# Patient Record
Sex: FEMALE | Race: WHITE | Employment: OTHER | URBAN - METROPOLITAN AREA
[De-identification: names, ages, dates, MRNs, and addresses within clinical notes are randomized per-mention and may not be internally consistent; named-entity substitution may affect disease eponyms.]

---

## 2017-06-27 RX ORDER — PAROXETINE 10 MG/1
TABLET, FILM COATED ORAL
Qty: 90 TAB | Refills: 3 | Status: SHIPPED | OUTPATIENT
Start: 2017-06-27 | End: 2017-08-28 | Stop reason: SDUPTHER

## 2017-07-17 RX ORDER — METOPROLOL SUCCINATE 25 MG/1
TABLET, EXTENDED RELEASE ORAL
Qty: 15 TAB | Refills: 0 | OUTPATIENT
Start: 2017-07-17

## 2017-07-17 NOTE — TELEPHONE ENCOUNTER
Patient said she is completely out of her medication and she needs this sent in ASAP she said she has been calling

## 2017-07-19 RX ORDER — METOPROLOL SUCCINATE 25 MG/1
TABLET, EXTENDED RELEASE ORAL
Qty: 15 TAB | Refills: 0 | Status: SHIPPED | OUTPATIENT
Start: 2017-07-19 | End: 2017-08-08 | Stop reason: SDUPTHER

## 2017-07-19 NOTE — TELEPHONE ENCOUNTER
Requested Prescriptions     Signed Prescriptions Disp Refills    metoprolol succinate (TOPROL-XL) 25 mg XL tablet 15 Tab 0     Sig: TAKE ONE-HALF TABLET BY MOUTH DAILY     Authorizing Provider: Yamini Trejo     Ordering User: Shannan Hooper     Per Orders

## 2017-08-08 ENCOUNTER — OFFICE VISIT (OUTPATIENT)
Dept: CARDIOLOGY CLINIC | Age: 72
End: 2017-08-08

## 2017-08-08 VITALS
BODY MASS INDEX: 30.94 KG/M2 | WEIGHT: 147.4 LBS | SYSTOLIC BLOOD PRESSURE: 134 MMHG | HEIGHT: 58 IN | HEART RATE: 64 BPM | DIASTOLIC BLOOD PRESSURE: 80 MMHG | RESPIRATION RATE: 16 BRPM

## 2017-08-08 DIAGNOSIS — E55.9 VITAMIN D DEFICIENCY: ICD-10-CM

## 2017-08-08 DIAGNOSIS — I10 ESSENTIAL HYPERTENSION: Primary | ICD-10-CM

## 2017-08-08 DIAGNOSIS — I49.8 VENTRICULAR TRIGEMINY: ICD-10-CM

## 2017-08-08 DIAGNOSIS — F41.9 ANXIETY: ICD-10-CM

## 2017-08-08 DIAGNOSIS — E11.9 DIABETES MELLITUS TYPE 2, DIET-CONTROLLED (HCC): ICD-10-CM

## 2017-08-08 DIAGNOSIS — I49.3 PVC (PREMATURE VENTRICULAR CONTRACTION): ICD-10-CM

## 2017-08-08 RX ORDER — METOPROLOL SUCCINATE 25 MG/1
25 TABLET, EXTENDED RELEASE ORAL DAILY
Qty: 90 TAB | Refills: 3 | Status: SHIPPED | OUTPATIENT
Start: 2017-08-08 | End: 2018-08-07 | Stop reason: SDUPTHER

## 2017-08-08 RX ORDER — CHOLECALCIFEROL (VITAMIN D3) 125 MCG
1 CAPSULE ORAL AS NEEDED
COMMUNITY
End: 2019-12-03

## 2017-08-08 NOTE — MR AVS SNAPSHOT
Visit Information Date & Time Provider Department Dept. Phone Encounter #  
 8/8/2017  9:40 AM Dinesh Liu MD [facility] [place] Patient [phone] [id number] Your Appointments 8/28/2017  9:00 AM  
Medicare Physical with Mauriico Dunn MD  
[facility] [address] [facility]) Appt Note: per pt/ medicare annual/jnm [address] [facility] [address] [facility] [address]  
[address] [facility] [address] Upcoming Health Maintenance Date Due HEMOGLOBIN A1C Q6M 12/15/2016 MICROALBUMIN Q1 6/15/2017 INFLUENZA AGE 9 TO ADULT 8/1/2017 FOOT EXAM Q1 8/24/2017 LIPID PANEL Q1 8/24/2017 GLAUCOMA SCREENING Q2Y 9/29/2017 EYE EXAM RETINAL OR DILATED Q1 11/27/2017* MEDICARE YEARLY EXAM 8/25/2017 BREAST CANCER SCRN MAMMOGRAM 8/22/2018 COLONOSCOPY 1/16/2020 DTaP/Tdap/Td series (2 - Td) 7/14/2024 *Topic was postponed. The date shown is not the original due date. Allergies as of 8/8/2017  Review Complete On: 8/8/2017 By: Krystal Luque Severity Noted Reaction Type Reactions Pcn [Penicillins]  09/22/2011    Rash, Swelling, Unknown (comments) Sulfa (Sulfonamide Antibiotics)  09/22/2011    Unknown (comments) Current Immunizations  Reviewed on 9/1/2015 Name Date Pneumococcal Conjugate (PCV-13) 8/12/2015 Pneumococcal Vaccine (Unspecified Type) 2/7/2011 Tdap 7/14/2014 Zoster Vaccine, Live 1/1/2012 Not reviewed this visit You Were Diagnosed With   
  
 Codes Comments Essential hypertension    -  Primary ICD-10-CM: I10 
ICD-9-CM: 401.9 Ventricular trigeminy     ICD-10-CM: I49.8 ICD-9-CM: 427.89 PVC (premature ventricular contraction)     ICD-10-CM: I49.3 ICD-9-CM: 427.69 Anxiety     ICD-10-CM: F41.9 ICD-9-CM: 300.00 Vitamin D deficiency     ICD-10-CM: E55.9 ICD-9-CM: 268.9 Diabetes mellitus type 2, diet-controlled ([facility]     ICD-10-CM: E11.9 ICD-9-CM: 250.00 Vitals BP Pulse Resp Height(growth percentile) Weight(growth percentile) BMI  
 134/80 (BP 1 Location: Left arm, BP Patient Position: Sitting) 64 16 4' 10\" (1.473 m) 147 lb 6.4 oz (66.9 kg) 30.81 kg/m2 OB Status Smoking Status Postmenopausal Never Smoker Vitals History BMI and BSA Data Body Mass Index Body Surface Area  
 30.81 kg/m 2 1.65 m 2 Preferred Pharmacy Pharmacy Name Phone [id number] [facility] [place] [facility] [phone] Your Updated Medication List  
  
   
This list is accurate as of: 8/8/17 10:07 AM.  Always use your most recent med list.  
  
  
  
  
 ALEVE 220 mg Cap Generic drug:  naproxen sodium Take 1 Cap by mouth as needed. amLODIPine 5 mg tablet Commonly known as:  Achilles Sherwood Take 1 Tab by mouth daily. aspirin 81 mg tablet Take 81 mg by mouth daily. CALCIUM PO Take 600 mg by mouth daily. cholecalciferol 400 unit Tab tablet Commonly known as:  VITAMIN D3 Take  by mouth daily. glucose blood VI test strips strip Commonly known as:  blood glucose test  
1 Each by Does Not Apply route daily. [employer] Up and Up brand -  Test blood sugar daily, ICD-10-CM: E11.9 Lancets Misc Check bs qd  
  
 metoprolol succinate 25 mg XL tablet Commonly known as:  TOPROL-XL Take 1 Tab by mouth daily. MULTIVITAMIN PO Take  by mouth as directed. PARoxetine 10 mg tablet Commonly known as:  PAXIL TAKE 1 TABLET EVERY DAY Prescriptions Sent to Pharmacy Refills  
 metoprolol succinate (TOPROL-XL) 25 mg XL tablet 3 Sig: Take 1 Tab by mouth daily. Class: Normal  
 Pharmacy: [id number] [place] [facility] [address] [place] [address] Ph #: [phone] Route: Oral  
  
We Performed the Following AMB POC EKG ROUTINE W/ 12 LEADS, INTER & REP [47328 CPT(R)] Introducing [facility]! [facility] introduces [employer] patient portal. Now you can access parts of your medical record, email your doctor's office, and request medication refills online. 1. In your internet browser, go to [email or web address] 2. Click on the First Time User? Click Here link in the Sign In box. You will see the New Member Sign Up page. 3. Enter your Sefaira Access Code exactly as it appears below. You will not need to use this code after youve completed the sign-up process. If you do not sign up before the expiration date, you must request a new code. · [employer] Access Code: [email or web address] Expires: 11/6/2017  8:19 AM 
 
4. Enter the last four digits of your Social Security Number (xxxx) and Date of Birth (mm/dd/yyyy) as indicated and click Submit. You will be taken to the next sign-up page. 5. Create a Sefaira ID. This will be your S[employer]a login ID and cannot be changed, so think of one that is secure and easy to remember. 6. Create a Sef[employer]a password. You can change your password at any time. 7. Enter your Password Reset Question and Answer. This can be used at a later time if you forget your password. 8. Enter your e-mail address. You will receive e-mail notification when new information is available in [address] 9. Click Sign Up. You can now view and download portions of your medical record. 10. Click the Download Summary menu link to download a portable copy of your medical information. If you have questions, please visit the Frequently Asked Questions section of the S[employer]a website. Remember, Sefaira is NOT to be used for urgent needs. For medical emergencies, dial 911. Now available from your iPhone and Android! Please provide this summary of care documentation to your next provider. Your primary care clinician is listed as Chani Grcaia.  If you have any questions after today's visit, please call [phone].

## 2017-08-08 NOTE — PROGRESS NOTES
[facility] [place]: [place]  [phone]    HPI: Andry Ma, a 66y.o. year-old who presents for evaluation of PVC, v-trigemeny. We agreed last time to wait and see if her symptom progressed and whether she would want to proceed with a cath to investigate her abnormal stress test.     She is having a lot of neck problems and bothers her. She has good days and bad days. Doing PT for her neck. Ca score was zero. Bp is good, but labile with her emotions and pain. Great today. She is still having that sinking feeling in the chest, at least once a week. Not passing out or near-syncope. She has some gas at times that causes trouble too. Will have a sharp pain with that just a second and gets better. She went on her cruise to [place] this summer for her 49th anniversary. With 15 of her family, had a great time. She thinks her anxiety level on the Paxil. She is not exercising and we discussed it again today. EKG NST no PVC today    Assessment/Plan:  1. Body mass index is 30.81 kg/(m^2). - Work on diet and exercise, discussed  2. PVC check K, Mag, echo  3. Dyspnea- no change, stable with exertion, climbing steps  4. HTN- at goal, cont amlodpine 5mg daily, metoprolol 25mg daily  5. Dyslipidemia- diet controlled for now, ca score 0!  6. Palpitations, PVC, PAC, as above   7. Chest tightness- at times, atypical, watch for now  8. Abnormal stress test-with ca score of zero, watching now    Stress Echo 9/15exe 7:00 anteroseptal and apical inferior hypokinesis, AXEL  Holter 9/15 PVC, PAC, atach 133  Soc no tob no etoh  Fhx cancer, CVA no early CAD    She  has a past medical history of Anxiety; Basal cell carcinoma of skin of face; Colon polyps; Diabetes mellitus type 2, diet-controlled ([facility] (9/1/2015); Family history of skin cancer; Hypercholesterolemia; Hypertension; IGT (impaired glucose tolerance); Osteopenia (july 2015); PVC (premature ventricular contraction) (February 2016);  Skin cancer (March 2013); Sun-damaged skin; and Sunburn, blistering. She also has no past medical history of Arsenic suspected exposure; Radiation exposure; or Tanning bed exposure. Cardiovascular ROS: negative for - chest pain  Respiratory ROS: negative for - cough or orthopnea  Neurological ROS: no TIA or stroke symptoms  All other systems negative except as above. PE  Vitals:    08/08/17 0934   BP: 134/80   Pulse: 64   Resp: 16   Weight: 147 lb 6.4 oz (66.9 kg)   Height: 4' 10\" (1.473 m)    Body mass index is 30.81 kg/(m^2).    General appearance - alert, well appearing, and in no distress  Mental status - affect appropriate to mood  Eyes - sclera anicteric, moist mucous membranes  Neck - supple, no significant adenopathy  Lymphatics - no  lymphadenopathy  Chest - clear to auscultation, no wheezes, rales or rhonchi  Heart - normal rate, regular rhythm, normal S1, S2, no murmurs, rubs, clicks or gallops  Abdomen - soft, nontender, nondistended, no masses or organomegaly  Back exam - full range of motion, no tenderness  Neurological - cranial nerves II through XII grossly intact, no focal deficit  Musculoskeletal - no muscular tenderness noted, normal strength  Extremities - peripheral pulses normal, no pedal edema  Skin - normal coloration  no rashes    Recent Labs:  Lab Results   Component Value Date/Time    Cholesterol, total 228 08/24/2016 09:35 AM    HDL Cholesterol 86 08/24/2016 09:35 AM    LDL, calculated 123 08/24/2016 09:35 AM    Triglyceride 93 08/24/2016 09:35 AM     Lab Results   Component Value Date/Time    Creatinine 0.74 08/24/2016 09:35 AM     Lab Results   Component Value Date/Time    BUN 17 08/24/2016 09:35 AM     Lab Results   Component Value Date/Time    Potassium 4.7 08/24/2016 09:35 AM     Lab Results   Component Value Date/Time    Hemoglobin A1c 6.5 11/11/2015 09:59 AM     Lab Results   Component Value Date/Time    HGB 14.6 08/24/2016 09:35 AM     Lab Results   Component Value Date/Time    PLATELET 330 08/24/2016 09:35 AM       Reviewed:  Past Medical History:   Diagnosis Date    Anxiety     began with menopause    Basal cell carcinoma of skin of face     Colon polyps     Dr Radha Syed, January 2015 normal colonoscopy, need  year follow up    Diabetes mellitus type 2, diet-controlled ([facility] 9/1/2015    Family history of skin cancer     sister- melanoma; son- several BCC    Hypercholesterolemia     Hypertension     IGT (impaired glucose tolerance)     pre diabetic    Osteopenia july 2015    PVC (premature ventricular contraction) February 2016    Dr Evie Dupree    Skin cancer March 2013    [facility], left cheek, Dr. Rios/Selina [id number] evaluation    Sun-damaged skin     As a child and teen pt had bad sunburns    Sunburn, blistering      History   Smoking Status    Never Smoker   Smokeless Tobacco    Never Used     History   Alcohol Use    0.0 - 0.6 oz/week    0 - 1 Standard drinks or equivalent per week     Comment: occasional     Allergies   Allergen Reactions    Pcn [Penicillins] Rash, Swelling and Unknown (comments)    Sulfa (Sulfonamide Antibiotics) Unknown (comments)       Current Outpatient Prescriptions   Medication Sig    naproxen sodium (ALEVE) 220 mg cap Take 1 Cap by mouth as needed.  metoprolol succinate (TOPROL-XL) 25 mg XL tablet TAKE ONE-HALF TABLET BY MOUTH DAILY    PARoxetine (PAXIL) 10 mg tablet TAKE 1 TABLET EVERY DAY    amLODIPine (NORVASC) 5 mg tablet Take 1 Tab by mouth daily. (Patient taking differently: Take 2.5 mg by mouth daily.)    glucose blood VI test strips (BLOOD GLUCOSE TEST) strip 1 Each by Does Not Apply route daily. [employer] Up [employer] Up brand -  Test blood sugar daily, ICD-10-CM: E11.9 (Patient taking differently: 1 Each by Does Not Apply route every other day. [employer] Up [employer] brand -  Test blood sugar daily, ICD-10-CM: E11.9)    Lancets misc Check bs qd    cholecalciferol (VITAMIN D3) 400 unit tab tablet Take  by mouth daily.     MULTIVITAMIN PO Take  by mouth as directed.  aspirin 81 mg tablet Take 81 mg by mouth daily.  CALCIUM PO Take 600 mg by mouth daily. No current facility-administered medications for this visit.         MD Valentín Cuevas [facility] [place]  [facility] [address] [facility] [address]  [address]

## 2017-08-28 ENCOUNTER — OFFICE VISIT (OUTPATIENT)
Dept: INTERNAL MEDICINE CLINIC | Age: 72
End: 2017-08-28

## 2017-08-28 ENCOUNTER — HOSPITAL ENCOUNTER (OUTPATIENT)
Dept: LAB | Age: 72
Discharge: HOME | End: 2017-08-28
Payer: MEDICARE

## 2017-08-28 VITALS
RESPIRATION RATE: 20 BRPM | TEMPERATURE: 98.1 F | HEART RATE: 62 BPM | HEIGHT: 58 IN | WEIGHT: 145.6 LBS | BODY MASS INDEX: 30.56 KG/M2 | SYSTOLIC BLOOD PRESSURE: 139 MMHG | DIASTOLIC BLOOD PRESSURE: 72 MMHG | OXYGEN SATURATION: 98 %

## 2017-08-28 DIAGNOSIS — I10 ESSENTIAL HYPERTENSION: ICD-10-CM

## 2017-08-28 DIAGNOSIS — Z12.31 ENCOUNTER FOR SCREENING MAMMOGRAM FOR MALIGNANT NEOPLASM OF BREAST: ICD-10-CM

## 2017-08-28 DIAGNOSIS — R19.7 DIARRHEA, UNSPECIFIED TYPE: ICD-10-CM

## 2017-08-28 DIAGNOSIS — Z12.39 BREAST CANCER SCREENING, HIGH RISK PATIENT: ICD-10-CM

## 2017-08-28 DIAGNOSIS — Z00.00 INITIAL MEDICARE ANNUAL WELLNESS VISIT: Primary | ICD-10-CM

## 2017-08-28 DIAGNOSIS — E11.9 DIABETES MELLITUS TYPE 2, DIET-CONTROLLED (HCC): ICD-10-CM

## 2017-08-28 DIAGNOSIS — F41.1 GAD (GENERALIZED ANXIETY DISORDER): ICD-10-CM

## 2017-08-28 PROCEDURE — 83036 HEMOGLOBIN GLYCOSYLATED A1C: CPT

## 2017-08-28 PROCEDURE — 80053 COMPREHEN METABOLIC PANEL: CPT

## 2017-08-28 PROCEDURE — 80061 LIPID PANEL: CPT

## 2017-08-28 PROCEDURE — 85027 COMPLETE CBC AUTOMATED: CPT

## 2017-08-28 PROCEDURE — 84443 ASSAY THYROID STIM HORMONE: CPT

## 2017-08-28 PROCEDURE — 82043 UR ALBUMIN QUANTITATIVE: CPT

## 2017-08-28 RX ORDER — INSULIN PUMP SYRINGE, 3 ML
EACH MISCELLANEOUS
Qty: 1 KIT | Refills: 0 | Status: SHIPPED | OUTPATIENT
Start: 2017-08-28 | End: 2021-06-07

## 2017-08-28 RX ORDER — AMLODIPINE BESYLATE 5 MG/1
5 TABLET ORAL DAILY
Qty: 90 TAB | Refills: 3 | Status: SHIPPED | OUTPATIENT
Start: 2017-08-28 | End: 2018-08-07 | Stop reason: SDUPTHER

## 2017-08-28 RX ORDER — PAROXETINE 10 MG/1
TABLET, FILM COATED ORAL
Qty: 90 TAB | Refills: 3 | Status: SHIPPED | OUTPATIENT
Start: 2017-08-28 | End: 2018-09-17 | Stop reason: SDUPTHER

## 2017-08-28 RX ORDER — LANCETS
EACH MISCELLANEOUS
Qty: 2 PACKAGE | Refills: 3 | Status: SHIPPED | OUTPATIENT
Start: 2017-08-28 | End: 2019-03-18 | Stop reason: SDUPTHER

## 2017-08-28 NOTE — ACP (ADVANCE CARE PLANNING)
Reviewed living will on file with patient. No medical decision maker specified. However patient agrees  first, then 3 adult children equally. Has a strong [employer]od[employer] tarun background, notes prayer to be important in her life.      Crystal Freeman MD

## 2017-08-28 NOTE — PROGRESS NOTES
RM #1    Chief Complaint   Patient presents with    Annual Wellness Visit    Medication Refill     Patient is fasting. 1. Have you been to the ER, urgent care clinic since your last visit? Hospitalized since your last visit? Yes When: Mello Posey 2-17 for neck pain    2. Have you seen or consulted any other health care providers outside of the [address] [place] [address] since your last visit? Include any pap smears or colon screening. Yes When: PT for 6 weeks at [facility] Dr. Brantley Mater   Abuse Screening Questionnaire 8/28/2017   Do you ever feel afraid of your partner? N   Are you in a relationship with someone who physically or mentally threatens you? N   Is it safe for you to go home?  Y     ADL Assessment 8/28/2017   Feeding yourself No Help Needed   Getting from bed to chair No Help Needed   Getting dressed No Help Needed   Bathing or showering No Help Needed   Walk across the room (includes cane/walker) No Help Needed   Using the telphone No Help Needed   Taking your medications No Help Needed   Preparing meals No Help Needed   Managing money (expenses/bills) No Help Needed   Moderately strenuous housework (laundry) No Help Needed   Shopping for personal items (toiletries/medicines) No Help Needed   Shopping for groceries No Help Needed   Driving No Help Needed   Climbing a flight of stairs No Help Needed   Getting to places beyond walking distances No Help Needed     PHQ over the last two weeks 8/28/2017   Little interest or pleasure in doing things Not at all   Feeling down, depressed or hopeless Not at all   Total Score PHQ 2 0

## 2017-08-28 NOTE — MR AVS SNAPSHOT
Visit Information Date & Time Provider Department Dept. Phone Encounter #  
 8/28/2017  9:00 AM Allyson Campa, [address] [place] [address] and Internal Medicine [phone] [id number] Follow-up Instructions Return in about 1 year (around 8/28/2018) for annual viist (schedule as 45 minute unless returning earlier). .  
  
Your Appointments 8/7/2018  9:20 AM  
ESTABLISHED PATIENT with Tereso Key MD  
[facility] ([facility]) Appt Note: 1 year follow up  
 [address] [facility] [address]  
[address] [address] Upcoming Health Maintenance Date Due HEMOGLOBIN A1C Q6M 12/15/2016 MICROALBUMIN Q1 6/15/2017 FOOT EXAM Q1 8/24/2017 LIPID PANEL Q1 8/24/2017 MEDICARE YEARLY EXAM 8/25/2017 GLAUCOMA SCREENING Q2Y 9/29/2017 EYE EXAM RETINAL OR DILATED Q1 11/27/2017* BREAST CANCER SCRN MAMMOGRAM 8/22/2018 COLONOSCOPY 1/16/2020 DTaP/Tdap/Td series (2 - Td) 7/14/2024 *Topic was postponed. The date shown is not the original due date. Allergies as of 8/28/2017  Review Complete On: 8/28/2017 By: Oralia Nagel Severity Noted Reaction Type Reactions Pcn [Penicillins]  09/22/2011    Rash, Swelling, Unknown (comments) Sulfa (Sulfonamide Antibiotics)  09/22/2011    Unknown (comments) Current Immunizations  Reviewed on 9/1/2015 Name Date Pneumococcal Conjugate (PCV-13) 8/12/2015 Pneumococcal Vaccine (Unspecified Type) 2/7/2011 Tdap 7/14/2014 Zoster Vaccine, Live 1/1/2012 Not reviewed this visit You Were Diagnosed With   
  
 Codes Comments Initial Medicare annual wellness visit    -  Primary ICD-10-CM: Z00.00 ICD-9-CM: V70.0 Essential hypertension     ICD-10-CM: I10 
ICD-9-CM: 401.9 RAMANDEEP (generalized anxiety disorder)     ICD-10-CM: F41.1 ICD-9-CM: 300.02   
 Diabetes mellitus type 2, diet-controlled ([facility]     ICD-10-CM: E11.9 ICD-9-CM: 250.00 Breast cancer screening, high risk patient     ICD-10-CM: Z12.39 
ICD-9-CM: V76.11 Diarrhea, unspecified type     ICD-10-CM: R19.7 ICD-9-CM: 787.91 Encounter for screening mammogram for malignant neoplasm of breast     ICD-10-CM: Z12.31 
ICD-9-CM: V76.12 Vitals BP Pulse Temp Resp Height(growth percentile) Weight(growth percentile) 139/72 (BP 1 Location: Left arm, BP Patient Position: Sitting) 62 98.1 °F (36.7 °C) (Oral) 20 4' 10\" (1.473 m) 145 lb 9.6 oz (66 kg) SpO2 BMI OB Status Smoking Status 98% 30.43 kg/m2 Postmenopausal Never Smoker Vitals History BMI and BSA Data Body Mass Index Body Surface Area  
 30.43 kg/m 2 1.64 m 2 Preferred Pharmacy Pharmacy Name Phone [id number] [address] [facility] [phone] Your Updated Medication List  
  
   
This list is accurate as of: 8/28/17 10:20 AM.  Always use your most recent med list.  
  
  
  
  
 ALEVE 220 mg Cap Generic drug:  naproxen sodium Take 1 Cap by mouth as needed. amLODIPine 5 mg tablet Commonly known as:  Job Dub Take 1 Tab by mouth daily. aspirin 81 mg tablet Take 81 mg by mouth daily. Blood-Glucose Meter monitoring kit To measure blood sugar and average 1 time per day. Pleas provide lowest cost brand. Diagnosis code E11.9 CALCIUM PO Take 600 mg by mouth daily. cholecalciferol 400 unit Tab tablet Commonly known as:  VITAMIN D3 Take  by mouth daily. * glucose blood VI test strips strip Commonly known as:  blood glucose test  
1 Each by Does Not Apply route daily. [employer] Up [employer] Up brand -  Test blood sugar daily, ICD-10-CM: E11.9  
  
 * glucose blood VI test strips strip Commonly known as:  ASCENSIA AUTODISC VI, ONE TOUCH ULTRA TEST VI  
 To measure blood sugar and average 1 time per day. Pleas provide lowest cost brand. Diagnosis code E11.9 Lancets Misc To measure blood sugar and average 1 time per day. Pleas provide lowest cost brand. Diagnosis code E11.9  
  
 metoprolol succinate 25 mg XL tablet Commonly known as:  TOPROL-XL Take 1 Tab by mouth daily. MULTIVITAMIN PO Take  by mouth as directed. PARoxetine 10 mg tablet Commonly known as:  PAXIL TAKE 1 TABLET EVERY DAY  
  
 * Notice: This list has 2 medication(s) that are the same as other medications prescribed for you. Read the directions carefully, and ask your doctor or other care provider to review them with you. Prescriptions Sent to Pharmacy Refills  
 amLODIPine (NORVASC) 5 mg tablet 3 Sig: Take 1 Tab by mouth daily. Class: Normal  
 [facility]: [id number] at [address] [place] [address] Ph #: [phone] Route: Oral  
 PARoxetine (PAXIL) 10 mg tablet 3 Sig: TAKE 1 TABLET EVERY DAY Class: Normal  
 Pharmacy: [id number] at [address] [place] [address] Ph #: [phone]  
 glucose blood VI test strips (BLOOD GLUCOSE TEST) strip 3 Si Each by Does Not Apply route daily. [employer] Up [employer] Up brand -  Test blood sugar daily, ICD-10-CM: E11.9 Class: Normal  
 Pharmacy: [id number] at [address] [place] [address] Ph #: [phone] Route: Does Not Apply Blood-Glucose Meter monitoring kit 0 Sig: To measure blood sugar and average 1 time per day. Pleas provide lowest cost brand. Diagnosis code E11.9 Class: Normal  
 Pharmacy: [id number] at [address] [place] [address] Ph #: [phone]  
 glucose blood VI test strips (ASCENSIA AUTODISC VI, ONE TOUCH ULTRA TEST VI) strip 4 Sig: To measure blood sugar and average 1 time per day. Pleas provide lowest cost brand. Diagnosis code E11.9 Class: Normal  
 Pharmacy: [id number] [place] at [address] [facility] Ph #: [phone] Lancets misc 3 Sig: To measure blood sugar and average 1 time per day. Pleas provide lowest cost brand. Diagnosis code E11.9 Class: Normal  
 Pharmacy: [id number] [place] at [address] [facility] Ph #: [phone] We Performed the Following CBC W/O DIFF [22772 CPT(R)] HEMOGLOBIN A1C WITH EAG [26019 CPT(R)] LIPID PANEL [05763 CPT(R)] METABOLIC PANEL, COMPREHENSIVE [83705 CPT(R)] MICROALBUMIN, UR, RAND W/ MICROALBUMIN/CREA RATIO [id number] CPT(R)] TSH RFX ON ABNORMAL TO FREE T4 [[id number] Custom] Follow-up Instructions Return in about 1 year (around 8/28/2018) for annual viist (schedule as 45 minute unless returning earlier). [place] To-Do List   
 Around 08/29/2017 Imaging:  [facility] MAMMO BI SCREENING INCL CAD Patient Instructions Consider metformin for diabetes prevention. Continue to work on weight loss, exercise, and avoiding sugary beverages and other carbohydrates. Calcium target daily is 1200 mg. No extra benefit to bones or health by supplementing over this. I do recommend a daily vit D supplement. Medicare Wellness Visit, Female The best way to live healthy is to have a healthy lifestyle by eating a well-balanced diet, exercising regularly, limiting alcohol and stopping smoking. Regular physical exams and screening tests are another way to keep healthy. Preventive exams provided by your health care provider can find health problems before they become diseases or illnesses. Preventive services including immunizations, screening tests, monitoring and exams can help you take care of your own health. All people over age 72 should have a pneumovax  and and a prevnar shot to prevent pneumonia. These are once in a lifetime unless you and your provider decide differently. All people over 65 should have a yearly flu shot and a tetanus vaccine every 10 years. A bone mass density to screen for osteoporosis or thinning of the bones should be done every 2 years after 65. Screening for diabetes mellitus with a blood sugar test should be done every year. Glaucoma is a disease of the eye due to increased ocular pressure that can lead to blindness and it should be done every year by an eye professional. 
 
Cardiovascular screening tests that check for elevated lipids (fatty part of blood) which can lead to heart disease and strokes should be done every 5 years. Colorectal screening that evaluates for blood or polyps in your colon should be done yearly as a stool test or every five years as a flexible sigmoidoscope or every 10 years as a colonoscopy up to age 76. Breast cancer screening with a mammogram is recommended biennially  for women age 54-69. Screening for cervical cancer with a pap smear and pelvic exam is recommended for women after age 72 years every 2 years up to age 79 or when the provider and patient decide to stop. If there is a history of cervical abnormalities or other increased risk for cancer then the test is recommended yearly. Hepatitis C screening is also recommended for anyone born between 80 through [address]. A shingles vaccine is also recommended once in a lifetime after age 61. Your Medicare Wellness Exam is recommended annually. Here is a list of your current Health Maintenance items with a due date: 
Health Maintenance Due Topic Date Due  
 Hemoglobin A1C    12/15/2016  Albumin Urine Test  06/15/2017 [facility] Diabetic Foot Care  08/24/2017  Cholesterol Test   08/24/2017 [facility] Annual Well Visit  08/25/2017  Glaucoma Screening   09/29/2017 All of the above were done today except: glaucoma screen and diabetic foot care. (foot exam) Please consider following up with gastroenterology for diarrhea if persisting. It may be recommended to repeat colonoscopy before landing of diagnosis of IBS. Kasie Umanzor Introducing [facility]! [employer] introduces [employer] patient portal. Now you can access parts of your medical record, email your doctor's office, and request medication refills online. 1. In your internet browser, go to [email or web address] 2. Click on the First Time User? Click Here link in the Sign In box. You will see the New Member Sign Up page. 3. Enter your [employer] Access Code exactly as it appears below. You will not need to use this code after youve completed the sign-up process. If you do not sign up before the expiration date, you must request a new code. · [employer] Access Code: [email or web address] Expires: 11/6/2017  8:19 AM 
 
4. Enter the last four digits of your Social Security Number (xxxx) and Date of Birth (mm/dd/yyyy) as indicated and click Submit. You will be taken to the next sign-up page. 5. Create a [employer] ID. This will be your [employer] login ID and cannot be changed, so think of one that is secure and easy to remember. 6. Create a [employer] password. You can change your password at any time. 7. Enter your Password Reset Question and Answer. This can be used at a later time if you forget your password. 8. Enter your e-mail address. You will receive e-mail notification when new information is available in [address] 9. Click Sign Up. You can now view and download portions of your medical record. 10. Click the Download Summary menu link to download a portable copy of your medical information. If you have questions, please visit the Frequently Asked Questions section of the [employer] website. Remember, [employer] is NOT to be used for urgent needs. For medical emergencies, dial 911. Now available from your iPhone and Android! Please provide this summary of care documentation to your next provider. Your primary care clinician is listed as Christopher Romano. If you have any questions after today's visit, please call [phone].

## 2017-08-28 NOTE — PATIENT INSTRUCTIONS
Consider metformin for diabetes prevention. Continue to work on weight loss, exercise, and avoiding sugary beverages and other carbohydrates. Calcium target daily is 1200 mg. No extra benefit to bones or health by supplementing over this. I do recommend a daily vit D supplement. Medicare Wellness Visit, Female    The best way to live healthy is to have a healthy lifestyle by eating a well-balanced diet, exercising regularly, limiting alcohol and stopping smoking. Regular physical exams and screening tests are another way to keep healthy. Preventive exams provided by your health care provider can find health problems before they become diseases or illnesses. Preventive services including immunizations, screening tests, monitoring and exams can help you take care of your own health. All people over age 72 should have a pneumovax  and and a prevnar shot to prevent pneumonia. These are once in a lifetime unless you and your provider decide differently. All people over 65 should have a yearly flu shot and a tetanus vaccine every 10 years. A bone mass density to screen for osteoporosis or thinning of the bones should be done every 2 years after 65. Screening for diabetes mellitus with a blood sugar test should be done every year. Glaucoma is a disease of the eye due to increased ocular pressure that can lead to blindness and it should be done every year by an eye professional.    Cardiovascular screening tests that check for elevated lipids (fatty part of blood) which can lead to heart disease and strokes should be done every 5 years. Colorectal screening that evaluates for blood or polyps in your colon should be done yearly as a stool test or every five years as a flexible sigmoidoscope or every 10 years as a colonoscopy up to age 76. Breast cancer screening with a mammogram is recommended biennially  for women age 54-69.     Screening for cervical cancer with a pap smear and pelvic exam is recommended for women after age 72 years every 2 years up to age 79 or when the provider and patient decide to stop. If there is a history of cervical abnormalities or other increased risk for cancer then the test is recommended yearly. Hepatitis C screening is also recommended for anyone born between 80 through [address]. A shingles vaccine is also recommended once in a lifetime after age 61. Your Medicare Wellness Exam is recommended annually. Here is a list of your current Health Maintenance items with a due date:  Health Maintenance Due   Topic Date Due    Hemoglobin A1C    12/15/2016    Albumin Urine Test  06/15/2017    Diabetic Foot Care  08/24/2017    Cholesterol Test   08/24/2017    Annual Well Visit  08/25/2017    Glaucoma Screening   09/29/2017     All of the above were done today except: glaucoma screen and diabetic foot care. (foot exam)    Please consider following up with gastroenterology for diarrhea if persisting. It may be recommended to repeat colonoscopy before landing of diagnosis of IBS. Parker Matute

## 2017-08-28 NOTE — PROGRESS NOTES
This is an Initial Medicare Annual Wellness Exam (AWV) (Performed 12 months after IPPE or effective date of Medicare Part B enrollment, Once in a lifetime)    I have reviewed the patient's medical history in detail and updated the computerized patient record. History     Presents for annual visit. Medicare wellness. Past Medical History:   Diagnosis Date    Anxiety     began with menopause    Basal cell carcinoma of skin of face     Colon polyps     Dr Jessi Frias, January 2015 normal colonoscopy, need  year follow up    Diabetes mellitus type 2, diet-controlled ([address] 9/1/2015    Family history of skin cancer     sister- melanoma; son- several BCC    Hypercholesterolemia     Hypertension     IGT (impaired glucose tolerance)     pre diabetic    Osteopenia july 2015    PVC (premature ventricular contraction) February 2016    Dr Andrea Mendoza    Skin cancer March 2013    [facility], left cheek, Dr. Rios/Selina [id number] evaluation    Sun-damaged skin     As a child and teen pt had bad sunburns    Sunburn, blistering       Past Surgical History:   Procedure Laterality Date    ENDOSCOPY, COLON, DIAGNOSTIC  12/09    5 years    HX CARPAL TUNNEL RELEASE      bilat    HX POLYPECTOMY  2000,2005    colon     Current Outpatient Prescriptions   Medication Sig Dispense Refill    naproxen sodium (ALEVE) 220 mg cap Take 1 Cap by mouth as needed.  metoprolol succinate (TOPROL-XL) 25 mg XL tablet Take 1 Tab by mouth daily. 90 Tab 3    PARoxetine (PAXIL) 10 mg tablet TAKE 1 TABLET EVERY DAY 90 Tab 3    amLODIPine (NORVASC) 5 mg tablet Take 1 Tab by mouth daily. 90 Tab 3    glucose blood VI test strips (BLOOD GLUCOSE TEST) strip 1 Each by Does Not Apply route daily. [employer] Up [employer] brand -  Test blood sugar daily, ICD-10-CM: E11.9 (Patient taking differently: 1 Each by Does Not Apply route every other day.  [employer] Up [employer] brand -  Test blood sugar daily, ICD-10-CM: E11.9) 100 Strip 3    Lancets misc Check bs qd 2 Package 3    cholecalciferol (VITAMIN D3) 400 unit tab tablet Take  by mouth daily.  MULTIVITAMIN PO Take  by mouth as directed.  CALCIUM PO Take 600 mg by mouth daily.  aspirin 81 mg tablet Take 81 mg by mouth daily. Allergies   Allergen Reactions    Pcn [Penicillins] Rash, Swelling and Unknown (comments)    Sulfa (Sulfonamide Antibiotics) Unknown (comments)     Family History   Problem Relation Age of Onset    Cancer Mother 39     uterine   Johanan Imam Stroke Mother     Dementia Mother     Other Father      Parkinsons    Crohn's Disease Daughter     Dementia Maternal Aunt      all 5 mat aunts    Colon Cancer Maternal Grandmother     Colon Cancer Paternal Grandfather      Social History   Substance Use Topics    Smoking status: Never Smoker    Smokeless tobacco: Never Used    Alcohol use 0.0 - 0.6 oz/week     0 - 1 Standard drinks or equivalent per week      Comment: occasional     Patient Active Problem List   Diagnosis Code    HTN (hypertension) I10    Anxiety F41.9    Vitamin D deficiency E55.9    Postmenopausal Z78.0    Ventricular trigeminy I49.8    Diabetes mellitus type 2, diet-controlled ([facility] E11.9    Colon polyps K63.5    Osteopenia M85.80    PVC (premature ventricular contraction) I49.3    Skin cancer C44.90       Depression Risk Factor Screening:     PHQ over the last two weeks 8/28/2017   Little interest or pleasure in doing things Not at all   Feeling down, depressed or hopeless Not at all   Total Score PHQ 2 0     Alcohol Risk Factor Screening: You do not drink alcohol or very rarely. Functional Ability and Level of Safety:     Hearing Loss  Hearing is good. Whisper Test done with abnormal results. Activities of Daily Living  The home contains: no safety equipment  Patient does total self care    Fall RiskFall Risk Assessment, last 12 mths 8/28/2017   Able to walk? Yes   Fall in past 12 months? Yes   Fall with injury?  No   Number of falls in past 12 months 1 Fall Risk Score 1    - fall in driveway. Tripped on slipper. ~1 month ago. No headaches. Abuse Screen  Patient is not abused   Lives with  Jeni Cortes who is 76. Doing well besides hearing. Has an adult daughter who is a nurse. Son who is a . Cognitive Screening   Evaluation of Cognitive Function:  Has your family/caregiver stated any concerns about your memory: no  Patient has some concern because mother had alzheimer's. Patient Care Team   Patient Care Team:  Tab Lockett NP as PCP - General (Family Practice)  Tammie Dominguez MD (Optometry)  Silvestre Vaughn MD (Dermatology)  Bernard Martinez MD (Cardiology)    Advice/Referrals/Counseling   Education and counseling provided:  Are appropriate based on today's review and evaluation  End-of-Life planning (with patient's consent)    Assessment/Plan       ICD-10-CM ICD-9-CM    1. Initial Medicare annual wellness visit Z00.00 V70.0    2. Essential hypertension I10 401.9 amLODIPine (NORVASC) 5 mg tablet      TSH RFX ON ABNORMAL TO FREE T4   3. RAMANDEEP (generalized anxiety disorder) F41.1 300.02 PARoxetine (PAXIL) 10 mg tablet      TSH RFX ON ABNORMAL TO FREE T4   4. Diabetes mellitus type 2, diet-controlled (HCC) E11.9 250.00 HEMOGLOBIN A1C WITH EAG      LIPID PANEL      METABOLIC PANEL, COMPREHENSIVE      CBC W/O DIFF      MICROALBUMIN, UR, RAND W/ MICROALBUMIN/CREA RATIO      glucose blood VI test strips (BLOOD GLUCOSE TEST) strip      Blood-Glucose Meter monitoring kit      glucose blood VI test strips (ASCENSIA AUTODISC VI, ONE TOUCH ULTRA TEST VI) strip      Lancets misc      TSH RFX ON ABNORMAL TO FREE T4   5. Breast cancer screening, high risk patient Z12.39 V76.11 [facility] MAMMO BI SCREENING INCL CAD   6. Diarrhea, unspecified type R19.7 787.91 TSH RFX ON ABNORMAL TO FREE T4   7.  Encounter for screening mammogram for malignant neoplasm of breast  Z12.31 V76.12 [facility] MAMMO BI SCREENING INCL CAD     Well woman (non-gyn) exam: history and exam revealed issues as noted below. Cancer screening:    - colon cancer screen due next 2020   - mammogram ordered  Vaccine status: vaccines up to date. Cardiovascular risk: BP well controlled, lipid screen today. Not on statin, takes daily MVI. Bone health: daily vit D supplement daily. Diet and Exercise: not drinking sugary beverages. HTN: needs medication refill for metoprolol and amlodipine. BP well controlled at this time. RAMANDEEP: Paroxetine: has been taking since menopause for anxiety and hot flashes. Likes  the feeling of being evened. Currently denies problems with appetite. + sleep concerns. Diabetes: fasting will do lab work today. Bowel abnormalities: has not     History of neck arthritis: takes naproxen as needed. Also has some hand arthritis. Health Maintenance Due   Topic Date Due    HEMOGLOBIN A1C Q6M  12/15/2016    MICROALBUMIN Q1  06/15/2017    INFLUENZA AGE 9 TO ADULT  08/01/2017    FOOT EXAM Q1  08/24/2017    LIPID PANEL Q1  08/24/2017    MEDICARE YEARLY EXAM  08/25/2017    GLAUCOMA SCREENING Q2Y  09/29/2017       This is the additional documentation to reflect E/M visit for follow-up of the medical conditions listed in HPI and attached to EM visit:     HPI    PVC/HTN: Notes she recently saw her cardiologist for ongoing. Diabetes, diet controlled vs prediabetes: continues to follow low suar diet. Dr. Benito Echavarria [employer] has retired. Last vision screen was in November. Skin cancer: Following with Dr. Pauline Gould for skin cancer screen. History of BCC. No new findings this year    Bowel incontinence: Concern for gradual change in bowel habits. Lost control a few times. Has had variable consistency of bowel from diarrhea. No bladder control issues. Normal colonoscopy in 2015. Accidents occurred since then. No blood. PMH/PSH: reviewed and updated  Sochx:  reports that she has never smoked. She has never used smokeless tobacco. She reports that she drinks alcohol.  She reports that she does not use illicit drugs. Famhx: reviewed and updated  All: reviewed and updated. Med: reviewed and updated. Review of Systems   Constitutional: Negative for chills, fever and malaise/fatigue. Respiratory: Negative for shortness of breath. Cardiovascular: Negative for chest pain. PE:  Blood pressure 139/72, pulse 62, temperature 98.1 °F (36.7 °C), temperature source Oral, resp. rate 20, height 4' 10\" (1.473 m), weight 145 lb 9.6 oz (66 kg), SpO2 98 %. Body mass index is 30.43 kg/(m^2). Physical Exam   Constitutional: She is oriented to person, place, and time. She appears well-developed and well-nourished. No distress. HENT:   Head: Normocephalic. Mouth/Throat: Oropharynx is clear and moist.   Eyes: Conjunctivae and EOM are normal.   Neck: Neck supple. No thyromegaly present. Cardiovascular: Normal rate, regular rhythm and normal heart sounds. Pulmonary/Chest: Effort normal and breath sounds normal.   Abdominal: Soft. She exhibits no distension. Musculoskeletal: She exhibits no edema. Lymphadenopathy:     She has no cervical adenopathy. Neurological: She is alert and oriented to person, place, and time. Skin: Skin is warm and dry. Psychiatric: She has a normal mood and affect. Nursing note and vitals reviewed. Labs:   See addendum    A/P:  As noted above in preventative care/MWV visit. - She was given AVS and expressed understanding with the diagnosis and plan as discussed. Follow-up Disposition:  Return in about 1 year (around 8/28/2018) for annual viist (schedule as 45 minute unless returning earlier).  Jose Blevins MD

## 2017-08-29 DIAGNOSIS — E11.9 DIABETES MELLITUS TYPE 2, DIET-CONTROLLED (HCC): ICD-10-CM

## 2017-08-29 LAB
ALBUMIN SERPL-MCNC: 4.2 G/DL (ref 3.5–4.8)
ALBUMIN/CREAT UR: 3.2 MG/G CREAT (ref 0–30)
ALBUMIN/GLOB SERPL: 1.7 {RATIO} (ref 1.2–2.2)
ALP SERPL-CCNC: 77 IU/L (ref 39–117)
ALT SERPL-CCNC: 11 IU/L (ref 0–32)
AST SERPL-CCNC: 13 IU/L (ref 0–40)
BILIRUB SERPL-MCNC: 0.4 MG/DL (ref 0–1.2)
BUN SERPL-MCNC: 19 MG/DL (ref 8–27)
BUN/CREAT SERPL: 28 (ref 12–28)
CALCIUM SERPL-MCNC: 9.3 MG/DL (ref 8.7–10.3)
CHLORIDE SERPL-SCNC: 100 MMOL/L (ref 96–106)
CHOLEST SERPL-MCNC: 219 MG/DL (ref 100–199)
CO2 SERPL-SCNC: 27 MMOL/L (ref 18–29)
CREAT SERPL-MCNC: 0.67 MG/DL (ref 0.57–1)
CREAT UR-MCNC: 109.3 MG/DL
ERYTHROCYTE [DISTWIDTH] IN BLOOD BY AUTOMATED COUNT: 13.7 % (ref 12.3–15.4)
EST. AVERAGE GLUCOSE BLD GHB EST-MCNC: 131 MG/DL
GLOBULIN SER CALC-MCNC: 2.5 G/DL (ref 1.5–4.5)
GLUCOSE SERPL-MCNC: 119 MG/DL (ref 65–99)
HBA1C MFR BLD: 6.2 % (ref 4.8–5.6)
HCT VFR BLD AUTO: 43.7 % (ref 34–46.6)
HDLC SERPL-MCNC: 80 MG/DL
HGB BLD-MCNC: 14.5 G/DL (ref 11.1–15.9)
LDLC SERPL CALC-MCNC: 118 MG/DL (ref 0–99)
MCH RBC QN AUTO: 30.8 PG (ref 26.6–33)
MCHC RBC AUTO-ENTMCNC: 33.2 G/DL (ref 31.5–35.7)
MCV RBC AUTO: 93 FL (ref 79–97)
MICROALBUMIN UR-MCNC: 3.5 UG/ML
PLATELET # BLD AUTO: 334 X10E3/UL (ref 150–379)
POTASSIUM SERPL-SCNC: 4.7 MMOL/L (ref 3.5–5.2)
PROT SERPL-MCNC: 6.7 G/DL (ref 6–8.5)
RBC # BLD AUTO: 4.71 X10E6/UL (ref 3.77–5.28)
SODIUM SERPL-SCNC: 141 MMOL/L (ref 134–144)
TRIGL SERPL-MCNC: 106 MG/DL (ref 0–149)
TSH SERPL DL<=0.005 MIU/L-ACNC: 1.98 UIU/ML (ref 0.45–4.5)
VLDLC SERPL CALC-MCNC: 21 MG/DL (ref 5–40)
WBC # BLD AUTO: 7.1 X10E3/UL (ref 3.4–10.8)

## 2017-09-05 ENCOUNTER — HOSPITAL ENCOUNTER (OUTPATIENT)
Dept: MAMMOGRAPHY | Age: 72
Discharge: HOME | End: 2017-09-05
Attending: INTERNAL MEDICINE
Payer: MEDICARE

## 2017-09-05 DIAGNOSIS — Z12.39 BREAST CANCER SCREENING, HIGH RISK PATIENT: ICD-10-CM

## 2017-09-05 DIAGNOSIS — Z12.31 ENCOUNTER FOR SCREENING MAMMOGRAM FOR MALIGNANT NEOPLASM OF BREAST: ICD-10-CM

## 2017-09-05 PROCEDURE — 77067 SCR MAMMO BI INCL CAD: CPT

## 2018-08-07 ENCOUNTER — OFFICE VISIT (OUTPATIENT)
Dept: CARDIOLOGY CLINIC | Age: 73
End: 2018-08-07

## 2018-08-07 ENCOUNTER — HOSPITAL ENCOUNTER (OUTPATIENT)
Dept: LAB | Age: 73
Discharge: HOME | End: 2018-08-07
Payer: MEDICARE

## 2018-08-07 VITALS
RESPIRATION RATE: 16 BRPM | SYSTOLIC BLOOD PRESSURE: 124 MMHG | BODY MASS INDEX: 31.02 KG/M2 | WEIGHT: 147.8 LBS | HEART RATE: 120 BPM | DIASTOLIC BLOOD PRESSURE: 82 MMHG | HEIGHT: 58 IN

## 2018-08-07 DIAGNOSIS — R00.2 PALPITATIONS: ICD-10-CM

## 2018-08-07 DIAGNOSIS — I49.3 PVC (PREMATURE VENTRICULAR CONTRACTION): ICD-10-CM

## 2018-08-07 DIAGNOSIS — T14.8XXA BRUISING: ICD-10-CM

## 2018-08-07 DIAGNOSIS — R07.89 CHEST TIGHTNESS: ICD-10-CM

## 2018-08-07 DIAGNOSIS — I47.1 SVT (SUPRAVENTRICULAR TACHYCARDIA) (HCC): Primary | ICD-10-CM

## 2018-08-07 DIAGNOSIS — R94.39 ABNORMAL STRESS ECHO: ICD-10-CM

## 2018-08-07 DIAGNOSIS — R06.02 SHORTNESS OF BREATH: ICD-10-CM

## 2018-08-07 DIAGNOSIS — I10 ESSENTIAL HYPERTENSION: ICD-10-CM

## 2018-08-07 PROCEDURE — 84443 ASSAY THYROID STIM HORMONE: CPT

## 2018-08-07 PROCEDURE — 84436 ASSAY OF TOTAL THYROXINE: CPT

## 2018-08-07 PROCEDURE — 36415 COLL VENOUS BLD VENIPUNCTURE: CPT

## 2018-08-07 PROCEDURE — 80053 COMPREHEN METABOLIC PANEL: CPT

## 2018-08-07 PROCEDURE — 83735 ASSAY OF MAGNESIUM: CPT

## 2018-08-07 PROCEDURE — 85027 COMPLETE CBC AUTOMATED: CPT

## 2018-08-07 RX ORDER — AMLODIPINE BESYLATE 2.5 MG/1
2.5 TABLET ORAL DAILY
Qty: 90 TAB | Refills: 3 | Status: SHIPPED | OUTPATIENT
Start: 2018-08-07 | End: 2019-07-23 | Stop reason: SDUPTHER

## 2018-08-07 RX ORDER — METOPROLOL SUCCINATE 25 MG/1
25 TABLET, EXTENDED RELEASE ORAL 2 TIMES DAILY
Qty: 180 TAB | Refills: 3 | Status: SHIPPED | OUTPATIENT
Start: 2018-08-07 | End: 2018-12-20

## 2018-08-07 NOTE — MR AVS SNAPSHOT
[address] [facility] [address] [place] 
[phone] Patient: Herman Menon MRN: [id number] :1945 Visit Information Date & Time Provider Department Dept. Phone Encounter #  
 2018  9:20 AM Ryland Gore MD [facility] Kaylen Barros [phone] [id number] Your Appointments 2018 12:00 PM  
NUCLEAR MEDICINE with STELLA REN [facility] ([place] SCHEDULING) Appt Note: 1 day Lexiscan for sob ht 4'10 wt 147 echo for palps, sob 2:00 per Dr. Malena Grande [address] [place]  
[phone]  
  
   
 [address] [facility] [address]  
  
    
 2018  2:00 PM  
ECHO CARDIOGRAMS 2D with Yony Simms [facility] ([place] SCHEDULING) Appt Note: 1 day Lexiscan for sob ht 4'10 wt 147 echo for palps, sob 2:00 per Dr. Malena Grande [address] [facility] [place]  
[address] [facility] [address]  
  
    
 2018  3:00 PM  
Medicare Physical with Elaine Govea MD  
[facility]) Appt Note: Medicare Wellness [id number] [place] [address] [address] [address]  
  
    
 12/10/2018  2:40 PM  
ESTABLISHED PATIENT with Ryland Gore MD  
[facility] ([address]) Appt Note: 3-4 mo f/u per Dr. Malena Grande [address] [place]  
[address] [facility] [id number] Upcoming Health Maintenance Date Due  
 EYE EXAM RETINAL OR DILATED Q1 2016 FOOT EXAM Q1 2017 GLAUCOMA SCREENING Q2Y 2017 HEMOGLOBIN A1C Q6M 2018 Influenza Age 5 to Adult 2018 MICROALBUMIN Q1 8/28/2018 LIPID PANEL Q1 8/28/2018 MEDICARE YEARLY EXAM 8/29/2018 BREAST CANCER SCRN MAMMOGRAM 9/5/2019 COLONOSCOPY 1/16/2020 DTaP/Tdap/Td series (2 - Td) 7/14/2024 Allergies as of 8/7/2018  Review Complete On: 8/7/2018 By: Crispin Butterfield Severity Noted Reaction Type Reactions Pcn [Penicillins]  09/22/2011    Rash, Swelling, Unknown (comments) Sulfa (Sulfonamide Antibiotics)  09/22/2011    Unknown (comments) Current Immunizations  Reviewed on 9/1/2015 Name Date Pneumococcal Conjugate (PCV-13) 8/12/2015 Pneumococcal Vaccine (Unspecified Type) 2/7/2011 Tdap 7/14/2014 Zoster Vaccine, Live 1/1/2012 Not reviewed this visit You Were Diagnosed With   
  
 Codes Comments SVT (supraventricular tachycardia) ([facility]    -  Primary ICD-10-CM: I47.1 ICD-9-CM: 427.89 PVC (premature ventricular contraction)     ICD-10-CM: I49.3 ICD-9-CM: 427.69 Essential hypertension     ICD-10-CM: I10 
ICD-9-CM: 401.9 Shortness of breath     ICD-10-CM: R06.02 
ICD-9-CM: 786.05 Chest tightness     ICD-10-CM: R07.89 ICD-9-CM: 786.59 Palpitations     ICD-10-CM: R00.2 ICD-9-CM: 785.1 Abnormal stress echo     ICD-10-CM: R94.39 
ICD-9-CM: 794.39 Bruising     ICD-10-CM: T14. Gwen Gerardo ICD-9-CM: 924.9 Vitals BP Pulse Resp Height(growth percentile) Weight(growth percentile) BMI  
 124/82 (BP 1 Location: Right arm, BP Patient Position: Sitting) (!) 120 16 4' 10\" (1.473 m) 147 lb 12.8 oz (67 kg) 30.89 kg/m2 OB Status Smoking Status Postmenopausal Never Smoker Vitals History BMI and BSA Data Body Mass Index Body Surface Area  
 30.89 kg/m 2 1.66 m 2 Preferred Pharmacy Pharmacy Name Phone [id number] [facility] [phone] Your Updated Medication List  
  
   
This list is accurate as of 8/7/18 10:24 AM.  Always use your most recent med list.  
  
  
  
  
 ALEVE 220 mg Cap Generic drug:  naproxen sodium Take 1 Cap by mouth as needed. amLODIPine 2.5 mg tablet Commonly known as:  Lennis Eagles Take 1 Tab by mouth daily. aspirin 81 mg tablet Take 81 mg by mouth every other day. Blood-Glucose Meter monitoring kit To measure blood sugar and average 1 time per day. Pleas provide lowest cost brand. Diagnosis code E11.9 CALCIUM PO Take 600 mg by mouth daily. cholecalciferol 400 unit Tab tablet Commonly known as:  VITAMIN D3 Take  by mouth daily. * glucose blood VI test strips strip Commonly known as:  ASCENSIA AUTODISC VI, ONE TOUCH ULTRA TEST VI To measure blood sugar and average 1 time per day. Pleas provide lowest cost brand. Diagnosis code E11.9  
  
 * glucose blood VI test strips strip Commonly known as:  blood glucose test  
1 Each by Does Not Apply route daily. Test blood sugar daily, ICD-10-CM: E11.9 Lancets Misc To measure blood sugar and average 1 time per day. Pleas provide lowest cost brand. Diagnosis code E11.9  
  
 metoprolol succinate 25 mg XL tablet Commonly known as:  TOPROL-XL Take 1 Tab by mouth two (2) times a day. MULTIVITAMIN PO Take  by mouth as directed. PARoxetine 10 mg tablet Commonly known as:  PAXIL TAKE 1 TABLET EVERY DAY  
  
 PROBIOTIC PO Take 1 Cap by mouth daily. psyllium Powd Commonly known as:  METAMUCIL Take  by mouth every other day. * Notice: This list has 2 medication(s) that are the same as other medications prescribed for you. Read the directions carefully, and ask your doctor or other care provider to review them with you. Prescriptions Sent to Pharmacy Refills  
 amLODIPine (NORVASC) 2.5 mg tablet 3 Sig: Take 1 Tab by mouth daily. Class: Normal  
 Pharmacy: [id number] at [address] [place] [address] #: [phone]  Route: Oral  
 metoprolol succinate (TOPROL-XL) 25 mg XL tablet 3 Sig: Take 1 Tab by mouth two (2) times a day. Class: Normal  
 Pharmacy: [id number] at [address] [place] [address] #: [phone] Route: Oral  
  
We Performed the Following 2D ECHO COMPLETE ADULT (TTE) W OR WO CONTR [26188 CPT(R)] AMB POC EKG ROUTINE W/ 12 LEADS, INTER & REP [97315 CPT(R)] CBC W/O DIFF [12572 CPT(R)] MAGNESIUM [id number] CPT(R)] METABOLIC PANEL, COMPREHENSIVE [29038 CPT(R)] T4 (THYROXINE) [id number] CPT(R)] TSH 3RD GENERATION [12444 CPT(R)] To-Do List   
 08/07/2018 Cardiac Services:  LOOP MONITOR   
  
 08/07/2018 ECG:  STRESS TEST LEXISCAN/CARDIOLITE Patient Instructions Please have labs drawn at [facility] today Please decrease your amlodipine to 2.5mg daily Please increase your Toprol XL to 25mg twice daily Please start exercising to help with your anxiety/stress - please try to walk at least 20 minutes/day to start Introducing [facility]! Priya Abel introduces [employer] patient portal. Now you can access parts of your medical record, email your doctor's office, and request medication refills online. 1. In your internet browser, go to [email or web address] 2. Click on the First Time User? Click Here link in the Sign In box. You will see the New Member Sign Up page. 3. Enter your [employer] Access Code exactly as it appears below. You will not need to use this code after youve completed the sign-up process. If you do not sign up before the expiration date, you must request a new code. · [employer] Access Code: [email or web address] Expires: 11/5/2018  7:20 AM 
 
4. Enter the last four digits of your Social Security Number (xxxx) and Date of Birth (mm/dd/yyyy) as indicated and click Submit. You will be taken to the next sign-up page. 5. Create a [employer] ID.  This will be your MyChart login ID and cannot be changed, so think of one that is secure and easy to remember. 6. Create a [employer] password. You can change your password at any time. 7. Enter your Password Reset Question and Answer. This can be used at a later time if you forget your password. 8. Enter your e-mail address. You will receive e-mail notification when new information is available in [address] 9. Click Sign Up. You can now view and download portions of your medical record. 10. Click the Download Summary menu link to download a portable copy of your medical information. If you have questions, please visit the Frequently Asked Questions section of the [employer] website. Remember, [employer] is NOT to be used for urgent needs. For medical emergencies, dial 911. Now available from your iPhone and Android! Please provide this summary of care documentation to your next provider. Your primary care clinician is listed as Arsenio Richards. If you have any questions after today's visit, please call [phone].

## 2018-08-07 NOTE — PATIENT INSTRUCTIONS
Please have labs drawn at [facility] today  Please decrease your amlodipine to 2.5mg daily  Please increase your Toprol XL to 25mg twice daily   Please start exercising to help with your anxiety/stress - please try to walk at least 20 minutes/day to start

## 2018-08-07 NOTE — PROGRESS NOTES
[facility] [place]: Rhonda Nails  [phone]    HPI: Molly Mcmanus, a 67y.o. year-old who presents for follow up regarding her PVCs, atrial tachycardiac. Today her ECG shows atrial tachycardia at 120bpm  She reports having a lot of anxiety over her GI issues, has bowel incontinence, had to run home before appointment today to use the bathroom  She feels anxious today but does not feel like her heart is racing  She does have episodes of heart racing at home, occurs about once/week, episodes last 15 minutes, sometimes they are shorter in duration, sometimes they are associated with \"sinking feeling\"  She notices them more at night but has them during the day  She does not have any dizziness or syncope  Have workup with GI - getting colonoscopy soon  She continues to have some squeezing in her chest, mostly at night when lying down, improves with position changes   Does not have chest discomfort with exertion  Has stable dyspnea with exertion, thinks it related to anxiety sometimes  Denies PND or orthopnea, adds a second pillow for neck pain sometimes   No LE edema   Bruises easily even taking ASA every other day   Her Paxil dose was decreased because she felt \"emotionless\"  She has not exercised x 1 year due to orthopedic issues    Assessment/Plan:  1. Body mass index is 30.89 kg/(m^2). needs to work on diet and exercise  2. PVCs, PACs, atrial tachycardia - today ECG shows SVT at 120bpm, will check CMP, magnesium level, TSH and T4, will order 2 week loop monitor to assess severity of tachycardia and look for arrhythmias, will order TTE to assess cardiac structure and function   -will increase Toprol XL 25mg BID  -follow up scheduled for 3-4 months   3. Dyspnea with exertion - will assess cardiac structure and function with TTE, will assess for ischemia with nuclear stress test   4. HTN- will reduce amlodipine 2.5mg daily with increase in Toprol XL dose as above   5.  Dyslipidemia- , diet controlled for now, ca score of zero in 2016  6. Chest tightness/squeezing - atypical, not exertional, had abnormal stress test in 2015 but ca score of zero, will proceed with nuclear stress test to assess for ischemia   7. Anxiety - on lower dose of paxil now    8. IGT - A1c 6.2%, needs to work on diet and exercise     Coronary calcium scan 2016 - zero   Stress Echo 9/15 exe 7:00 anteroseptal and apical inferior hypokinesis,   Holter 9/15 PVC, PAC, atach 133    Soc no tob no etoh  Fhx cancer, CVA no early CAD    She  has a past medical history of Anxiety; Basal cell carcinoma of skin of face; Colon polyps; Diabetes mellitus type 2, diet-controlled ([facility] [address] (9/1/2015); Family history of skin cancer; Hypercholesterolemia; Hypertension; IGT (impaired glucose tolerance); Osteopenia (july 2015); PVC (premature ventricular contraction) (February 2016); Skin cancer (March 2013); Sun-damaged skin; and Sunburn, blistering. She also has no past medical history of Arsenic suspected exposure; Radiation exposure; or Tanning bed exposure. Cardiovascular ROS: positive for palpitations, dyspnea with exertion, chest tightness   Respiratory ROS: negative for - cough or orthopnea  Neurological ROS: no TIA or stroke symptoms  All other systems negative except as above. PE  Vitals:    08/07/18 0920   BP: 124/82   Pulse: (!) 120   Resp: 16   Weight: 147 lb 12.8 oz (67 kg)   Height: 4' 10\" (1.473 m)    Body mass index is 30.89 kg/(m^2).    General appearance - alert, well appearing, and in no distress  Mental status - affect appropriate to mood  Eyes - sclera anicteric, moist mucous membranes  Neck - supple, no carotid bruits   Lymphatics - not assessed   Chest - clear to auscultation, no wheezes, rales or rhonchi  Heart - tachycardic, regular rhythm, normal S1, S2, no murmurs, rubs, clicks or gallops  Abdomen - soft, nontender, nondistended, no masses or organomegaly  Back exam - full range of motion, no tenderness  Neurological - cranial nerves II through XII grossly intact, no focal deficit  Musculoskeletal - no muscular tenderness noted, normal strength  Extremities - peripheral pulses normal, no pedal edema  Skin - normal coloration  no rashes    12 lead ECG: SVT VR 120bpm     Recent Labs:  Lab Results   Component Value Date/Time    Cholesterol, total 219 (H) 08/28/2017 10:20 AM    HDL Cholesterol 80 08/28/2017 10:20 AM    LDL, calculated 118 (H) 08/28/2017 10:20 AM    Triglyceride 106 08/28/2017 10:20 AM     Lab Results   Component Value Date/Time    Creatinine 0.67 08/28/2017 10:20 AM     Lab Results   Component Value Date/Time    BUN 19 08/28/2017 10:20 AM     Lab Results   Component Value Date/Time    Potassium 4.7 08/28/2017 10:20 AM     Lab Results   Component Value Date/Time    Hemoglobin A1c 6.2 (H) 08/28/2017 10:20 AM     Lab Results   Component Value Date/Time    HGB 14.5 08/28/2017 10:20 AM     Lab Results   Component Value Date/Time    PLATELET 421 79/01/8566 10:20 AM       Reviewed:  Past Medical History:   Diagnosis Date    Anxiety     began with menopause    Basal cell carcinoma of skin of face     Colon polyps     Dr Spike Mojica, January 2015 normal colonoscopy, need  year follow up    Diabetes mellitus type 2, diet-controlled ([facility] [address] 9/1/2015    Family history of skin cancer     sister- melanoma; son- several BCC    Hypercholesterolemia     Hypertension     IGT (impaired glucose tolerance)     pre diabetic    Osteopenia july 2015    PVC (premature ventricular contraction) February 2016    Dr Rodriguez Alonzo    Skin cancer March 2013    BCC, left cheek, Dr. Rios/Selina [id number] evaluation    Sun-damaged skin     As a child and teen pt had bad sunburns    Sunburn, blistering      History   Smoking Status    Never Smoker   Smokeless Tobacco    Never Used     History   Alcohol Use    0.0 - 0.6 oz/week    0 - 1 Standard drinks or equivalent per week     Comment: occasional     Allergies   Allergen Reactions    Pcn [Penicillins] Rash, Swelling and Unknown (comments)    Sulfa (Sulfonamide Antibiotics) Unknown (comments)       Current Outpatient Prescriptions   Medication Sig    Lactobacillus acidophilus (PROBIOTIC PO) Take 1 Cap by mouth daily.  psyllium (METAMUCIL) powd Take  by mouth every other day.  amLODIPine (NORVASC) 2.5 mg tablet Take 1 Tab by mouth daily.  metoprolol succinate (TOPROL-XL) 25 mg XL tablet Take 1 Tab by mouth two (2) times a day.  glucose blood VI test strips (BLOOD GLUCOSE TEST) strip 1 Each by Does Not Apply route daily. Test blood sugar daily, ICD-10-CM: E11.9    PARoxetine (PAXIL) 10 mg tablet TAKE 1 TABLET EVERY DAY    Blood-Glucose Meter monitoring kit To measure blood sugar and average 1 time per day. Pleas provide lowest cost brand. Diagnosis code E11.9    glucose blood VI test strips (ASCENSIA AUTODISC VI, ONE TOUCH ULTRA TEST VI) strip To measure blood sugar and average 1 time per day. Pleas provide lowest cost brand. Diagnosis code E11.9    Lancets misc To measure blood sugar and average 1 time per day. Pleas provide lowest cost brand. Diagnosis code E11.9    naproxen sodium (ALEVE) 220 mg cap Take 1 Cap by mouth as needed.  cholecalciferol (VITAMIN D3) 400 unit tab tablet Take  by mouth daily.  MULTIVITAMIN PO Take  by mouth as directed.  aspirin 81 mg tablet Take 81 mg by mouth every other day.  CALCIUM PO Take 600 mg by mouth daily. No current facility-administered medications for this visit.         Jeffrey Driver MD  [facility] [place]  [address] [facility] [address]  [facility], [address]

## 2018-08-08 ENCOUNTER — CLINICAL SUPPORT (OUTPATIENT)
Dept: CARDIOLOGY CLINIC | Age: 73
End: 2018-08-08

## 2018-08-08 ENCOUNTER — TELEPHONE (OUTPATIENT)
Dept: CARDIOLOGY CLINIC | Age: 73
End: 2018-08-08

## 2018-08-08 DIAGNOSIS — R00.2 PALPITATIONS: ICD-10-CM

## 2018-08-08 DIAGNOSIS — I47.1 SVT (SUPRAVENTRICULAR TACHYCARDIA) (HCC): ICD-10-CM

## 2018-08-08 LAB
ALBUMIN SERPL-MCNC: 4.3 G/DL (ref 3.5–4.8)
ALBUMIN/GLOB SERPL: 1.7 {RATIO} (ref 1.2–2.2)
ALP SERPL-CCNC: 90 IU/L (ref 39–117)
ALT SERPL-CCNC: 9 IU/L (ref 0–32)
AST SERPL-CCNC: 16 IU/L (ref 0–40)
BILIRUB SERPL-MCNC: 0.2 MG/DL (ref 0–1.2)
BUN SERPL-MCNC: 22 MG/DL (ref 8–27)
BUN/CREAT SERPL: 28 (ref 12–28)
CALCIUM SERPL-MCNC: 9.7 MG/DL (ref 8.7–10.3)
CHLORIDE SERPL-SCNC: 102 MMOL/L (ref 96–106)
CO2 SERPL-SCNC: 24 MMOL/L (ref 20–29)
CREAT SERPL-MCNC: 0.78 MG/DL (ref 0.57–1)
ERYTHROCYTE [DISTWIDTH] IN BLOOD BY AUTOMATED COUNT: 13.9 % (ref 12.3–15.4)
GLOBULIN SER CALC-MCNC: 2.6 G/DL (ref 1.5–4.5)
GLUCOSE SERPL-MCNC: 139 MG/DL (ref 65–99)
HCT VFR BLD AUTO: 45.4 % (ref 34–46.6)
HGB BLD-MCNC: 15.2 G/DL (ref 11.1–15.9)
MAGNESIUM SERPL-MCNC: 2.1 MG/DL (ref 1.6–2.3)
MCH RBC QN AUTO: 30.7 PG (ref 26.6–33)
MCHC RBC AUTO-ENTMCNC: 33.5 G/DL (ref 31.5–35.7)
MCV RBC AUTO: 92 FL (ref 79–97)
PLATELET # BLD AUTO: 326 X10E3/UL (ref 150–379)
POTASSIUM SERPL-SCNC: 5 MMOL/L (ref 3.5–5.2)
PROT SERPL-MCNC: 6.9 G/DL (ref 6–8.5)
RBC # BLD AUTO: 4.95 X10E6/UL (ref 3.77–5.28)
SODIUM SERPL-SCNC: 141 MMOL/L (ref 134–144)
T4 SERPL-MCNC: 6.8 UG/DL (ref 4.5–12)
TSH SERPL DL<=0.005 MIU/L-ACNC: 4.73 UIU/ML (ref 0.45–4.5)
WBC # BLD AUTO: 7.5 X10E3/UL (ref 3.4–10.8)

## 2018-08-08 NOTE — TELEPHONE ENCOUNTER
----- Message from Rylee Sloan NP sent at 8/8/2018  3:55 PM EDT -----  Labs ok        Lab results given to patient.  2 pt identifiers used

## 2018-08-23 ENCOUNTER — CLINICAL SUPPORT (OUTPATIENT)
Dept: CARDIOLOGY CLINIC | Age: 73
End: 2018-08-23

## 2018-08-23 ENCOUNTER — TELEPHONE (OUTPATIENT)
Dept: CARDIOLOGY CLINIC | Age: 73
End: 2018-08-23

## 2018-08-23 DIAGNOSIS — R06.02 SOB (SHORTNESS OF BREATH): ICD-10-CM

## 2018-08-23 DIAGNOSIS — I10 ESSENTIAL HYPERTENSION: Primary | ICD-10-CM

## 2018-08-23 DIAGNOSIS — R00.2 PALPITATIONS: ICD-10-CM

## 2018-08-23 DIAGNOSIS — I49.3 PVC (PREMATURE VENTRICULAR CONTRACTION): ICD-10-CM

## 2018-08-23 DIAGNOSIS — R06.02 SHORTNESS OF BREATH: ICD-10-CM

## 2018-08-23 DIAGNOSIS — R07.9 CHEST PAIN, UNSPECIFIED TYPE: ICD-10-CM

## 2018-08-23 DIAGNOSIS — R07.89 CHEST TIGHTNESS: ICD-10-CM

## 2018-08-23 DIAGNOSIS — R94.39 ABNORMAL STRESS ECHO: ICD-10-CM

## 2018-08-23 NOTE — PROGRESS NOTES
See scanned document. Ordering Doctor:Dr. Leigh Cordoba  Reading Doctor:Dr. Ana Carter    Patient tolerated procedure well.

## 2018-08-23 NOTE — TELEPHONE ENCOUNTER
----- Message from Grisel Luna MD sent at 8/23/2018  6:01 PM EDT -----  Test is normal  Nurse to call pt for test result      Nuclear stress test:    Impression:   1. Normal Lexiscan Gated SPECT myocardial perfusion study. 2. Overall left ventricular systolic function is normal. LVEF 64 %    These test results indicate low likelihood for the presence of angiographically significant CAD.           Interpreting Physician:   Grisel Luna MD   8/23/2018  Electronically signed     LVM for patient to return call at earliest convenience.

## 2018-09-04 ENCOUNTER — TELEPHONE (OUTPATIENT)
Dept: CARDIOLOGY CLINIC | Age: 73
End: 2018-09-04

## 2018-09-06 NOTE — TELEPHONE ENCOUNTER
Returned patients call, 2 pt identifiers used      Advised patient her echocardiogram testing was WNL.

## 2018-09-17 ENCOUNTER — OFFICE VISIT (OUTPATIENT)
Dept: INTERNAL MEDICINE CLINIC | Age: 73
End: 2018-09-17

## 2018-09-17 VITALS
WEIGHT: 147 LBS | HEART RATE: 53 BPM | SYSTOLIC BLOOD PRESSURE: 141 MMHG | HEIGHT: 58 IN | DIASTOLIC BLOOD PRESSURE: 63 MMHG | OXYGEN SATURATION: 99 % | BODY MASS INDEX: 30.86 KG/M2 | RESPIRATION RATE: 16 BRPM | TEMPERATURE: 98 F

## 2018-09-17 DIAGNOSIS — I10 ESSENTIAL HYPERTENSION: ICD-10-CM

## 2018-09-17 DIAGNOSIS — M85.80 OSTEOPENIA, UNSPECIFIED LOCATION: ICD-10-CM

## 2018-09-17 DIAGNOSIS — R15.2 INCONTINENCE OF FECES WITH FECAL URGENCY: ICD-10-CM

## 2018-09-17 DIAGNOSIS — E11.9 DIABETES MELLITUS TYPE 2, DIET-CONTROLLED (HCC): ICD-10-CM

## 2018-09-17 DIAGNOSIS — R79.89 ELEVATED TSH: ICD-10-CM

## 2018-09-17 DIAGNOSIS — R15.9 INCONTINENCE OF FECES WITH FECAL URGENCY: ICD-10-CM

## 2018-09-17 DIAGNOSIS — E55.9 VITAMIN D DEFICIENCY: ICD-10-CM

## 2018-09-17 DIAGNOSIS — F41.1 GAD (GENERALIZED ANXIETY DISORDER): ICD-10-CM

## 2018-09-17 DIAGNOSIS — Z00.00 MEDICARE ANNUAL WELLNESS VISIT, SUBSEQUENT: Primary | ICD-10-CM

## 2018-09-17 DIAGNOSIS — I49.8 VENTRICULAR TRIGEMINY: ICD-10-CM

## 2018-09-17 LAB
ALBUMIN UR QL STRIP: 10 MG/L
CREATININE, URINE POC: 100 MG/DL
HBA1C MFR BLD HPLC: 6.5 %
MICROALBUMIN/CREAT RATIO POC: <30 MG/G

## 2018-09-17 RX ORDER — PAROXETINE 10 MG/1
TABLET, FILM COATED ORAL
Qty: 90 TAB | Refills: 3 | Status: SHIPPED | OUTPATIENT
Start: 2018-09-17 | End: 2018-11-03 | Stop reason: SDUPTHER

## 2018-09-17 NOTE — PROGRESS NOTES
Exam room 2 Carlos Soliz is a 67 y.o. female Visit Vitals  /63 (BP 1 Location: Left arm, BP Patient Position: Sitting)  Pulse (!) 53  Temp 98 °F (36.7 °C) (Oral)  Resp 16  
 Ht 4' 10\" (1.473 m)  Wt 147 lb (66.7 kg)  SpO2 99%  BMI 30.72 kg/m2 Chief Complaint Patient presents with  Annual Wellness Visit  
pt states she is only taking 25mf metoprolo one not twice, and that she is wanting to know about her cardiac monitor results, states she was wearing monitor 1. Have you been to the ER, urgent care clinic since your last visit? Hospitalized since your last visit? No 
 
2. Have you seen or consulted any other health care providers outside of the [address] [place] [address] since your last visit? Include any pap smears or colon screening. No 
 
Health Maintenance Due Topic Date Due  
 EYE EXAM RETINAL OR DILATED Q1  09/29/2016  
 FOOT EXAM Q1  08/24/2017  GLAUCOMA SCREENING Q2Y  09/29/2017  
 HEMOGLOBIN A1C Q6M  02/28/2018  Influenza Age 5 to Adult  08/01/2018  MICROALBUMIN Q1  08/28/2018  LIPID PANEL Q1  08/28/2018  MEDICARE YEARLY EXAM  08/29/2018

## 2018-09-17 NOTE — PATIENT INSTRUCTIONS
Medicare Wellness Visit, Female The best way to live healthy is to have a lifestyle where you eat a well-balanced diet, exercise regularly, limit alcohol use, and quit all forms of tobacco/nicotine, if applicable. Regular preventive services are another way to keep healthy. Preventive services (vaccines, screening tests, monitoring & exams) can help personalize your care plan, which helps you manage your own care. Screening tests can find health problems at the earliest stages, when they are easiest to treat. [facility] follows the current, evidence-based guidelines published by the [facility] Jourdan Jad ([facility]) when recommending preventive services for our patients. Because we follow these guidelines, sometimes recommendations change over time as research supports it. (For example, mammograms used to be recommended annually. Even though Medicare will still pay for an annual mammogram, the newer guidelines recommend a mammogram every two years for women of average risk.) Of course, you and your doctor may decide to screen more often for some diseases, based on your risk and your health status. Preventive services for you include: - Medicare offers their members a free annual wellness visit, which is time for you and your primary care provider to discuss and plan for your preventive service needs. Take advantage of this benefit every year! 
-All adults over the age of 72 should receive the recommended pneumonia vaccines. Current USPSTF guidelines recommend a series of two vaccines for the best pneumonia protection.  
-All adults should have a flu vaccine yearly and a tetanus vaccine every 10 years. All adults age 61 and older should receive a shingles vaccine once in their lifetime.   
-A bone mass density test is recommended when a woman turns 65 to screen for osteoporosis. This test is only recommended one time, as a screening. Some providers will use this same test as a disease monitoring tool if you already have osteoporosis. -All adults age 38-68 who are overweight should have a diabetes screening test once every three years.  
-Other screening tests and preventive services for persons with diabetes include: an eye exam to screen for diabetic retinopathy, a kidney function test, a foot exam, and stricter control over your cholesterol.  
-Cardiovascular screening for adults with routine risk involves an electrocardiogram (ECG) at intervals determined by your doctor.  
-Colorectal cancer screenings should be done for adults age 54-65 with no increased risk factors for colorectal cancer. There are a number of acceptable methods of screening for this type of cancer. Each test has its own benefits and drawbacks. Discuss with your doctor what is most appropriate for you during your annual wellness visit. The different tests include: colonoscopy (considered the best screening method), a fecal occult blood test, a fecal DNA test, and sigmoidoscopy. -Breast cancer screenings are recommended every other year for women of normal risk, age 54-69. 
-Cervical cancer screenings for women over age 72 are only recommended with certain risk factors.  
-All adults born between [facility] should be screened once for Hepatitis C. Here is a list of your current Health Maintenance items (your personalized list of preventive services) with a due date: 
Health Maintenance Due Topic Date Due [facility] Eye Exam  09/29/2016 [facility] Diabetic Foot Care  08/24/2017  Glaucoma Screening   09/29/2017  Hemoglobin A1C    02/28/2018  Flu Vaccine  08/01/2018  Albumin Urine Test  08/28/2018  Cholesterol Test   08/28/2018 [facility] Annual Well Visit  08/29/2018 Diabetes Foot Health: Care Instructions Your Care Instructions When you have diabetes, your feet need extra care and attention.  Diabetes can damage the nerve endings and blood vessels in your feet, making you less likely to notice when your feet are injured. Diabetes also limits your body's ability to fight infection and get blood to areas that need it. If you get a minor foot injury, it could become an ulcer or a serious infection. With good foot care, you can prevent most of these problems. Caring for your feet can be quick and easy. Most of the care can be done when you are bathing or getting ready for bed. Follow-up care is a key part of your treatment and safety. Be sure to make and go to all appointments, and call your doctor if you are having problems. It's also a good idea to know your test results and keep a list of the medicines you take. How can you care for yourself at home? · Keep your blood sugar close to normal by watching what and how much you eat, monitoring blood sugar, taking medicines if prescribed, and getting regular exercise. · Do not smoke. Smoking affects blood flow and can make foot problems worse. If you need help quitting, talk to your doctor about stop-smoking programs and medicines. These can increase your chances of quitting for good. · Eat a diet that is low in fats. High fat intake can cause fat to build up in your blood vessels and decrease blood flow. · Inspect your feet daily for blisters, cuts, cracks, or sores. If you cannot see well, use a mirror or have someone help you. · Take care of your feet: 
[facility] your feet every day. Use warm (not hot) water. Check the water temperature with your wrists or other part of your body, not your feet. ¨ Dry your feet well. Pat them dry. Do not rub the skin on your feet too hard. Dry well between your toes. If the skin on your feet stays moist, bacteria or a fungus can grow, which can lead to infection. ¨ Keep your skin soft. Use moisturizing skin cream to keep the skin on your feet soft and prevent calluses and cracks.  But do not put the cream between your toes, and stop using any cream that causes a rash. ¨ Clean underneath your toenails carefully. Do not use a sharp object to clean underneath your toenails. Use the blunt end of a nail file or other rounded tool. ¨ Trim and file your toenails straight across to prevent ingrown toenails. Use a nail clipper, not scissors. Use an emery board to smooth the edges. · Change socks daily. Socks without seams are best, because seams often rub the feet. You can find socks for people with diabetes from specialty catalogs. · Look inside your shoes every day for things like gravel or torn linings, which could cause blisters or sores. · Buy shoes that fit well: 
¨ Look for shoes that have plenty of space around the toes. This helps prevent bunions and blisters. ¨ Try on shoes while wearing the kind of socks you will usually wear with the shoes. ¨ Avoid plastic shoes. They may rub your feet and cause blisters. Good shoes should be made of materials that are flexible and breathable, such as leather or cloth. ¨ Break in new shoes slowly by wearing them for no more than an hour a day for several days. Take extra time to check your feet for red areas, blisters, or other problems after you wear new shoes. · Do not go barefoot. Do not wear sandals, and do not wear shoes with very thin soles. Thin soles are easy to puncture. They also do not protect your feet from hot pavement or cold weather. · Have your doctor check your feet during each visit. If you have a foot problem, see your doctor. Do not try to treat an early foot problem at home. Home remedies or treatments that you can buy without a prescription (such as corn removers) can be harmful. · Always get early treatment for foot problems. A minor irritation can lead to a major problem if not properly cared for early. When should you call for help? Call your doctor now or seek immediate medical care if:   · You have a foot sore, an ulcer or break in the skin that is not healing after 4 days, bleeding corns or calluses, or an ingrown toenail.  
  · You have blue or black areas, which can mean bruising or blood flow problems.  
  · You have peeling skin or tiny blisters between your toes or cracking or oozing of the skin.  
  · You have a fever for more than 24 hours and a foot sore.  
  · You have new numbness or tingling in your feet that does not go away after you move your feet or change positions.  
  · You have unexplained or unusual swelling of the foot or ankle.  
 Watch closely for changes in your health, and be sure to contact your doctor if: 
  · You cannot do proper foot care. Where can you learn more? Go to http://thee-luis angel.info/. Enter A739 in the search box to learn more about \"Diabetes Foot Health: Care Instructions. \" Current as of: December 7, 2017 Content Version: 11.7 © 7878-6388 [employer]. Care instructions adapted under license by [employer] (which disclaims liability or warranty for this information). If you have questions about a medical condition or this instruction, always ask your healthcare professional. [address] any warranty or liability for your use of this information.

## 2018-09-17 NOTE — MR AVS SNAPSHOT
[address] [facility] [address] [facility] [address] 
[phone] Patient: Silvana Bah MRN: [id number] :1945 Visit Information Date & Time Provider Department Dept. Phone Encounter #  
 2018  3:00 PM Bhavya Cartagena MD [id number] [facility] [phone] Follow-up Instructions Return in about 6 months (around 3/17/2019) for follow-up diabetes. .  
  
Your Appointments 12/10/2018  2:40 PM  
ESTABLISHED PATIENT with Albreto Medrano MD  
[facility] ([address] [facility]) Appt Note: 3-4 mo f/u per Dr. Maurilio Hernandes [address] [place] [address]  
[phone] Upcoming Health Maintenance Date Due  
 EYE EXAM RETINAL OR DILATED Q1 2016 FOOT EXAM Q1 2017 GLAUCOMA SCREENING Q2Y 2017 HEMOGLOBIN A1C Q6M 2018 Influenza Age 5 to Adult 2018 MICROALBUMIN Q1 2018 LIPID PANEL Q1 2018 MEDICARE YEARLY EXAM 2018 BREAST CANCER SCRN MAMMOGRAM 2019 COLONOSCOPY 2020 DTaP/Tdap/Td series (2 - Td) 2024 Allergies as of 2018  Review Complete On: 2018 By: Bhavya Cartagena MD  
  
 Severity Noted Reaction Type Reactions Pcn [Penicillins]  2011    Rash, Swelling, Unknown (comments) Sulfa (Sulfonamide Antibiotics)  2011    Unknown (comments) Current Immunizations  Reviewed on 2015 Name Date Influenza Vaccine (Tri) Adjuvanted  Incomplete Pneumococcal Conjugate (PCV-13) 2015 Pneumococcal Vaccine (Unspecified Type) 2011 Tdap 2014 Zoster Vaccine, Live 2012 Not reviewed this visit You Were Diagnosed With   
  
 Codes Comments Medicare annual wellness visit, subsequent    -  Primary ICD-10-CM: Z00.00 ICD-9-CM: V70.0 Encounter for immunization     ICD-10-CM: C82 ICD-9-CM: V03.89 Diabetes mellitus type 2, diet-controlled ([facility]     ICD-10-CM: E11.9 ICD-9-CM: 250.00 Essential hypertension     ICD-10-CM: I10 
ICD-9-CM: 401.9 RAMANDEEP (generalized anxiety disorder)     ICD-10-CM: F41.1 ICD-9-CM: 300.02 Vitals BP Pulse Temp Resp Height(growth percentile) Weight(growth percentile) 141/63 (BP 1 Location: Left arm, BP Patient Position: Sitting) (!) 53 98 °F (36.7 °C) (Oral) 16 4' 10\" (1.473 m) 147 lb (66.7 kg) SpO2 BMI OB Status Smoking Status 99% 30.72 kg/m2 Postmenopausal Never Smoker BMI and BSA Data Body Mass Index Body Surface Area 30.72 kg/m 2 1.65 m 2 Preferred Pharmacy Pharmacy Name Phone [address] [phone] Your Updated Medication List  
  
   
This list is accurate as of 9/17/18  4:47 PM.  Always use your most recent med list.  
  
  
  
  
 ALEVE 220 mg Cap Generic drug:  naproxen sodium Take 1 Cap by mouth as needed. amLODIPine 2.5 mg tablet Commonly known as:  Lennis Eagles Take 1 Tab by mouth daily. aspirin 81 mg tablet Take 81 mg by mouth every other day. Blood-Glucose Meter monitoring kit To measure blood sugar and average 1 time per day. Pleas provide lowest cost brand. Diagnosis code E11.9 CALCIUM PO Take 600 mg by mouth daily. cholecalciferol 400 unit Tab tablet Commonly known as:  VITAMIN D3 Take  by mouth daily. FLUAD 1260-9191 (65 YR UP)(PF) Syrg injection Generic drug:  influenza vaccine 2018-19 (65 yrs+)(PF)  
INJECT 0.5ML INTRAMUSCULARLY ONE TIME ONLY  
  
 * glucose blood VI test strips strip Commonly known as:  ASCENSIA AUTODISC VI, ONE TOUCH ULTRA TEST VI To measure blood sugar and average 1 time per day. Pleas provide lowest cost brand. Diagnosis code E11.9  
  
 * glucose blood VI test strips strip Commonly known as:  blood glucose test  
1 Each by Does Not Apply route daily. Test blood sugar daily, ICD-10-CM: E11.9 Lancets Misc To measure blood sugar and average 1 time per day. Pleas provide lowest cost brand. Diagnosis code E11.9  
  
 metoprolol succinate 25 mg XL tablet Commonly known as:  TOPROL-XL Take 1 Tab by mouth two (2) times a day. MULTIVITAMIN PO Take  by mouth as directed. PARoxetine 10 mg tablet Commonly known as:  PAXIL TAKE 1 TABLET EVERY DAY  
  
 PROBIOTIC PO Take 1 Cap by mouth daily. psyllium Powd Commonly known as:  METAMUCIL Take  by mouth every other day. * Notice: This list has 2 medication(s) that are the same as other medications prescribed for you. Read the directions carefully, and ask your doctor or other care provider to review them with you. Prescriptions Sent to Pharmacy Refills  
 glucose blood VI test strips (BLOOD GLUCOSE TEST) strip 3 Si Each by Does Not Apply route daily. Test blood sugar daily, ICD-10-CM: E11.9 Class: Normal  
 Pharmacy: [id number] [address] [place] [address] Ph #: [phone] Route: Does Not Apply PARoxetine (PAXIL) 10 mg tablet 3 Sig: TAKE 1 TABLET EVERY DAY Class: Normal  
 Pharmacy: [id number] [facility], [address] [place] [address] Ph #: [phone] We Performed the Following ADMIN INFLUENZA VIRUS VAC [ [facility]] AMB POC HEMOGLOBIN A1C [99484 CPT(R)] AMB POC URINE, MICROALBUMIN, SEMIQUANT (3 RESULTS) [11337 CPT(R)]  DIABETES FOOT EXAM [[facility]] INFLUENZA VACCINE INACTIVATED (IIV), SUBUNIT, ADJUVANTED, IM [id number] CPT(R)] Follow-up Instructions Return in about 6 months (around 3/17/2019) for follow-up diabetes. .  
  
  
Patient Instructions Medicare Wellness Visit, Female The best way to live healthy is to have a lifestyle where you eat a well-balanced diet, exercise regularly, limit alcohol use, and quit all forms of tobacco/nicotine, if applicable. Regular preventive services are another way to keep healthy. Preventive services (vaccines, screening tests, monitoring & exams) can help personalize your care plan, which helps you manage your own care. Screening tests can find health problems at the earliest stages, when they are easiest to treat. [facility] follows the current, evidence-based guidelines published by the [facility] States Jourdan Jad ([facility]) when recommending preventive services for our patients. Because we follow these guidelines, sometimes recommendations change over time as research supports it. (For example, mammograms used to be recommended annually. Even though Medicare will still pay for an annual mammogram, the newer guidelines recommend a mammogram every two years for women of average risk.) Of course, you and your doctor may decide to screen more often for some diseases, based on your risk and your health status. Preventive services for you include: - Medicare offers their members a free annual wellness visit, which is time for you and your primary care provider to discuss and plan for your preventive service needs. Take advantage of this benefit every year! 
-All adults over the age of 72 should receive the recommended pneumonia vaccines. Current USPSTF guidelines recommend a series of two vaccines for the best pneumonia protection.  
-All adults should have a flu vaccine yearly and a tetanus vaccine every 10 years. All adults age 61 and older should receive a shingles vaccine once in their lifetime.   
-A bone mass density test is recommended when a woman turns 65 to screen for osteoporosis. This test is only recommended one time, as a screening. Some providers will use this same test as a disease monitoring tool if you already have osteoporosis. -All adults age 38-68 who are overweight should have a diabetes screening test once every three years.  
-Other screening tests and preventive services for persons with diabetes include: an eye exam to screen for diabetic retinopathy, a kidney function test, a foot exam, and stricter control over your cholesterol.  
-Cardiovascular screening for adults with routine risk involves an electrocardiogram (ECG) at intervals determined by your doctor.  
-Colorectal cancer screenings should be done for adults age 54-65 with no increased risk factors for colorectal cancer. There are a number of acceptable methods of screening for this type of cancer. Each test has its own benefits and drawbacks. Discuss with your doctor what is most appropriate for you during your annual wellness visit. The different tests include: colonoscopy (considered the best screening method), a fecal occult blood test, a fecal DNA test, and sigmoidoscopy. -Breast cancer screenings are recommended every other year for women of normal risk, age 54-69. 
-Cervical cancer screenings for women over age 72 are only recommended with certain risk factors.  
-All adults born between [facility] should be screened once for Hepatitis C. Here is a list of your current Health Maintenance items (your personalized list of preventive services) with a due date: 
Health Maintenance Due Topic Date Due [facility] Eye Exam  09/29/2016 [facility] Diabetic Foot Care  08/24/2017  Glaucoma Screening   09/29/2017  Hemoglobin A1C    02/28/2018  Flu Vaccine  08/01/2018  Albumin Urine Test  08/28/2018  Cholesterol Test   08/28/2018 [facility] Annual Well Visit  08/29/2018 Diabetes Foot Health: Care Instructions Your Care Instructions When you have diabetes, your feet need extra care and attention. Diabetes can damage the nerve endings and blood vessels in your feet, making you less likely to notice when your feet are injured.  Diabetes also limits your body's ability to fight infection and get blood to areas that need it. If you get a minor foot injury, it could become an ulcer or a serious infection. With good foot care, you can prevent most of these problems. Caring for your feet can be quick and easy. Most of the care can be done when you are bathing or getting ready for bed. Follow-up care is a key part of your treatment and safety. Be sure to make and go to all appointments, and call your doctor if you are having problems. It's also a good idea to know your test results and keep a list of the medicines you take. How can you care for yourself at home? · Keep your blood sugar close to normal by watching what and how much you eat, monitoring blood sugar, taking medicines if prescribed, and getting regular exercise. · Do not smoke. Smoking affects blood flow and can make foot problems worse. If you need help quitting, talk to your doctor about stop-smoking programs and medicines. These can increase your chances of quitting for good. · Eat a diet that is low in fats. High fat intake can cause fat to build up in your blood vessels and decrease blood flow. · Inspect your feet daily for blisters, cuts, cracks, or sores. If you cannot see well, use a mirror or have someone help you. · Take care of your feet: 
[facility] your feet every day. Use warm (not hot) water. Check the water temperature with your wrists or other part of your body, not your feet. ¨ Dry your feet well. Pat them dry. Do not rub the skin on your feet too hard. Dry well between your toes. If the skin on your feet stays moist, bacteria or a fungus can grow, which can lead to infection. ¨ Keep your skin soft. Use moisturizing skin cream to keep the skin on your feet soft and prevent calluses and cracks. But do not put the cream between your toes, and stop using any cream that causes a rash. ¨ Clean underneath your toenails carefully.  Do not use a sharp object to clean underneath your toenails. Use the blunt end of a nail file or other rounded tool. ¨ Trim and file your toenails straight across to prevent ingrown toenails. Use a nail clipper, not scissors. Use an emery board to smooth the edges. · Change socks daily. Socks without seams are best, because seams often rub the feet. You can find socks for people with diabetes from specialty catalogs. · Look inside your shoes every day for things like gravel or torn linings, which could cause blisters or sores. · Buy shoes that fit well: 
¨ Look for shoes that have plenty of space around the toes. This helps prevent bunions and blisters. ¨ Try on shoes while wearing the kind of socks you will usually wear with the shoes. ¨ Avoid plastic shoes. They may rub your feet and cause blisters. Good shoes should be made of materials that are flexible and breathable, such as leather or cloth. ¨ Break in new shoes slowly by wearing them for no more than an hour a day for several days. Take extra time to check your feet for red areas, blisters, or other problems after you wear new shoes. · Do not go barefoot. Do not wear sandals, and do not wear shoes with very thin soles. Thin soles are easy to puncture. They also do not protect your feet from hot pavement or cold weather. · Have your doctor check your feet during each visit. If you have a foot problem, see your doctor. Do not try to treat an early foot problem at home. Home remedies or treatments that you can buy without a prescription (such as corn removers) can be harmful. · Always get early treatment for foot problems. A minor irritation can lead to a major problem if not properly cared for early. When should you call for help? Call your doctor now or seek immediate medical care if: 
  · You have a foot sore, an ulcer or break in the skin that is not healing after 4 days, bleeding corns or calluses, or an ingrown toenail.   · You have blue or black areas, which can mean bruising or blood flow problems.  
  · You have peeling skin or tiny blisters between your toes or cracking or oozing of the skin.  
  · You have a fever for more than 24 hours and a foot sore.  
  · You have new numbness or tingling in your feet that does not go away after you move your feet or change positions.  
  · You have unexplained or unusual swelling of the foot or ankle.  
 Watch closely for changes in your health, and be sure to contact your doctor if: 
  · You cannot do proper foot care. Where can you learn more? Go to http://thee-luis angel.info/. Enter A739 in the search box to learn more about \"Diabetes Foot Health: Care Instructions. \" Current as of: December 7, 2017 Content Version: 11.7 © 8723-5652 [employer]. Care instructions adapted under license by [employer] (which disclaims liability or warranty for this information). If you have questions about a medical condition or this instruction, always ask your healthcare professional. [place] [address] any warranty or liability for your use of this information. Introducing [facility]! [employer] introduces [employer] patient portal. Now you can access parts of your medical record, email your doctor's office, and request medication refills online. 1. In your internet browser, go to [email or web address] 2. Click on the First Time User? Click Here link in the Sign In box. You will see the New Member Sign Up page. 3. Enter your [facility] Access Code exactly as it appears below. You will not need to use this code after youve completed the sign-up process. If you do not sign up before the expiration date, you must request a new code. · [facility] Access Code: [email or web address] Expires: 11/5/2018  7:20 AM 
 
4.  Enter the last four digits of your Social Security Number (xxxx) and Date of Birth (mm/dd/yyyy) as indicated and click Submit. You will be taken to the next sign-up page. 5. Create a [employer] ID. This will be your [employer] login ID and cannot be changed, so think of one that is secure and easy to remember. 6. Create a [employer] password. You can change your password at any time. 7. Enter your Password Reset Question and Answer. This can be used at a later time if you forget your password. 8. Enter your e-mail address. You will receive e-mail notification when new information is available in [address] 9. Click Sign Up. You can now view and download portions of your medical record. 10. Click the Download Summary menu link to download a portable copy of your medical information. If you have questions, please visit the Frequently Asked Questions section of the [employer] website. Remember, [employer] is NOT to be used for urgent needs. For medical emergencies, dial 911. Now available from your iPhone and Android! Please provide this summary of care documentation to your next provider. Your primary care clinician is listed as Hailee Santiago. If you have any questions after today's visit, please call [phone].

## 2018-09-18 PROBLEM — R79.89 ELEVATED TSH: Status: ACTIVE | Noted: 2018-09-18

## 2018-09-18 NOTE — PROGRESS NOTES
This is the Subsequent Medicare Annual Wellness Exam, performed 12 months or more after the Initial AWV or the last Subsequent AWV 
  
I have reviewed the patient's medical history in detail and updated the computerized patient record.  
  
History  
  
    
Past Medical History:  
Diagnosis Date  Anxiety    
  began with menopause  Basal cell carcinoma of skin of face    
 Colon polyps    
  Dr Khalida Mon, January 2015 normal colonoscopy, need  year follow up  Diabetes mellitus type 2, diet-controlled ([facility] [address] 9/1/2015  Family history of skin cancer    
  sister- melanoma; son- several BCC  Hypercholesterolemia    
 Hypertension    
 IGT (impaired glucose tolerance)    
  pre diabetic  Osteopenia july 2015  PVC (premature ventricular contraction) February 2016  
  Dr Nevin Ovalle  Skin cancer March 2013  
  BCC, left cheek, Dr. Rios/Selina [id number] evaluation  Sun-damaged skin    
  As a child and teen pt had bad sunburns  Sunburn, blistering    
                       
     
Past Surgical History:  
Procedure Laterality Date  ENDOSCOPY, COLON, DIAGNOSTIC   12/09  
  5 years  HX CARPAL TUNNEL RELEASE      
  bilat [place] POLYPECTOMY   [id number]  
  colon  
  
      
Current Outpatient Prescriptions Medication Sig Dispense Refill  Lactobacillus acidophilus (PROBIOTIC PO) Take 1 Cap by mouth daily.      
 psyllium (METAMUCIL) powd Take  by mouth every other day.      
 amLODIPine (NORVASC) 2.5 mg tablet Take 1 Tab by mouth daily. 90 Tab 3  
 metoprolol succinate (TOPROL-XL) 25 mg XL tablet Take 1 Tab by mouth two (2) times a day. 180 Tab 3  
 glucose blood VI test strips (BLOOD GLUCOSE TEST) strip 1 Each by Does Not Apply route daily. Test blood sugar daily, ICD-10-CM: E11.9 100 Strip 3  
 PARoxetine (PAXIL) 10 mg tablet TAKE 1 TABLET EVERY DAY 90 Tab 3  Blood-Glucose Meter monitoring kit To measure blood sugar and average 1 time per day. Pleas provide lowest cost brand. Diagnosis code E11.9 1 Kit 0  
 glucose blood VI test strips (ASCENSIA AUTODISC VI, ONE TOUCH ULTRA TEST VI) strip To measure blood sugar and average 1 time per day. Pleas provide lowest cost brand. Diagnosis code E11.9 100 Strip 4  
 Lancets misc To measure blood sugar and average 1 time per day. Pleas provide lowest cost brand. Diagnosis code E11.9 2 Package 3  
 naproxen sodium (ALEVE) 220 mg cap Take 1 Cap by mouth as needed.      
 cholecalciferol (VITAMIN D3) 400 unit tab tablet Take  by mouth daily.      
 MULTIVITAMIN PO Take  by mouth as directed.      
 aspirin 81 mg tablet Take 81 mg by mouth every other day.      
 CALCIUM PO Take 600 mg by mouth daily.      
 FLUAD 2405-4744, 65 YR UP,,PF, syrg injection INJECT 0.5ML INTRAMUSCULARLY ONE TIME ONLY   0  
  
    
Allergies Allergen Reactions  Pcn [Penicillins] Rash, Swelling and Unknown (comments)  Sulfa (Sulfonamide Antibiotics) Unknown (comments)  
  Family History Problem Relation Age of Onset  Cancer Mother 39  
    uterine  Stroke Mother    
 Dementia Mother    
 Other Father    
    Parkinsons  Crohn's Disease Daughter    
 Dementia Maternal Aunt    
    all 5 mat aunts  Colon Cancer Maternal Grandmother    
 Colon Cancer Paternal Grandfather    
  
       
Social History Substance Use Topics  Smoking status: Never Smoker  Smokeless tobacco: Never Used  Alcohol use 0.0 - 0.6 oz/week  
    0 - 1 Standard drinks or equivalent per week  
      Comment: occasional   
  
Patient Active Problem List  
Diagnosis Code  
 HTN (hypertension) I10  
 Anxiety F41.9  Vitamin D deficiency E55.9  Postmenopausal Z78.0  Ventricular trigeminy I49.8  Diabetes mellitus type 2, diet-controlled (HCC) E11.9  
 Colon polyps K63.5  Osteopenia M85.80  PVC (premature ventricular contraction) I49.3  Skin cancer C44.90  
  
  
 Depression Risk Factor Screening:  
  
PHQ over the last two weeks 9/17/2018 Little interest or pleasure in doing things Not at all Feeling down, depressed, irritable, or hopeless Not at all Total Score PHQ 2 0  
  
Alcohol Risk Factor Screening: You do not drink alcohol or very rarely. 
  
Functional Ability and Level of Safety:  
Hearing Loss Hearing is good. 
  
Activities of Daily Living The home contains: grab bars Patient does total self care 
  
Fall Risk Fall Risk Assessment, last 12 mths 9/17/2018 Able to walk? Yes Fall in past 12 months? No  
Fall with injury? -  
Number of falls in past 12 months - Fall Risk Score -  
  
Abuse Screen Patient is not abused 
  
Cognitive Screening Evaluation of Cognitive Function: 
Has your family/caregiver stated any concerns about your memory: no 
Mother had dementia. Mother diagnosed with Alzheimers. Notes some difficulty with fluidity of words. States family is very aware of it.  
  
Patient Care Team  
Patient Care Team: 
Hannah Rocha MD as PCP - General (Internal Medicine) Ofelia Jaramillo MD (Dermatology) Neri Morales MD (Cardiology) 
  
Assessment/Plan Education and counseling provided: 
Are appropriate based on today's review and evaluation 
  
Diagnoses and all orders for this visit: 
  
1. Medicare annual wellness visit, subsequent Well woman (non-gyn) exam: history and exam revealed issues as noted below. Cancer screening: 
 - colon cancer screening: due in 2020, following with Dr. Yuan Siddiqui for irritable bowel with incontinence. - breast cancer screen: would like to consider screening mammogram every other year. never smoker. Vaccine status: up to date Cardiovascular risk: BP well controlled Bone health: daily vit D supplement. Diet and Exercise: not drinking sugary beverages.  
  
Diabetes review and management as well as other chronic conditions.   
 
HPI 
 Juan Pablo Dovre is a 67 y.o. female, she presents today for: 
  
 Seen by gastroenterologist. Had further testing. Was advised to have colonoscopy. Has also been advised to have metamucil. Finds that her constipation is much more manamangable (loose stools).  
  
Diabetes: measuring blood sugar at home and trying to stay away from sweets.  
  
Notes that she has stopped her exercise. Stopped when diagnosed with degenerative disc disease. Does foresee getting back into.  
  
HTN: diastolic typically 93-39. systolic 
  
PMH/PSH: reviewed and updated Sochx:  reports that she has never smoked. She has never used smokeless tobacco. She reports that she drinks alcohol. She reports that she does not use illicit drugs. Famhx: reviewed and updated 
   
All: Allergies Allergen Reactions  Pcn [Penicillins] Rash, Swelling and Unknown (comments)  Sulfa (Sulfonamide Antibiotics) Unknown (comments)  
  Med:  
    
Current Outpatient Prescriptions Medication Sig  Lactobacillus acidophilus (PROBIOTIC PO) Take 1 Cap by mouth daily.  psyllium (METAMUCIL) powd Take  by mouth every other day.  amLODIPine (NORVASC) 2.5 mg tablet Take 1 Tab by mouth daily.  metoprolol succinate (TOPROL-XL) 25 mg XL tablet Take 1 Tab by mouth two (2) times a day.  glucose blood VI test strips (BLOOD GLUCOSE TEST) strip 1 Each by Does Not Apply route daily. Test blood sugar daily, ICD-10-CM: E11.9  
 PARoxetine (PAXIL) 10 mg tablet TAKE 1 TABLET EVERY DAY  Blood-Glucose Meter monitoring kit To measure blood sugar and average 1 time per day. Pleas provide lowest cost brand. Diagnosis code E11.9  
 glucose blood VI test strips (ASCENSIA AUTODISC VI, ONE TOUCH ULTRA TEST VI) strip To measure blood sugar and average 1 time per day. Pleas provide lowest cost brand. Diagnosis code E11.9  Lancets misc To measure blood sugar and average 1 time per day. Pleas provide lowest cost brand. Diagnosis code E11.9  naproxen sodium (ALEVE) 220 mg cap Take 1 Cap by mouth as needed.  cholecalciferol (VITAMIN D3) 400 unit tab tablet Take  by mouth daily.  MULTIVITAMIN PO Take  by mouth as directed.  aspirin 81 mg tablet Take 81 mg by mouth every other day.  CALCIUM PO Take 600 mg by mouth daily.  FLUAD 4549-9658, 65 YR UP,,PF, syrg injection INJECT 0.5ML INTRAMUSCULARLY ONE TIME ONLY  
  
No current facility-administered medications for this visit.   
  
Review of Systems Constitutional: Negative for chills, fever and malaise/fatigue. Respiratory: Negative for cough, shortness of breath and wheezing. Cardiovascular: Negative for chest pain, palpitations (none since visit with cardiologist earlier this summer), orthopnea and leg swelling. Gastrointestinal: Negative for abdominal pain, nausea and vomiting. Psychiatric/Behavioral: Negative for depression. 10 point ROS normal except as noted in HPI. PE: 
Blood pressure 141/63, pulse (!) 53, temperature 98 °F (36.7 °C), temperature source Oral, resp. rate 16, height 4' 10\" (1.473 m), weight 147 lb (66.7 kg), SpO2 99 %. Body mass index is 30.72 kg/(m^2). Physical Exam  
Constitutional: She is oriented to person, place, and time. She appears well-developed and well-nourished. No distress. HENT:  
Head: Normocephalic. Mouth/Throat: Oropharynx is clear and moist.  
Eyes: Conjunctivae and EOM are normal.  
Neck: Neck supple. Cardiovascular: Normal rate, regular rhythm and normal heart sounds. Pulmonary/Chest: Effort normal and breath sounds normal.  
Abdominal: Soft. She exhibits no distension. There is no tenderness. Musculoskeletal: She exhibits no edema. Neurological: She is alert and oriented to person, place, and time. Skin: Skin is warm and dry. Nursing note and vitals reviewed. 
  
Diabetic foot exam:  
Left: Filament test 6/6 Pulse DP: 2+ (normal) Pulse PT: 2+ (normal) Deformities: None Right: Filament test 6/6 
 Pulse DP: 2+ (normal) Pulse PT: 2+ (normal) Deformities: None Labs:  none FRAX data:  
Fracture after age 22: No 
Current smoking: No 
Steroid hx: No 
RA: No 
2° osteoporosis RF: No 
ETOH > 3 drinks /day: No 
  
*(current or > 3 months of treatment) **(IDDM, hyperthyroid, hypogonad, premature menopause, CLD, chronic malnutrition, or malabsorption) 
  
A/P: 
67 y.o. female ICD-10-CM ICD-9-CM 1. Medicare annual wellness visit, subsequent Z00.00 V70.0 2. Diabetes mellitus type 2, diet-controlled (HCC) E11.9 250.00 AMB POC HEMOGLOBIN A1C AMB POC URINE, MICROALBUMIN, SEMIQUANT (3 RESULTS)  DIABETES FOOT EXAM  
   glucose blood VI test strips (BLOOD GLUCOSE TEST) strip 3. Essential hypertension I10 401.9 4. RAMANDEEP (generalized anxiety disorder) F41.1 300.02 PARoxetine (PAXIL) 10 mg tablet Diabetes: last monitored in office 12 months ago. Completing home glucose testing and fasting numbers also reflect control. Slight rise in A1C over last 12 months. Very likely reflecting lack of exercise. This is patient's decision, and unclear if she wants to resume, I did recommend this. - eye: following with Dr. Boyer [facility]. - foot exam: normal today 
 - influenza vaccine, declined. - august bmp shows good kidney function 
 - microalbumin in normal range. RAMANDEEP: minimally reviewed today, continues on paxil. Denies any active symptoms of depression/anxiety. HTN: BP a little up in office today (in the midst of heavy thunderstorms). Reporting better numbers at home. Defer changes to primary cardiologist, Dr. Melisa Morrell. Elevated TSH: with normal free T4, see on labs in august drawn by cardiology. Fecal incontinence: improved with metamucil, following with Dr. Fernando Eckert. GI 
 
- She was given AVS and expressed understanding with the diagnosis and plan as discussed. Follow-up Disposition: 
Return in about 6 months (around 3/17/2019) for follow-up diabetes. Tawny Beavers

## 2018-10-11 ENCOUNTER — TELEPHONE (OUTPATIENT)
Dept: CARDIOLOGY CLINIC | Age: 73
End: 2018-10-11

## 2018-10-11 NOTE — LETTER
10/12/2018 2:06 PM 
 
Ms. Sujit Villalta [address] [place] [id number] Dear Dr. Allison Oneill Fax # [phone] Ms Octavia Watters is currently under the care of [address] [place] [address]. She is low risk for cardiac complications during non-cardiac surgery. She may hold her Aspirin 5 days prior. No further cardiac evaluation is indicated at this time. Please do not hesitate to contact me with questions. Sincerely, Shyanne Hernandez MD

## 2018-10-11 NOTE — TELEPHONE ENCOUNTER
Patient is requesting clearance for a Colonoscopy with Dr. Wilfrido Loo, she takes a daily ASA. Fax # [phone]  Phone # [phone]. Please advise    2 pt identifiers used      Clearance letter faxed to Dr Wilfrido Loo. Left a voicemail message with patient with information stating she is cleared and to hold ASA 5 days prior.

## 2018-10-11 NOTE — TELEPHONE ENCOUNTER
The following loop monitor results given per Dr. Matthew Kendrick. Loop monitor:  Looks fine   Patient states no heart racing and she has been feeling fine. No complaint.

## 2018-10-12 NOTE — TELEPHONE ENCOUNTER
She may hold ASA x 5 days for colonoscopy. Low risk for cardiovascular complications during procedure, stress test negative for ischemia recently.

## 2018-11-01 DIAGNOSIS — F41.1 GAD (GENERALIZED ANXIETY DISORDER): ICD-10-CM

## 2018-11-03 RX ORDER — PAROXETINE 10 MG/1
TABLET, FILM COATED ORAL
Qty: 90 TAB | Refills: 3 | Status: SHIPPED | OUTPATIENT
Start: 2018-11-03 | End: 2019-09-06 | Stop reason: SDUPTHER

## 2018-11-29 ENCOUNTER — HOSPITAL ENCOUNTER (OUTPATIENT)
Dept: MAMMOGRAPHY | Age: 73
Discharge: HOME | End: 2018-11-29
Attending: INTERNAL MEDICINE
Payer: MEDICARE

## 2018-11-29 DIAGNOSIS — Z12.39 SCREENING BREAST EXAMINATION: ICD-10-CM

## 2018-11-29 PROCEDURE — 77067 SCR MAMMO BI INCL CAD: CPT

## 2018-12-20 ENCOUNTER — OFFICE VISIT (OUTPATIENT)
Dept: CARDIOLOGY CLINIC | Age: 73
End: 2018-12-20

## 2018-12-20 VITALS
DIASTOLIC BLOOD PRESSURE: 80 MMHG | SYSTOLIC BLOOD PRESSURE: 144 MMHG | HEART RATE: 56 BPM | HEIGHT: 58 IN | OXYGEN SATURATION: 97 % | RESPIRATION RATE: 18 BRPM | BODY MASS INDEX: 31.28 KG/M2 | WEIGHT: 149 LBS

## 2018-12-20 DIAGNOSIS — I47.1 SVT (SUPRAVENTRICULAR TACHYCARDIA) (HCC): Primary | ICD-10-CM

## 2018-12-20 DIAGNOSIS — I49.3 PVC (PREMATURE VENTRICULAR CONTRACTION): ICD-10-CM

## 2018-12-20 DIAGNOSIS — I10 ESSENTIAL HYPERTENSION: ICD-10-CM

## 2018-12-20 DIAGNOSIS — I47.1 ATRIAL TACHYCARDIA (HCC): ICD-10-CM

## 2018-12-20 RX ORDER — METOPROLOL SUCCINATE 25 MG/1
25 TABLET, EXTENDED RELEASE ORAL DAILY
Qty: 90 TAB | Refills: 3 | Status: SHIPPED | OUTPATIENT
Start: 2018-12-20 | End: 2019-07-18 | Stop reason: SDUPTHER

## 2018-12-20 NOTE — PROGRESS NOTES
ZE[facility] [place]:   [phone]    HPI: Arcenio Beckham, a 75y.o. year-old who presents for follow up regarding her PVCs, atrial tachycardia. She is feeling well, no palpitations, in NSR on ECG today  Still undergoing GI workup, getting colonoscopy in January   Only has dizziness every few months, mostly with movement of the head  Denies any syncope  No LE edema  No dyspnea with exertion or PND  No chest pain   Taking Toprol XL once daily   BP around 130/80 at home   Probably going to see orthopedics about her hip and knee pain soon  Bruises easily even taking ASA every other day   She has not exercised x 1 year due to orthopedic issues    Assessment/Plan:  1. Body mass index is 31.14 kg/m². encouraged exercise  2. PVCs, PACs, atrial tachycardia/SVT - well controlled on Toprol XL 25mg daily  -loop monitor in 8/18 without arrhythmias  -follow up in the office in 1 year   3. Dyspnea with exertion - none currently, no ischemia on stress test 8/18, echo ok 8/18   4. HTN- well controlled on amlodipine 2.5mg daily and Toprol XL 25mg daily    5. Dyslipidemia- , diet controlled for now, ca score of zero in 2016  6. Chest tightness/squeezing - none currently, no ischemia noted on nuclear stress test 8/18   7. Anxiety - on paxil    8. IGT - A1c 6.2%, encouraged exercise     Loop Monitor 8/18 - no arrhythmias  Echo 9/18 - LV mildly dilated, LVEF 63%, no WMA, LA mildly dilated.   Nuc Stress 8/18 - no ischemia, LVEF 64%  Coronary calcium scan 2016 - zero   Stress Echo 9/15 exe 7:00 anteroseptal and apical inferior hypokinesis, AXEL  Holter 9/15 PVC, PAC, atach 133    Soc no tob no etoh  Fhx cancer, CVA no early CAD    She  has a past medical history of Anxiety, Basal cell carcinoma of skin of face, Colon polyps, Diabetes mellitus type 2, diet-controlled ([address] Family history of skin cancer, Hypercholesterolemia, Hypertension, IGT (impaired glucose tolerance), Osteopenia, PVC (premature ventricular contraction), Skin cancer, Sun-damaged skin, and Sunburn, blistering. Cardiovascular ROS: negative for palpitations or chest pain    Respiratory ROS: negative for - cough or orthopnea  Neurological ROS: no TIA or stroke symptoms  All other systems negative except as above. PE  Vitals:    12/20/18 0922   BP: 144/80   Pulse: (!) 56   Resp: 18   SpO2: 97%   Weight: 149 lb (67.6 kg)   Height: 4' 10\" (1.473 m)    Body mass index is 31.14 kg/m².    General appearance - alert, well appearing, and in no distress  Mental status - affect appropriate to mood  Eyes - sclera anicteric, moist mucous membranes  Neck - supple, no carotid bruits   Lymphatics - not assessed   Chest - clear to auscultation, no wheezes, rales or rhonchi  Heart - regular rate and rhythm, normal S1, S2, no murmurs, rubs, clicks or gallops  Abdomen - soft, nontender, nondistended  Back exam - full range of motion, no tenderness  Neurological - cranial nerves II through XII grossly intact, no focal deficit  Musculoskeletal - no muscular tenderness noted, normal strength  Extremities - peripheral pulses normal, no pedal edema  Skin - normal coloration  no rashes    12 lead ECG: NSR     Recent Labs:  Lab Results   Component Value Date/Time    Cholesterol, total 219 (H) 08/28/2017 10:20 AM    HDL Cholesterol 80 08/28/2017 10:20 AM    LDL, calculated 118 (H) 08/28/2017 10:20 AM    Triglyceride 106 08/28/2017 10:20 AM     Lab Results   Component Value Date/Time    Creatinine 0.78 08/07/2018 11:02 AM     Lab Results   Component Value Date/Time    BUN 22 08/07/2018 11:02 AM     Lab Results   Component Value Date/Time    Potassium 5.0 08/07/2018 11:02 AM     Lab Results   Component Value Date/Time    Hemoglobin A1c 6.2 (H) 08/28/2017 10:20 AM     Lab Results   Component Value Date/Time    HGB 15.2 08/07/2018 11:02 AM     Lab Results   Component Value Date/Time    PLATELET 984 13/34/5792 11:02 AM       Reviewed:  Past Medical History:   Diagnosis Date    Anxiety began with menopause    Basal cell carcinoma of skin of face     Colon polyps     Dr Mello Pena, January 2015 normal colonoscopy, need  year follow up    Diabetes mellitus type 2, diet-controlled ([address] 9/1/2015    Family history of skin cancer     sister- melanoma; son- several BCC    Hypercholesterolemia     Hypertension     IGT (impaired glucose tolerance)     pre diabetic    Osteopenia july 2015    PVC (premature ventricular contraction) February 2016    Dr Joseph Jimenez Skin cancer March 2013    [facility], left cheek, Dr. Rios/Selina [id number] evaluation    Sun-damaged skin     As a child and teen pt had bad sunburns    Sunburn, blistering      Social History     Tobacco Use   Smoking Status Never Smoker   Smokeless Tobacco Never Used     Social History     Substance and Sexual Activity   Alcohol Use Yes    Alcohol/week: 0.0 - 0.6 oz    Comment: occasional     Allergies   Allergen Reactions    Pcn [Penicillins] Rash, Swelling and Unknown (comments)    Sulfa (Sulfonamide Antibiotics) Unknown (comments)       Current Outpatient Medications   Medication Sig    metoprolol succinate (TOPROL-XL) 25 mg XL tablet Take 1 Tab by mouth daily.  PARoxetine (PAXIL) 10 mg tablet TAKE ONE TABLET BY MOUTH ONE TIME DAILY    FLUAD 2117-7784, 65 YR UP,,PF, syrg injection INJECT 0.5ML INTRAMUSCULARLY ONE TIME ONLY    glucose blood VI test strips (BLOOD GLUCOSE TEST) strip 1 Each by Does Not Apply route daily. Test blood sugar daily, ICD-10-CM: E11.9    psyllium (METAMUCIL) powd Take  by mouth every other day.  amLODIPine (NORVASC) 2.5 mg tablet Take 1 Tab by mouth daily.  Blood-Glucose Meter monitoring kit To measure blood sugar and average 1 time per day. Pleas provide lowest cost brand. Diagnosis code E11.9    glucose blood VI test strips (ASCENSIA AUTODISC VI, ONE TOUCH ULTRA TEST VI) strip To measure blood sugar and average 1 time per day. Pleas provide lowest cost brand.  Diagnosis code E11.9    Lancets misc To measure blood sugar and average 1 time per day. Pleas provide lowest cost brand. Diagnosis code E11.9    naproxen sodium (ALEVE) 220 mg cap Take 1 Cap by mouth as needed.  cholecalciferol (VITAMIN D3) 400 unit tab tablet Take  by mouth daily.  MULTIVITAMIN PO Take  by mouth as directed.  aspirin 81 mg tablet Take 81 mg by mouth every other day.  CALCIUM PO Take 600 mg by mouth daily. No current facility-administered medications for this visit.         Norval Goldmann, SAMANTHA Ching [facility] [place]  [address], [address], [address] [place] [address]

## 2019-03-11 ENCOUNTER — OFFICE VISIT (OUTPATIENT)
Dept: INTERNAL MEDICINE CLINIC | Age: 74
End: 2019-03-11

## 2019-03-11 VITALS
HEART RATE: 57 BPM | DIASTOLIC BLOOD PRESSURE: 68 MMHG | WEIGHT: 148.6 LBS | SYSTOLIC BLOOD PRESSURE: 132 MMHG | HEIGHT: 58 IN | BODY MASS INDEX: 31.19 KG/M2 | TEMPERATURE: 98.5 F | OXYGEN SATURATION: 96 %

## 2019-03-11 DIAGNOSIS — R91.1 NODULE OF RIGHT LUNG: ICD-10-CM

## 2019-03-11 DIAGNOSIS — I10 ESSENTIAL HYPERTENSION: ICD-10-CM

## 2019-03-11 DIAGNOSIS — R93.89 ABNORMAL CXR: Primary | ICD-10-CM

## 2019-03-11 DIAGNOSIS — R79.89 ELEVATED TSH: ICD-10-CM

## 2019-03-11 DIAGNOSIS — E11.9 DIABETES MELLITUS TYPE 2, DIET-CONTROLLED (HCC): ICD-10-CM

## 2019-03-11 NOTE — PROGRESS NOTES
Herman Menon is a 75 y.o. female    Room 3    Chief Complaint   Patient presents with    Cold Symptoms     cough, congestion x 6 days, continued    Abnormal Chest X Ray     MRI recomended by Pt 1st after abnormal chest x-ray 3/9/19     1. Have you been to the ER, urgent care clinic since your last visit? Hospitalized since your last visit? Pt 1st [address], abnormal x-ray, broncitis  2. Have you seen or consulted any other health care providers outside of the [address] [place] [address] since your last visit? Include any pap smears or colon screening.  GI - Anahi Margaret    Visit Vitals  /68 (BP 1 Location: Left arm, BP Patient Position: Sitting)   Pulse (!) 57   Temp 98.5 °F (36.9 °C) (Oral)   Ht 4' 10\" (1.473 m)   Wt 148 lb 9.6 oz (67.4 kg)   SpO2 96%   BMI 31.06 kg/m²

## 2019-03-11 NOTE — PATIENT INSTRUCTIONS
You are low risk for primary lung cancer. But this CT of the lungs will further evaluate. Lung nodules are very common and typically do not represent a cancer. Learning About Lung Nodules  What is a lung nodule? A lung nodule is a growth in the lung. A single nodule surrounded by lung tissue is called a solitary pulmonary nodule. A lung nodule might not cause any symptoms. Your doctor may have found one or more nodules on your lung when you were having a chest X-ray or CT scan. Or it may have been found during a lung cancer screening. A lung nodule may be caused by an old infection or cancer. It might also be a noncancerous growth. Lung nodules can cause a screening to give an abnormal result. Most nodules do not cause any harm. But without further tests, your doctor can't tell whether an abnormal finding is cancer, a harmless nodule, or something else. What can you expect when you have a lung nodule? Your doctor will look at several risk factors to see how likely it is that the nodule is cancer. He or she will look at:  · Whether you smoke or have ever smoked. · Your age and your family's medical history. · Whether you have ever had lung cancer. · The size and shape of the nodule. · Whether the nodule has changed in size. Your doctor may look at past chest X-rays or CT scans, if available, and compare them. Or you may have a series of CT scans to see if the nodule grows over time. What happens next depends on the risk of the nodule being cancer. · If you have no risk factors and the nodule is small, your doctor may advise doing nothing. · If the risk is small, your doctor may schedule follow-up appointments and tests. You may have more CT scans later to see if the nodule is growing. If the nodule hasn't changed in 3 to 6 months, you may have CT scans every year. If it hasn't changed in 2 years, you may not need any more tests. · If there's a higher risk of cancer, your doctor may:  ?  Do a PET scan, which may help tell if the nodule is cancerous or not. ? Take a sample of tissue from the nodule for testing. This is called a biopsy. ? Remove the nodule with surgery. Follow-up care is a key part of your treatment and safety. Be sure to make and go to all appointments, and call your doctor if you are having problems. It's also a good idea to know your test results and keep a list of the medicines you take. Where can you learn more? Go to http://thee-luis angel.info/. Enter P063 in the search box to learn more about \"Learning About Lung Nodules. \"  Current as of: March 27, 2018  Content Version: 11.9  © 6746-6492 [employer]. Care instructions adapted under license by [employer] (which disclaims liability or warranty for this information). If you have questions about a medical condition or this instruction, always ask your healthcare professional. [address] any warranty or liability for your use of this information.

## 2019-03-11 NOTE — PROGRESS NOTES
HPI   Thora Cooks is a 75 y.o. female, she presents today for:    Has been feeling cough, and difficulty breathing until ~ 6 days ago. Started feeling achy \"and even my skin hurts\". Started feeling fever, then hacking cough. Was having a hard time getting rid of fever, so then [facility]     No shortness of breath. No coughing up blood. Clear mucous on production. No particular exposures to asbestos. PMH/PSH: reviewed and updated  Sochx:  reports that  has never smoked. she has never used smokeless tobacco. She reports that she drinks alcohol. She reports that she does not use drugs. Famhx: reviewed and updated     All: Allergies   Allergen Reactions    Pcn [Penicillins] Rash, Swelling and Unknown (comments)    Sulfa (Sulfonamide Antibiotics) Unknown (comments)     Med:   Current Outpatient Medications   Medication Sig    Lactobacillus acidophilus (PROBIOTIC PO) Take  by mouth.  metoprolol succinate (TOPROL-XL) 25 mg XL tablet Take 1 Tab by mouth daily.  PARoxetine (PAXIL) 10 mg tablet TAKE ONE TABLET BY MOUTH ONE TIME DAILY    glucose blood VI test strips (BLOOD GLUCOSE TEST) strip 1 Each by Does Not Apply route daily. Test blood sugar daily, ICD-10-CM: E11.9    psyllium (METAMUCIL) powd Take  by mouth every other day.  amLODIPine (NORVASC) 2.5 mg tablet Take 1 Tab by mouth daily.  Blood-Glucose Meter monitoring kit To measure blood sugar and average 1 time per day. Pleas provide lowest cost brand. Diagnosis code E11.9    glucose blood VI test strips (ASCENSIA AUTODISC VI, ONE TOUCH ULTRA TEST VI) strip To measure blood sugar and average 1 time per day. Pleas provide lowest cost brand. Diagnosis code E11.9    Lancets misc To measure blood sugar and average 1 time per day. Pleas provide lowest cost brand. Diagnosis code E11.9    naproxen sodium (ALEVE) 220 mg cap Take 1 Cap by mouth as needed.  cholecalciferol (VITAMIN D3) 400 unit tab tablet Take  by mouth daily.     MULTIVITAMIN PO Take  by mouth as directed.  aspirin 81 mg tablet Take 81 mg by mouth every other day.  CALCIUM PO Take 600 mg by mouth daily.  FLUAD [id number], 65 YR UP,,PF, syrg injection INJECT 0.5ML INTRAMUSCULARLY ONE TIME ONLY     No current facility-administered medications for this visit. ROS    PE:  Blood pressure 132/68, pulse (!) 57, temperature 98.5 °F (36.9 °C), temperature source Oral, height 4' 10\" (1.473 m), weight 148 lb 9.6 oz (67.4 kg), SpO2 96 %. Body mass index is 31.06 kg/m². Physical Exam   Constitutional: She is oriented to person, place, and time. She appears well-developed and well-nourished. No distress. HENT:   Head: Normocephalic. Mouth/Throat: Oropharynx is clear and moist.   Eyes: Conjunctivae and EOM are normal.   Neck: Neck supple. Cardiovascular: Normal rate, regular rhythm and normal heart sounds. No murmur heard. Pulmonary/Chest: Effort normal and breath sounds normal. No respiratory distress. Abdominal: Soft. Musculoskeletal: She exhibits no edema. Neurological: She is alert and oriented to person, place, and time. Skin: Skin is warm and dry. Nursing note and vitals reviewed. Labs:   See addendum    A/P:  75 y.o. female    ICD-10-CM ICD-9-CM    1. Abnormal CXR R93.89 793.2 CT CHEST WO CONT   2. Nodule of right lung R91.1 793.11 CT CHEST WO CONT   3. Essential hypertension I10 401.9 CBC WITH AUTOMATED DIFF      METABOLIC PANEL, COMPREHENSIVE      TSH 3RD GENERATION   4. Diabetes mellitus type 2, diet-controlled (HCC) E11.9 250.00 HEMOGLOBIN A1C WITH EAG      CBC WITH AUTOMATED DIFF      METABOLIC PANEL, COMPREHENSIVE      LIPID PANEL      TSH 3RD GENERATION   5. Elevated TSH R79.89 794.5 TSH 3RD GENERATION   ABN finding on cxr by report from external urgent care. Do not have original record nor reading. However reasonable to procede with standard CT chest to evaluate for nodule or similar.      HTN: BP overall well controlled, recheck labs today.     History of mild TSH elevation on last labs, repeat level today as previously requested    Diabetes: historically well controlled, with diet alone. Not interested in adding metformin. Monitoring labs requested today. - She was given AVS and expressed understanding with the diagnosis and plan as discussed. Follow-up and Dispositions    · Return in about 2 weeks (around 3/25/2019) for follow-up diabetes, lung CT results. (if you would like to draw labs prior, please make lab appt).

## 2019-03-13 ENCOUNTER — LAB ONLY (OUTPATIENT)
Dept: INTERNAL MEDICINE CLINIC | Age: 74
End: 2019-03-13

## 2019-03-13 ENCOUNTER — HOSPITAL ENCOUNTER (OUTPATIENT)
Dept: LAB | Age: 74
Discharge: HOME | End: 2019-03-13
Payer: MEDICARE

## 2019-03-13 DIAGNOSIS — I10 ESSENTIAL HYPERTENSION: ICD-10-CM

## 2019-03-13 DIAGNOSIS — E11.9 DIABETES MELLITUS TYPE 2, DIET-CONTROLLED (HCC): ICD-10-CM

## 2019-03-13 DIAGNOSIS — R79.89 ELEVATED TSH: ICD-10-CM

## 2019-03-13 PROCEDURE — 83036 HEMOGLOBIN GLYCOSYLATED A1C: CPT

## 2019-03-13 PROCEDURE — 85025 COMPLETE CBC W/AUTO DIFF WBC: CPT

## 2019-03-13 PROCEDURE — 80053 COMPREHEN METABOLIC PANEL: CPT

## 2019-03-13 PROCEDURE — 84443 ASSAY THYROID STIM HORMONE: CPT

## 2019-03-13 PROCEDURE — 80061 LIPID PANEL: CPT

## 2019-03-14 ENCOUNTER — TELEPHONE (OUTPATIENT)
Dept: INTERNAL MEDICINE CLINIC | Age: 74
End: 2019-03-14

## 2019-03-14 ENCOUNTER — HOSPITAL ENCOUNTER (OUTPATIENT)
Dept: CT IMAGING | Age: 74
Discharge: HOME | End: 2019-03-14
Attending: INTERNAL MEDICINE
Payer: MEDICARE

## 2019-03-14 DIAGNOSIS — R91.1 NODULE OF RIGHT LUNG: ICD-10-CM

## 2019-03-14 DIAGNOSIS — R93.89 ABNORMAL CXR: ICD-10-CM

## 2019-03-14 DIAGNOSIS — Q33.0: ICD-10-CM

## 2019-03-14 LAB
ALBUMIN SERPL-MCNC: 3.9 G/DL (ref 3.5–4.8)
ALBUMIN/GLOB SERPL: 1.6 {RATIO} (ref 1.2–2.2)
ALP SERPL-CCNC: 73 IU/L (ref 39–117)
ALT SERPL-CCNC: 31 IU/L (ref 0–32)
AST SERPL-CCNC: 24 IU/L (ref 0–40)
BASOPHILS # BLD AUTO: 0 X10E3/UL (ref 0–0.2)
BASOPHILS NFR BLD AUTO: 1 %
BILIRUB SERPL-MCNC: 0.5 MG/DL (ref 0–1.2)
BUN SERPL-MCNC: 17 MG/DL (ref 8–27)
BUN/CREAT SERPL: 24 (ref 12–28)
CALCIUM SERPL-MCNC: 9 MG/DL (ref 8.7–10.3)
CHLORIDE SERPL-SCNC: 101 MMOL/L (ref 96–106)
CHOLEST SERPL-MCNC: 169 MG/DL (ref 100–199)
CO2 SERPL-SCNC: 25 MMOL/L (ref 20–29)
CREAT SERPL-MCNC: 0.72 MG/DL (ref 0.57–1)
EOSINOPHIL # BLD AUTO: 0.3 X10E3/UL (ref 0–0.4)
EOSINOPHIL NFR BLD AUTO: 5 %
ERYTHROCYTE [DISTWIDTH] IN BLOOD BY AUTOMATED COUNT: 13.7 % (ref 12.3–15.4)
EST. AVERAGE GLUCOSE BLD GHB EST-MCNC: 157 MG/DL
GLOBULIN SER CALC-MCNC: 2.5 G/DL (ref 1.5–4.5)
GLUCOSE SERPL-MCNC: 132 MG/DL (ref 65–99)
HBA1C MFR BLD: 7.1 % (ref 4.8–5.6)
HCT VFR BLD AUTO: 42.4 % (ref 34–46.6)
HDLC SERPL-MCNC: 71 MG/DL
HGB BLD-MCNC: 14.4 G/DL (ref 11.1–15.9)
IMM GRANULOCYTES # BLD AUTO: 0 X10E3/UL (ref 0–0.1)
IMM GRANULOCYTES NFR BLD AUTO: 0 %
LDLC SERPL CALC-MCNC: 74 MG/DL (ref 0–99)
LYMPHOCYTES # BLD AUTO: 1.2 X10E3/UL (ref 0.7–3.1)
LYMPHOCYTES NFR BLD AUTO: 22 %
MCH RBC QN AUTO: 30.8 PG (ref 26.6–33)
MCHC RBC AUTO-ENTMCNC: 34 G/DL (ref 31.5–35.7)
MCV RBC AUTO: 91 FL (ref 79–97)
MONOCYTES # BLD AUTO: 0.4 X10E3/UL (ref 0.1–0.9)
MONOCYTES NFR BLD AUTO: 7 %
NEUTROPHILS # BLD AUTO: 3.5 X10E3/UL (ref 1.4–7)
NEUTROPHILS NFR BLD AUTO: 65 %
PLATELET # BLD AUTO: 297 X10E3/UL (ref 150–379)
POTASSIUM SERPL-SCNC: 4.6 MMOL/L (ref 3.5–5.2)
PROT SERPL-MCNC: 6.4 G/DL (ref 6–8.5)
RBC # BLD AUTO: 4.67 X10E6/UL (ref 3.77–5.28)
SODIUM SERPL-SCNC: 140 MMOL/L (ref 134–144)
TRIGL SERPL-MCNC: 118 MG/DL (ref 0–149)
TSH SERPL DL<=0.005 MIU/L-ACNC: 2.19 UIU/ML (ref 0.45–4.5)
VLDLC SERPL CALC-MCNC: 24 MG/DL (ref 5–40)
WBC # BLD AUTO: 5.4 X10E3/UL (ref 3.4–10.8)

## 2019-03-14 PROCEDURE — 71250 CT THORAX DX C-: CPT

## 2019-03-14 NOTE — TELEPHONE ENCOUNTER
Called and advised patient of cyst in lungs. Discussed how this very likely has been with her every day of her life, and just was incidentally found on chest x-ray. Discussed that I will seek second opinion on if any further investigation is recommended. Patient noted that she is leaving town on Tuesday next week for a 2 week vacation in [place]. Augusta Parsons MD    Spoke with Dr. Matthew Page, [facility]  On call. He agrees that as long as Ms. Bloom is asymptomatic there is unlikely to be much concern. Reasonable to repeat CT in 6 months. Also offered to see in clinic if patient prefers. Called patient and she expressed understanding and felt great reassurance. She will reach out if she starts having prolonged cough, or unexplained fevers. She had also already called her friend who is [place] [facility] Demetria Gerardo. She plans to have him also review her images, but notes that he was quite reassuring as well. At this time will plan to repeat CT in 6 months to assess further stability. Declined referral into office.      Augusta Parsons MD

## 2019-03-15 PROBLEM — Q33.0: Status: ACTIVE | Noted: 2019-03-15

## 2019-03-15 NOTE — PROGRESS NOTES
Labs in normal range except for mild increase in A1C. Will send letter encouraging reduction in sweet beverages, juices, carbs.    Crystal Freeman MD

## 2019-03-18 DIAGNOSIS — E11.9 DIABETES MELLITUS TYPE 2, DIET-CONTROLLED (HCC): ICD-10-CM

## 2019-03-18 RX ORDER — LANCETS 28 GAUGE
EACH MISCELLANEOUS
Qty: 100 LANCET | Refills: 3 | Status: SHIPPED | OUTPATIENT
Start: 2019-03-18 | End: 2021-01-11 | Stop reason: SDUPTHER

## 2019-07-18 RX ORDER — METOPROLOL SUCCINATE 25 MG/1
25 TABLET, EXTENDED RELEASE ORAL DAILY
Qty: 90 TAB | Refills: 2 | Status: SHIPPED | OUTPATIENT
Start: 2019-07-18 | End: 2020-04-10 | Stop reason: SDUPTHER

## 2019-07-18 NOTE — TELEPHONE ENCOUNTER
Requested Prescriptions     Signed Prescriptions Disp Refills    metoprolol succinate (TOPROL-XL) 25 mg XL tablet 90 Tab 2     Sig: Take 1 Tab by mouth daily.      Authorizing Provider: Lissy Pete     Ordering User: Holli Henriquez     Per verbal orders

## 2019-07-23 DIAGNOSIS — I10 ESSENTIAL HYPERTENSION: ICD-10-CM

## 2019-07-23 RX ORDER — AMLODIPINE BESYLATE 2.5 MG/1
2.5 TABLET ORAL DAILY
Qty: 90 TAB | Refills: 3 | Status: SHIPPED | OUTPATIENT
Start: 2019-07-23 | End: 2020-07-17

## 2019-07-23 RX ORDER — AMLODIPINE BESYLATE 2.5 MG/1
2.5 TABLET ORAL DAILY
Qty: 90 TAB | Refills: 3 | Status: SHIPPED | OUTPATIENT
Start: 2019-07-23 | End: 2019-07-23

## 2019-07-23 NOTE — TELEPHONE ENCOUNTER
Requested Prescriptions     Signed Prescriptions Disp Refills    amLODIPine (NORVASC) 2.5 mg tablet 90 Tab 3     Sig: Take 1 Tab by mouth daily.      Authorizing Provider: Edda Hernández     Ordering User: Giorgi Perdue     Per verbal orders

## 2019-09-23 DIAGNOSIS — E11.9 DIABETES MELLITUS TYPE 2, DIET-CONTROLLED (HCC): ICD-10-CM

## 2019-09-24 RX ORDER — CALCIUM CITRATE/VITAMIN D3 200MG-6.25
TABLET ORAL
Qty: 100 STRIP | Refills: 3 | Status: SHIPPED | OUTPATIENT
Start: 2019-09-24 | End: 2021-06-07 | Stop reason: SDUPTHER

## 2019-12-03 ENCOUNTER — OFFICE VISIT (OUTPATIENT)
Dept: INTERNAL MEDICINE CLINIC | Age: 74
End: 2019-12-03

## 2019-12-03 VITALS
SYSTOLIC BLOOD PRESSURE: 143 MMHG | DIASTOLIC BLOOD PRESSURE: 73 MMHG | BODY MASS INDEX: 31.91 KG/M2 | OXYGEN SATURATION: 96 % | HEART RATE: 55 BPM | WEIGHT: 152 LBS | HEIGHT: 58 IN | TEMPERATURE: 97.9 F | RESPIRATION RATE: 17 BRPM

## 2019-12-03 DIAGNOSIS — M85.80 OSTEOPENIA, UNSPECIFIED LOCATION: ICD-10-CM

## 2019-12-03 DIAGNOSIS — E11.9 DIABETES MELLITUS TYPE 2, DIET-CONTROLLED (HCC): ICD-10-CM

## 2019-12-03 DIAGNOSIS — R79.89 ELEVATED TSH: ICD-10-CM

## 2019-12-03 DIAGNOSIS — E55.9 VITAMIN D DEFICIENCY: ICD-10-CM

## 2019-12-03 DIAGNOSIS — Z00.00 MEDICARE ANNUAL WELLNESS VISIT, SUBSEQUENT: Primary | ICD-10-CM

## 2019-12-03 DIAGNOSIS — K63.5 POLYP OF COLON, UNSPECIFIED PART OF COLON, UNSPECIFIED TYPE: ICD-10-CM

## 2019-12-03 DIAGNOSIS — Z12.11 SCREEN FOR COLON CANCER: ICD-10-CM

## 2019-12-03 DIAGNOSIS — E66.09 CLASS 1 OBESITY DUE TO EXCESS CALORIES WITHOUT SERIOUS COMORBIDITY WITH BODY MASS INDEX (BMI) OF 31.0 TO 31.9 IN ADULT: ICD-10-CM

## 2019-12-03 DIAGNOSIS — Z78.0 POST-MENOPAUSAL: ICD-10-CM

## 2019-12-03 DIAGNOSIS — Q33.0: ICD-10-CM

## 2019-12-03 DIAGNOSIS — Z12.31 ENCOUNTER FOR SCREENING MAMMOGRAM FOR MALIGNANT NEOPLASM OF BREAST: ICD-10-CM

## 2019-12-03 DIAGNOSIS — I10 ESSENTIAL HYPERTENSION: ICD-10-CM

## 2019-12-03 LAB
ALBUMIN UR QL STRIP: 10 MG/L
CREATININE, URINE POC: 200 MG/DL
HBA1C MFR BLD HPLC: 6.6 %
MICROALBUMIN/CREAT RATIO POC: <30 MG/G

## 2019-12-03 NOTE — PROGRESS NOTES
HPI   Cecille Paul is a 75 y.o. female, she presents today for:    75year old woman with hisotory of bronchogenic cyst seen on CT in March. Disucssed 6 month follow-up imaging.     - reports that she is following with Dr. Iglesia Montes De Oca and has a follow-up in January for imaging.     - overall: feeling achey in joints, and notes some increased in hand problems. Notes that she has a large cyst on left wrist. It seems to be growing also     Notes that toes are getting more sensitive on top, despite wearing shoes that don't pace pressure on that area. Like rug burn. No sores no changes in the skin. PMH/PSH: reviewed and updated  Sochx:  reports that she has never smoked. She has never used smokeless tobacco. She reports current alcohol use. She reports that she does not use drugs. Famhx: reviewed and updated     All: Allergies   Allergen Reactions    Pcn [Penicillins] Rash, Swelling and Unknown (comments)    Sulfa (Sulfonamide Antibiotics) Unknown (comments)     Med:   Current Outpatient Medications   Medication Sig    TRUE METRIX GLUCOSE TEST STRIP strip USE TO CHECK BLOOD SUGAR ONE TIME DAILY    PARoxetine (PAXIL) 10 mg tablet TAKE 1 TABLET EVERY DAY    amLODIPine (NORVASC) 2.5 mg tablet Take 1 Tab by mouth daily.  metoprolol succinate (TOPROL-XL) 25 mg XL tablet Take 1 Tab by mouth daily.  lancets 28 gauge misc CHECK BLOOD SUGAR ONE TIME DAILY    Lactobacillus acidophilus (PROBIOTIC PO) Take  by mouth.  Blood-Glucose Meter monitoring kit To measure blood sugar and average 1 time per day. Pleas provide lowest cost brand. Diagnosis code E11.9    glucose blood VI test strips (ASCENSIA AUTODISC VI, ONE TOUCH ULTRA TEST VI) strip To measure blood sugar and average 1 time per day. Pleas provide lowest cost brand. Diagnosis code E11.9    cholecalciferol (VITAMIN D3) 400 unit tab tablet Take  by mouth daily.  MULTIVITAMIN PO Take  by mouth as directed.     aspirin 81 mg tablet Take 81 mg by mouth every other day.  CALCIUM PO Take 600 mg by mouth daily. No current facility-administered medications for this visit. ROS:   10 point review of systems negative except as noted in HPI or below:    PE: Blood pressure 143/73, pulse (!) 55, temperature 97.9 °F (36.6 °C), temperature source Oral, resp. rate 17, height 4' 10\" (1.473 m), weight 152 lb (68.9 kg), SpO2 96 %. Body mass index is 31.77 kg/m². Physical Exam  Vitals signs and nursing note reviewed. Constitutional:       General: She is not in acute distress. Appearance: Normal appearance. She is obese. Comments: Well groomed and calm. HENT:      Head: Normocephalic. Left Ear: Tympanic membrane normal.      Nose: Nose normal.      Mouth/Throat:      Mouth: Mucous membranes are moist.   Eyes:      Conjunctiva/sclera: Conjunctivae normal.   Neck:      Musculoskeletal: Neck supple. Cardiovascular:      Rate and Rhythm: Normal rate and regular rhythm. Heart sounds: Normal heart sounds. Pulmonary:      Effort: Pulmonary effort is normal.      Breath sounds: Normal breath sounds. Abdominal:      General: Abdomen is flat. Bowel sounds are normal. There is no distension. Musculoskeletal: Normal range of motion. General: No swelling. Skin:     General: Skin is warm and dry. Capillary Refill: Capillary refill takes less than 2 seconds. Neurological:      Mental Status: She is alert and oriented to person, place, and time. Psychiatric:         Mood and Affect: Mood normal.         Behavior: Behavior normal.         Thought Content:  Thought content normal.       Diabetic foot exam:     Left: Filament test 6/6   Pulse DP: 2+ (normal)   Pulse PT: 2+ (normal)   Deformities: Mild - lateral 3 toes are deforming to medial side , increased callous  Right: Filament test 6/6   Pulse DP: 2+ (normal)   Pulse PT: 2+ (normal)   Deformities: None    Labs:   Results for orders placed or performed in visit on 12/03/19   AMB POC HEMOGLOBIN A1C   Result Value Ref Range    Hemoglobin A1c (POC) 6.6 %   AMB POC URINE, MICROALBUMIN, SEMIQUANT (3 RESULTS)   Result Value Ref Range    ALBUMIN, URINE POC 10 Negative mg/L    CREATININE, URINE  mg/dL    Microalbumin/creat ratio (POC) <30 <30 MG/G       A/P:  75 y.o. female    ICD-10-CM ICD-9-CM    1. Diabetes mellitus type 2, diet-controlled (HCC) E11.9 250.00 AMB POC HEMOGLOBIN A1C      AMB POC URINE, MICROALBUMIN, SEMIQUANT (3 RESULTS)   2. Essential hypertension I10 401.9 AMB POC URINE, MICROALBUMIN, SEMIQUANT (3 RESULTS)   3. 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 Polyp of colon, unspecified part of colon, unspecified type K63.5 211.3    5. Elevated TSH R79.89 794.5    6. Vitamin D deficiency E55.9 268.9    7. Osteopenia, unspecified location M85.80 733.90    8. Class 1 obesity due to excess calories without serious comorbidity with body mass index (BMI) of 31.0 to 31.9 in adult E66.09 278.00     Z68.31 V85.31      Bronchogenic cyst: seen incidentally on cxr, then ct lung. Dsicussed in march. Patient followed up with Dr. Anibal Corbin. Will be getting imaging later in 2020 to determine stability of lesion and further prove this was a congenital finding. HTN: BP overall a little above goal. discused in creasing amlodipine, patient will defer to cardiologist visit later this week. Obesity: weight up compared to prior by ~ 5 pounds. Discussed considering how to make exercise more a part of her routine. Arthritis: again discussed need for regular movement and stretching to improve overall mobility and joint health.      History of mild TSH elevation on last labs, repeat level normal in march 2019     Diabetes: historically well controlled, with diet alone. Not interested in adding metformin. Monitoring labs requested today   - eye: [facility]. No new complications.  May be getting catract surgery in the futre.    - foot exam: normal today   - influenza vaccine,done   - march bmp shows good kidney function   - microalbumin in normal range. Fecal incontinence: improved with metamucil, following with Dr. Antonio Dunn. GI    RAMANDEEP: minimally reviewed today, continues on paxil. Denies any active symptoms of depression/anxiety. - She was given AVS and expressed understanding with the diagnosis and plan as discussed. Future Appointments   Date Time Provider Dre Lu   12/5/2019 10:20 AM Melvinia Bumpers,  [address]           This is the Subsequent Medicare Annual Wellness Exam, performed 12 months or more after the Initial AWV or the last Subsequent AWV    I have reviewed the patient's medical history in detail and updated the computerized patient record.      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    Class 1 obesity due to excess calories without serious comorbidity in adult E66.09     Past Medical History:   Diagnosis Date    Anxiety     began with menopause    Basal cell carcinoma of skin of face     Bronchitis 03/09/2019    Pt [address]st [address]    Colon polyps     Dr Quinton Gilmore, January 2015 normal colonoscopy, need  year follow up    Diabetes mellitus type 2, diet-controlled ([facility] [address] 9/1/2015    Family history of skin cancer     sister- melanoma; son- several BCC    Hypercholesterolemia     Hypertension     IGT (impaired glucose tolerance)     pre diabetic    Osteopenia july 2015    PVC (premature ventricular contraction) February 2016    Dr Filippo Florence Skin cancer March 2013    BCC, left cheek, Dr. Rios/Selina [id number] evaluation    Sun-damaged skin     As a child and teen pt had bad sunburns    Sunburn, blistering       Past Surgical History:   Procedure Laterality Date    ENDOSCOPY, COLON, DIAGNOSTIC  12/09    5 years    HX CARPAL TUNNEL RELEASE      bilat    HX POLYPECTOMY  2000,2005    colon     Current Outpatient Medications   Medication Sig Dispense Refill    TRUE METRIX GLUCOSE TEST STRIP strip USE TO CHECK BLOOD SUGAR ONE TIME DAILY 100 Strip 3    PARoxetine (PAXIL) 10 mg tablet TAKE 1 TABLET EVERY DAY 90 Tab 0    amLODIPine (NORVASC) 2.5 mg tablet Take 1 Tab by mouth daily. 90 Tab 3    metoprolol succinate (TOPROL-XL) 25 mg XL tablet Take 1 Tab by mouth daily. 90 Tab 2    lancets 28 gauge misc CHECK BLOOD SUGAR ONE TIME DAILY 100 Lancet 3    Lactobacillus acidophilus (PROBIOTIC PO) Take  by mouth.  Blood-Glucose Meter monitoring kit To measure blood sugar and average 1 time per day. Pleas provide lowest cost brand. Diagnosis code E11.9 1 Kit 0    glucose blood VI test strips (ASCENSIA AUTODISC VI, ONE TOUCH ULTRA TEST VI) strip To measure blood sugar and average 1 time per day. Pleas provide lowest cost brand. Diagnosis code E11.9 100 Strip 4    cholecalciferol (VITAMIN D3) 400 unit tab tablet Take  by mouth daily.  MULTIVITAMIN PO Take  by mouth as directed.  aspirin 81 mg tablet Take 81 mg by mouth every other day.  CALCIUM PO Take 600 mg by mouth daily.        Allergies   Allergen Reactions    Pcn [Penicillins] Rash, Swelling and Unknown (comments)    Sulfa (Sulfonamide Antibiotics) Unknown (comments)       Family History   Problem Relation Age of Onset    Cancer Mother 39        uterine   Sophronia.People Stroke Mother     Dementia Mother     Other Father         Parkinsons    Crohn's Disease Daughter     Dementia Maternal Aunt         all 5 mat aunts    Colon Cancer Maternal Grandmother     Colon Cancer Paternal Grandfather      Social History     Tobacco Use    Smoking status: Never Smoker    Smokeless tobacco: Never Used Substance Use Topics    Alcohol use: Yes     Alcohol/week: 0.0 - 1.0 standard drinks     Comment: occasional       Depression Risk Factor Screening:     3 most recent PHQ Screens 12/3/2019   Little interest or pleasure in doing things Not at all   Feeling down, depressed, irritable, or hopeless Not at all   Total Score PHQ 2 0     Alcohol Risk Factor Screening:   Do you average 1 drink per night or more than 7 drinks a week:  no    On any one occasion in the past three months have you have had more than 3 drinks containing alcohol:  No    Functional Ability and Level of Safety:   Hearing: Hearing is good. Activities of Daily Living: The home contains: no safety equipment. Patient does total self care    Ambulation: with mild difficulty and due to joint pain. Fall Risk:  Fall Risk Assessment, last 12 mths 12/3/2019   Able to walk? Yes   Fall in past 12 months? No   Fall with injury? -   Number of falls in past 12 months -   Fall Risk Score -     Abuse Screen:  Patient is not abused    Cognitive Screening   Has your family/caregiver stated any concerns about your memory: no  Cognitive Screening: Normal - 6 cit    Patient Care Team   Patient Care Team:  Danny Baum MD as PCP - General (Internal Medicine)  Danny Baum MD as PCP - REHABILITATION [facility] Empaneled Provider  Maria L Mascorro MD (Dermatology)  Ronit Parker MD (Cardiology)  Kalyan Anne MD (Ophthalmology)    Assessment/Plan   Education and counseling provided:  Are appropriate based on today's review and evaluation    Diagnoses and all orders for this visit:    1. Diabetes mellitus type 2, diet-controlled (HCC)  -     AMB POC HEMOGLOBIN A1C  -     AMB POC URINE, MICROALBUMIN, SEMIQUANT (3 RESULTS)    2. Essential hypertension  -     AMB POC URINE, MICROALBUMIN, SEMIQUANT (3 RESULTS)    3. Congenital bronchogenic cyst    4. Polyp of colon, unspecified part of colon, unspecified type    5.  Elevated TSH    6. Vitamin D deficiency    7. Osteopenia, unspecified location    8. Class 1 obesity due to excess calories without serious comorbidity with body mass index (BMI) of 31.0 to 31.9 in adult    9. Medicare annual wellness visit, subsequent    Well woman (non-gyn) exam: history and exam revealed issues as noted below.    Cancer screening:    - colon: last c-scope in 12/2009.    - breast:    -   Vaccine status: up to date   Cardiovascular risk: BP well controlled, lipid screen due in march  Bone health: daily vit D supplement   Diet and Exercise: not drinking sugary beverages       Health Maintenance Due   Topic Date Due    Shingrix Vaccine Age 50> (1 of 2) 11/27/1994    GLAUCOMA SCREENING Q2Y  09/29/2017    EYE EXAM RETINAL OR DILATED  09/29/2017    HEMOGLOBIN A1C Q6M  09/13/2019    MICROALBUMIN Q1  09/17/2019    MEDICARE YEARLY EXAM  09/18/2019    FOOT EXAM Q1  09/18/2019    COLONOSCOPY  01/16/2020

## 2019-12-03 NOTE — PROGRESS NOTES
RM 1    Chief Complaint   Patient presents with    Follow-up     diabetes      1. Have you been to the ER, urgent care clinic since your last visit? Hospitalized since your last visit? No    2. Have you seen or consulted any other health care providers outside of the [address] [place] [address] since your last visit? Include any pap smears or colon screening.  Pulmonary doctor for lung nodule, eye doctor - Dr. Fred Allen Maintenance Due   Topic Date Due    Shingrix Vaccine Age 50> (1 of 2) 11/27/1994    GLAUCOMA SCREENING Q2Y  09/29/2017    EYE EXAM RETINAL OR DILATED  09/29/2017    HEMOGLOBIN A1C Q6M  09/13/2019    MICROALBUMIN Q1  09/17/2019    MEDICARE YEARLY EXAM  09/18/2019    FOOT EXAM Q1  09/18/2019    COLONOSCOPY  01/16/2020       Learning Assessment 3/31/2014   PRIMARY LEARNER Patient   HIGHEST LEVEL OF EDUCATION - PRIMARY LEARNER  4 YEARS OF COLLEGE   BARRIERS PRIMARY LEARNER NONE   CO-LEARNER CAREGIVER No   PRIMARY LANGUAGE ENGLISH   LEARNER PREFERENCE PRIMARY READING     PICTURES   ANSWERED BY Juhi Fontenot   RELATIONSHIP SELF

## 2019-12-03 NOTE — PATIENT INSTRUCTIONS
Learning About Low Bone Density What is low bone density? Low bone density (sometimes called osteopenia) is a decrease in thickness, or density, in bones. That means the bones become thinner and weaker. It is much more common in women than in men. It is an early form of osteoporosis, a condition in which the bones are so thin and weak that they can break easily. It's important to know that low bone density is not a disease. It can happen normally with aging. Having low bone density means that there is a greater risk that you may get osteoporosis. It also means that you are more likely to break a bone than someone who does not have low bone density. But not everyone with low bone density gets osteoporosis or breaks a bone. Low bone density doesn't cause any symptoms. It's usually found with a type of X-ray called a bone density test. Low bone density means that your bone density result (T-score) is between 1.0 and 2.5. What increases your risk for low bone density? Things that increase your risk include: 
· Having a family history of osteoporosis. · Being thin. · Being white or . · Getting too little physical activity. · Smoking. · Drinking too much alcohol often. · Using certain medicines such as steroids. How can you prevent osteoporosis? There are things you can do to help prevent osteoporosis. Certain lifestyle changes will help slow the loss of bone density. · Eat food that has plenty of calcium and vitamin D. Yogurt, cheese, milk, and dark green vegetables are high in calcium. Eggs, fatty fish, cereal, and fortified milk are high in vitamin D. 
· Talk to your doctor about taking a calcium supplement that has vitamin D in it. · Get regular exercise. ? Do 30 minutes of weight-bearing exercise on most days of the week. Walking, jogging, stair climbing, and dancing are good choices. ? Do resistance exercises with weights or elastic bands 2 or 3 days a week. · Limit alcohol to 2 drinks a day for men and 1 drink a day for women. Too much alcohol can cause health problems. · Do not smoke. Smoking can make bones thin faster. If you need help quitting, talk to your doctor about stop-smoking programs and medicines. These can increase your chances of quitting for good. Prescription medicines are available for treating low bone density. But these are more often used to treat osteoporosis. Follow-up care is a key part of your treatment and safety. Be sure to make and go to all appointments, and call your doctor if you are having problems. It's also a good idea to know your test results and keep a list of the medicines you take. Where can you learn more? Go to http://thee-luis angel.info/. Enter V060 in the search box to learn more about \"Learning About Low Bone Density. \" Current as of: November 7, 2018 Content Version: 12.2 © 1070-3893 [employer]. Care instructions adapted under license by [employer] (which disclaims liability or warranty for this information). If you have questions about a medical condition or this instruction, always ask your healthcare professional. [address] any warranty or liability for your use of this information.

## 2019-12-05 ENCOUNTER — OFFICE VISIT (OUTPATIENT)
Dept: CARDIOLOGY CLINIC | Age: 74
End: 2019-12-05

## 2019-12-05 VITALS
DIASTOLIC BLOOD PRESSURE: 80 MMHG | OXYGEN SATURATION: 98 % | BODY MASS INDEX: 31.87 KG/M2 | RESPIRATION RATE: 16 BRPM | WEIGHT: 151.8 LBS | HEIGHT: 58 IN | HEART RATE: 64 BPM | SYSTOLIC BLOOD PRESSURE: 130 MMHG

## 2019-12-05 DIAGNOSIS — I49.8 VENTRICULAR TRIGEMINY: ICD-10-CM

## 2019-12-05 DIAGNOSIS — I47.1 SVT (SUPRAVENTRICULAR TACHYCARDIA) (HCC): ICD-10-CM

## 2019-12-05 DIAGNOSIS — I10 ESSENTIAL HYPERTENSION: Primary | ICD-10-CM

## 2019-12-05 DIAGNOSIS — I49.3 PVC (PREMATURE VENTRICULAR CONTRACTION): ICD-10-CM

## 2019-12-05 NOTE — PROGRESS NOTES
[facility] [place]:   [phone]    HPI: Krystal Harris, a 75y.o. year-old who presents for follow up regarding her PVCs, atrial tachycardia. Bp was up at PCP last week but looks great today. Running 120-150 at home. So I will not adjust her today. PAC on exam today. Sees Dr. Kimi Curiel next in April, Labs looked good, a1c down. She is feeling well, no palpitations, in NSR on ECG today  Still undergoing GI workup, getting colonoscopy in January   Only has dizziness every few months, mostly with movement of the head  Denies any syncope  No LE edema  No dyspnea with exertion or PND  No chest pain   Taking Toprol XL once daily   BP around 130/80 at home   Probably going to see orthopedics about her hip and knee pain soon  Bruises easily even taking ASA every other day   She has not exercised x 1 year due to orthopedic issues    Assessment/Plan:  1. Body mass index is 31.73 kg/m². encouraged exercise  2. PVCs, PACs, atrial tachycardia/SVT - well controlled on Toprol XL 25mg daily  -loop monitor in 8/18 without arrhythmias  -follow up in the office in 1 year   3. Dyspnea with exertion - none currently, no ischemia on stress test 8/18, echo ok 8/18   4. HTN- well controlled on amlodipine 2.5mg daily and Toprol XL 25mg daily    5. Dyslipidemia- , diet controlled for now, ca score of zero in 2016  6. Chest tightness/squeezing - none currently, no ischemia noted on nuclear stress test 8/18   7. Anxiety - on paxil    8. IGT - A1c 6.2%, encouraged exercise     Loop Monitor 8/18 - no arrhythmias  Echo 9/18 - LV mildly dilated, LVEF 63%, no WMA, LA mildly dilated.   Nuc Stress 8/18 - no ischemia, LVEF 64%  Coronary calcium scan 2016 - zero   Stress Echo 9/15 exe 7:00 anteroseptal and apical inferior hypokinesis,   Holter 9/15 PVC, PAC, atach 133    Soc no tob no etoh  Fhx cancer, CVA no early CAD    She  has a past medical history of Anxiety, Basal cell carcinoma of skin of face, Bronchitis (03/09/2019), Colon polyps, Diabetes mellitus type 2, diet-controlled ([facility] [address] (9/1/2015), Family history of skin cancer, Hypercholesterolemia, Hypertension, IGT (impaired glucose tolerance), Osteopenia (july 2015), PVC (premature ventricular contraction) (February 2016), Skin cancer (March 2013), Sun-damaged skin, and Sunburn, blistering. She also has no past medical history of Arsenic suspected exposure, Radiation exposure, or Tanning bed exposure. Cardiovascular ROS: negative for palpitations or chest pain    Respiratory ROS: negative for - cough or orthopnea  Neurological ROS: no TIA or stroke symptoms  All other systems negative except as above. PE  Vitals:    12/05/19 1030   BP: 130/80   Pulse: 64   Resp: 16   SpO2: 98%   Weight: 151 lb 12.8 oz (68.9 kg)   Height: 4' 10\" (1.473 m)    Body mass index is 31.73 kg/m².    General appearance - alert, well appearing, and in no distress  Mental status - affect appropriate to mood  Eyes - sclera anicteric, moist mucous membranes  Neck - supple, no carotid bruits   Lymphatics - not assessed   Chest - clear to auscultation, no wheezes, rales or rhonchi  Heart - regular rate and rhythm, normal S1, S2, no murmurs, rubs, clicks or gallops  Abdomen - soft, nontender, nondistended  Back exam - full range of motion, no tenderness  Neurological - cranial nerves II through XII grossly intact, no focal deficit  Musculoskeletal - no muscular tenderness noted, normal strength  Extremities - peripheral pulses normal, no pedal edema  Skin - normal coloration  no rashes    12 lead ECG: NSR     Recent Labs:  Lab Results   Component Value Date/Time    Cholesterol, total 169 03/13/2019 09:01 AM    HDL Cholesterol 71 03/13/2019 09:01 AM    LDL, calculated 74 03/13/2019 09:01 AM    Triglyceride 118 03/13/2019 09:01 AM     Lab Results   Component Value Date/Time    Creatinine 0.72 03/13/2019 09:01 AM     Lab Results   Component Value Date/Time    BUN 17 03/13/2019 09:01 AM     Lab Results Component Value Date/Time    Potassium 4.6 03/13/2019 09:01 AM     Lab Results   Component Value Date/Time    Hemoglobin A1c 7.1 (H) 03/13/2019 09:01 AM     Lab Results   Component Value Date/Time    HGB 14.4 03/13/2019 09:01 AM     Lab Results   Component Value Date/Time    PLATELET 624 76/41/7650 09:01 AM       Reviewed:  Past Medical History:   Diagnosis Date    Anxiety     began with menopause    Basal cell carcinoma of skin of face     Bronchitis 03/09/2019    Pt 1st [address] [place]    Colon polyps     Dr Villanueva Speaker, January 2015 normal colonoscopy, need  year follow up    Diabetes mellitus type 2, diet-controlled ([facility] [address] 9/1/2015    Family history of skin cancer     sister- melanoma; son- several BCC    Hypercholesterolemia     Hypertension     IGT (impaired glucose tolerance)     pre diabetic    Osteopenia july 2015    PVC (premature ventricular contraction) February 2016    Dr Adithya Mueller Skin cancer March 2013    BCC, left cheek, Dr. Rios/Selina [id number] evaluation    Sun-damaged skin     As a child and teen pt had bad sunburns    Sunburn, blistering      Social History     Tobacco Use   Smoking Status Never Smoker   Smokeless Tobacco Never Used     Social History     Substance and Sexual Activity   Alcohol Use Yes    Alcohol/week: 0.0 - 1.0 standard drinks    Frequency: Monthly or less    Drinks per session: 1 or 2    Comment: rare     Allergies   Allergen Reactions    Pcn [Penicillins] Rash, Swelling and Unknown (comments)    Sulfa (Sulfonamide Antibiotics) Unknown (comments)       Current Outpatient Medications   Medication Sig    TRUE METRIX GLUCOSE TEST STRIP strip USE TO CHECK BLOOD SUGAR ONE TIME DAILY    PARoxetine (PAXIL) 10 mg tablet TAKE 1 TABLET EVERY DAY    amLODIPine (NORVASC) 2.5 mg tablet Take 1 Tab by mouth daily.  metoprolol succinate (TOPROL-XL) 25 mg XL tablet Take 1 Tab by mouth daily.     lancets 28 gauge misc CHECK BLOOD SUGAR ONE TIME DAILY    Lactobacillus acidophilus (PROBIOTIC PO) Take  by mouth.  Blood-Glucose Meter monitoring kit To measure blood sugar and average 1 time per day. Pleas provide lowest cost brand. Diagnosis code E11.9    glucose blood VI test strips (ASCENSIA AUTODISC VI, ONE TOUCH ULTRA TEST VI) strip To measure blood sugar and average 1 time per day. Pleas provide lowest cost brand. Diagnosis code E11.9    cholecalciferol (VITAMIN D3) 400 unit tab tablet Take  by mouth daily.  MULTIVITAMIN PO Take  by mouth as directed.  aspirin 81 mg tablet Take 81 mg by mouth every other day.  CALCIUM PO Take 600 mg by mouth daily. No current facility-administered medications for this visit.         Jaida Pardo MD  [facility] heart and Vascular [place]  [address] [facility] [address]  [address]

## 2019-12-09 ENCOUNTER — HOSPITAL ENCOUNTER (OUTPATIENT)
Dept: MAMMOGRAPHY | Age: 74
Discharge: HOME | End: 2019-12-09
Attending: INTERNAL MEDICINE
Payer: MEDICARE

## 2019-12-09 DIAGNOSIS — Z12.31 ENCOUNTER FOR SCREENING MAMMOGRAM FOR MALIGNANT NEOPLASM OF BREAST: ICD-10-CM

## 2019-12-09 PROCEDURE — 77067 SCR MAMMO BI INCL CAD: CPT

## 2019-12-23 DIAGNOSIS — F41.1 GAD (GENERALIZED ANXIETY DISORDER): ICD-10-CM

## 2019-12-23 RX ORDER — PAROXETINE 10 MG/1
TABLET, FILM COATED ORAL
Qty: 90 TAB | Refills: 3 | Status: SHIPPED | OUTPATIENT
Start: 2019-12-23 | End: 2020-12-15

## 2020-04-10 RX ORDER — METOPROLOL SUCCINATE 25 MG/1
25 TABLET, EXTENDED RELEASE ORAL DAILY
Qty: 90 TAB | Refills: 2 | Status: SHIPPED | OUTPATIENT
Start: 2020-04-10 | End: 2020-10-06 | Stop reason: SDUPTHER

## 2020-04-10 NOTE — TELEPHONE ENCOUNTER
Patient is going to be out in a week. Please send today so that she can get it in the mail before she is out. Pharmacy confirmed.

## 2020-10-06 ENCOUNTER — OFFICE VISIT (OUTPATIENT)
Dept: CARDIOLOGY CLINIC | Age: 75
End: 2020-10-06
Payer: MEDICARE

## 2020-10-06 VITALS
WEIGHT: 153 LBS | HEIGHT: 58 IN | OXYGEN SATURATION: 97 % | DIASTOLIC BLOOD PRESSURE: 88 MMHG | BODY MASS INDEX: 32.12 KG/M2 | SYSTOLIC BLOOD PRESSURE: 148 MMHG | HEART RATE: 65 BPM

## 2020-10-06 DIAGNOSIS — I49.8 VENTRICULAR TRIGEMINY: Primary | ICD-10-CM

## 2020-10-06 DIAGNOSIS — I49.3 PVC (PREMATURE VENTRICULAR CONTRACTION): ICD-10-CM

## 2020-10-06 DIAGNOSIS — I10 ESSENTIAL HYPERTENSION: ICD-10-CM

## 2020-10-06 DIAGNOSIS — I47.1 ATRIAL TACHYCARDIA (HCC): ICD-10-CM

## 2020-10-06 PROCEDURE — G8753 SYS BP > OR = 140: HCPCS | Performed by: INTERNAL MEDICINE

## 2020-10-06 PROCEDURE — G8432 DEP SCR NOT DOC, RNG: HCPCS | Performed by: INTERNAL MEDICINE

## 2020-10-06 PROCEDURE — G0463 HOSPITAL OUTPT CLINIC VISIT: HCPCS | Performed by: INTERNAL MEDICINE

## 2020-10-06 PROCEDURE — G8427 DOCREV CUR MEDS BY ELIG CLIN: HCPCS | Performed by: INTERNAL MEDICINE

## 2020-10-06 PROCEDURE — 1090F PRES/ABSN URINE INCON ASSESS: CPT | Performed by: INTERNAL MEDICINE

## 2020-10-06 PROCEDURE — G8754 DIAS BP LESS 90: HCPCS | Performed by: INTERNAL MEDICINE

## 2020-10-06 PROCEDURE — 99214 OFFICE O/P EST MOD 30 MIN: CPT | Performed by: INTERNAL MEDICINE

## 2020-10-06 PROCEDURE — G8399 PT W/DXA RESULTS DOCUMENT: HCPCS | Performed by: INTERNAL MEDICINE

## 2020-10-06 PROCEDURE — G8536 NO DOC ELDER MAL SCRN: HCPCS | Performed by: INTERNAL MEDICINE

## 2020-10-06 PROCEDURE — 1101F PT FALLS ASSESS-DOCD LE1/YR: CPT | Performed by: INTERNAL MEDICINE

## 2020-10-06 PROCEDURE — G8417 CALC BMI ABV UP PARAM F/U: HCPCS | Performed by: INTERNAL MEDICINE

## 2020-10-06 PROCEDURE — 3017F COLORECTAL CA SCREEN DOC REV: CPT | Performed by: INTERNAL MEDICINE

## 2020-10-06 RX ORDER — METOPROLOL SUCCINATE 25 MG/1
25 TABLET, EXTENDED RELEASE ORAL DAILY
Qty: 90 TAB | Refills: 2 | Status: SHIPPED | OUTPATIENT
Start: 2020-10-06 | End: 2020-10-27 | Stop reason: SDUPTHER

## 2020-10-06 RX ORDER — AMLODIPINE BESYLATE 2.5 MG/1
2.5 TABLET ORAL DAILY
Qty: 90 TAB | Refills: 1 | Status: SHIPPED | OUTPATIENT
Start: 2020-10-06 | End: 2020-10-27 | Stop reason: SDUPTHER

## 2020-10-06 NOTE — PROGRESS NOTES
[facility] [place]:   [phone]    HPI: Mic Almeida, a 75y.o. year-old who presents for follow up regarding her PVCs, atrial tachycardia. Bp was up at PCP last week but looks great today. Running 120-150 at home. So I will not adjust her meds today. PAC on exam today. Sees Dr. Perfecto Kiran next in April, Labs looked good, a1c down. She is feeling well, no palpitations, in NSR on ECG rare dizziness these days denies any syncope  No LE edema  No dyspnea with exertion or PND  No chest pain   Taking Toprol XL once daily   BP around 130/80 at home      Bruises easily even taking ASA every other day   Her exercise continues to be limited by orthopedic issues but she does the best she can and overall she is doing pretty well    Assessment/Plan:  1. Body mass index is 31.98 kg/m². encouraged exercise  2. PVCs, PACs, atrial tachycardia/SVT - well controlled on Toprol XL 25mg daily  -loop monitor in 8/18 without arrhythmias  -follow up in the office in 1 year   3. Dyspnea with exertion - none currently, no ischemia on stress test 8/18, echo ok 8/18   4. HTN- well controlled on amlodipine 2.5mg daily and Toprol XL 25mg daily    5. Dyslipidemia- , diet controlled for now, ca score of zero in 2016  6. Chest tightness/squeezing - none currently, no ischemia noted on nuclear stress test 8/18   7. Anxiety - on paxil    8. IGT - A1c 6.2%, encouraged exercise     Loop Monitor 8/18 - no arrhythmias  Echo 9/18 - LV mildly dilated, LVEF 63%, no WMA, LA mildly dilated.   Nuc Stress 8/18 - no ischemia, LVEF 64%  Coronary calcium scan 2016 - zero   Stress Echo 9/15 exe 7:00 anteroseptal and apical inferior hypokinesis, AXEL  Holter 9/15 PVC, PAC, atach 133    Soc no tob no etoh  Fhx cancer, CVA no early CAD    She  has a past medical history of Anxiety, Basal cell carcinoma of skin of face, Bronchitis (03/09/2019), Colon polyps, Diabetes mellitus type 2, diet-controlled ([facility] [address] (9/1/2015), Family history of skin cancer, Hypercholesterolemia, Hypertension, IGT (impaired glucose tolerance), Osteopenia (july 2015), PVC (premature ventricular contraction) (February 2016), Skin cancer (March 2013), Sun-damaged skin, and Sunburn, blistering. She also has no past medical history of Arsenic suspected exposure, Radiation exposure, or Tanning bed exposure. Cardiovascular ROS: negative for palpitations or chest pain    Respiratory ROS: negative for - cough or orthopnea  Neurological ROS: no TIA or stroke symptoms  All other systems negative except as above. PE  Vitals:    10/06/20 1031   BP: (!) 148/88   Pulse: 65   SpO2: 97%   Weight: 153 lb (69.4 kg)   Height: 4' 10\" (1.473 m)    Body mass index is 31.98 kg/m².    General appearance - alert, well appearing, and in no distress  Mental status - affect appropriate to mood  Eyes - sclera anicteric, moist mucous membranes  Neck - supple, no carotid bruits   Lymphatics - not assessed   Chest - clear to auscultation, no wheezes, rales or rhonchi  Heart - regular rate and rhythm, normal S1, S2, no murmurs, rubs, clicks or gallops  Abdomen - soft, nontender, nondistended  Back exam - full range of motion, no tenderness  Neurological - cranial nerves II through XII grossly intact, no focal deficit  Musculoskeletal - no muscular tenderness noted, normal strength  Extremities - peripheral pulses normal, no pedal edema  Skin - normal coloration  no rashes    12 lead ECG: NSR     Recent Labs:  Lab Results   Component Value Date/Time    Cholesterol, total 169 03/13/2019 09:01 AM    HDL Cholesterol 71 03/13/2019 09:01 AM    LDL, calculated 74 03/13/2019 09:01 AM    Triglyceride 118 03/13/2019 09:01 AM     Lab Results   Component Value Date/Time    Creatinine 0.72 03/13/2019 09:01 AM     Lab Results   Component Value Date/Time    BUN 17 03/13/2019 09:01 AM     Lab Results   Component Value Date/Time    Potassium 4.6 03/13/2019 09:01 AM     Lab Results   Component Value Date/Time    Hemoglobin A1c 7.1 (H) 03/13/2019 09:01 AM     Lab Results   Component Value Date/Time    HGB 14.4 03/13/2019 09:01 AM     Lab Results   Component Value Date/Time    PLATELET 274 03/35/7268 09:01 AM       Reviewed:  Past Medical History:   Diagnosis Date    Anxiety     began with menopause    Basal cell carcinoma of skin of face     Bronchitis 03/09/2019    Pt 1st [address] [place] polyps     Dr Lopez Mallory, January 2015 normal colonoscopy, need  year follow up    Diabetes mellitus type 2, diet-controlled ([facility] [address] 9/1/2015    Family history of skin cancer     sister- melanoma; son- several [employer]    Hypercholesterolemia     Hypertension     IGT (impaired glucose tolerance)     pre diabetic    Osteopenia july 2015    PVC (premature ventricular contraction) February 2016    Dr Jannette Roblero Skin cancer March 2013    BCC, left cheek, Dr. Rios/Selina [id number] evaluation    Sun-damaged skin     As a child and teen pt had bad sunburns    Sunburn, blistering      Social History     Tobacco Use   Smoking Status Never Smoker   Smokeless Tobacco Never Used     Social History     Substance and Sexual Activity   Alcohol Use Yes    Alcohol/week: 0.0 - 1.0 standard drinks    Frequency: Monthly or less    Drinks per session: 1 or 2    Comment: rare     Allergies   Allergen Reactions    Pcn [Penicillins] Rash, Swelling and Unknown (comments)    Sulfa (Sulfonamide Antibiotics) Unknown (comments)       Current Outpatient Medications   Medication Sig    amLODIPine (NORVASC) 2.5 mg tablet TAKE ONE TABLET BY MOUTH ONE TIME DAILY    metoprolol succinate (TOPROL-XL) 25 mg XL tablet Take 1 Tab by mouth daily.  PARoxetine (PAXIL) 10 mg tablet TAKE 1 TABLET EVERY DAY    TRUE METRIX GLUCOSE TEST STRIP strip USE TO CHECK BLOOD SUGAR ONE TIME DAILY    lancets 28 gauge misc CHECK BLOOD SUGAR ONE TIME DAILY    Lactobacillus acidophilus (PROBIOTIC PO) Take  by mouth.     Blood-Glucose Meter monitoring kit To measure blood sugar and average 1 time per day. Pleas provide lowest cost brand. Diagnosis code E11.9    glucose blood VI test strips (ASCENSIA AUTODISC VI, ONE TOUCH ULTRA TEST VI) strip To measure blood sugar and average 1 time per day. Pleas provide lowest cost brand. Diagnosis code E11.9    cholecalciferol (VITAMIN D3) 400 unit tab tablet Take  by mouth daily.  MULTIVITAMIN PO Take  by mouth as directed.  aspirin 81 mg tablet Take 81 mg by mouth every other day.  CALCIUM PO Take 600 mg by mouth daily. No current facility-administered medications for this visit.         Shelby Hurt MD  [facility] heart and Vascular [place]  [address] [facility] [address]  [facility], [address]

## 2020-10-27 ENCOUNTER — TELEPHONE (OUTPATIENT)
Dept: CARDIOLOGY CLINIC | Age: 75
End: 2020-10-27

## 2020-10-27 DIAGNOSIS — I10 ESSENTIAL HYPERTENSION: ICD-10-CM

## 2020-10-27 RX ORDER — AMLODIPINE BESYLATE 2.5 MG/1
2.5 TABLET ORAL DAILY
Qty: 90 TAB | Refills: 1 | Status: SHIPPED | OUTPATIENT
Start: 2020-10-27 | End: 2020-10-27 | Stop reason: SDUPTHER

## 2020-10-27 RX ORDER — AMLODIPINE BESYLATE 2.5 MG/1
2.5 TABLET ORAL DAILY
Qty: 90 TAB | Refills: 1 | Status: SHIPPED | OUTPATIENT
Start: 2020-10-27 | End: 2021-07-20 | Stop reason: SDUPTHER

## 2020-10-27 RX ORDER — METOPROLOL SUCCINATE 25 MG/1
25 TABLET, EXTENDED RELEASE ORAL DAILY
Qty: 90 TAB | Refills: 2 | Status: SHIPPED | OUTPATIENT
Start: 2020-10-27 | End: 2020-10-27 | Stop reason: SDUPTHER

## 2020-10-27 RX ORDER — METOPROLOL SUCCINATE 25 MG/1
25 TABLET, EXTENDED RELEASE ORAL DAILY
Qty: 90 TAB | Refills: 2 | Status: SHIPPED | OUTPATIENT
Start: 2020-10-27 | End: 2021-09-07

## 2020-10-27 NOTE — TELEPHONE ENCOUNTER
Requested Prescriptions     Signed Prescriptions Disp Refills    amLODIPine (NORVASC) 2.5 mg tablet 90 Tab 1     Sig: Take 1 Tab by mouth daily. Authorizing Provider: Jacque Multani     Ordering User: Patrick Bucio metoprolol succinate (TOPROL-XL) 25 mg XL tablet 90 Tab 2     Sig: Take 1 Tab by mouth daily.      Authorizing Provider: Jacque Multani     Ordering User: Steven Rinaldi     Per verbal orders

## 2020-10-27 NOTE — TELEPHONE ENCOUNTER
Patient calling stating that she would like her rx from Dr. Jocy Fountain to be sent to [facility] pharmacy on [id number]. Please advise.

## 2020-12-07 ENCOUNTER — OFFICE VISIT (OUTPATIENT)
Dept: INTERNAL MEDICINE CLINIC | Age: 75
End: 2020-12-07
Payer: MEDICARE

## 2020-12-07 VITALS
DIASTOLIC BLOOD PRESSURE: 73 MMHG | SYSTOLIC BLOOD PRESSURE: 144 MMHG | OXYGEN SATURATION: 94 % | TEMPERATURE: 97.7 F | BODY MASS INDEX: 31.75 KG/M2 | HEART RATE: 56 BPM | WEIGHT: 151.25 LBS | HEIGHT: 58 IN | RESPIRATION RATE: 16 BRPM

## 2020-12-07 DIAGNOSIS — Z12.31 ENCOUNTER FOR SCREENING MAMMOGRAM FOR MALIGNANT NEOPLASM OF BREAST: ICD-10-CM

## 2020-12-07 DIAGNOSIS — E66.09 CLASS 1 OBESITY DUE TO EXCESS CALORIES WITHOUT SERIOUS COMORBIDITY WITH BODY MASS INDEX (BMI) OF 31.0 TO 31.9 IN ADULT: ICD-10-CM

## 2020-12-07 DIAGNOSIS — Z00.00 MEDICARE ANNUAL WELLNESS VISIT, SUBSEQUENT: Primary | ICD-10-CM

## 2020-12-07 DIAGNOSIS — E11.9 DIABETES MELLITUS TYPE 2, DIET-CONTROLLED (HCC): ICD-10-CM

## 2020-12-07 DIAGNOSIS — I10 ESSENTIAL HYPERTENSION: ICD-10-CM

## 2020-12-07 DIAGNOSIS — R79.89 ELEVATED TSH: ICD-10-CM

## 2020-12-07 DIAGNOSIS — F41.9 ANXIETY: ICD-10-CM

## 2020-12-07 DIAGNOSIS — K63.5 POLYP OF COLON, UNSPECIFIED PART OF COLON, UNSPECIFIED TYPE: ICD-10-CM

## 2020-12-07 LAB
ALBUMIN SERPL-MCNC: 3.7 G/DL (ref 3.5–5)
ALBUMIN/GLOB SERPL: 1.2 {RATIO} (ref 1.1–2.2)
ALP SERPL-CCNC: 86 U/L (ref 45–117)
ALT SERPL-CCNC: 21 U/L (ref 12–78)
ANION GAP SERPL CALC-SCNC: 6 MMOL/L (ref 5–15)
AST SERPL-CCNC: 15 U/L (ref 15–37)
BASOPHILS # BLD: 0.1 K/UL (ref 0–0.1)
BASOPHILS NFR BLD: 1 % (ref 0–1)
BILIRUB SERPL-MCNC: 0.4 MG/DL (ref 0.2–1)
BUN SERPL-MCNC: 21 MG/DL (ref 6–20)
BUN/CREAT SERPL: 27 (ref 12–20)
CALCIUM SERPL-MCNC: 9.5 MG/DL (ref 8.5–10.1)
CHLORIDE SERPL-SCNC: 102 MMOL/L (ref 97–108)
CHOLEST SERPL-MCNC: 228 MG/DL
CO2 SERPL-SCNC: 30 MMOL/L (ref 21–32)
CREAT SERPL-MCNC: 0.77 MG/DL (ref 0.55–1.02)
DIFFERENTIAL METHOD BLD: ABNORMAL
EOSINOPHIL # BLD: 0.3 K/UL (ref 0–0.4)
EOSINOPHIL NFR BLD: 4 % (ref 0–7)
ERYTHROCYTE [DISTWIDTH] IN BLOOD BY AUTOMATED COUNT: 13.2 % (ref 11.5–14.5)
GLOBULIN SER CALC-MCNC: 3.2 G/DL (ref 2–4)
GLUCOSE SERPL-MCNC: 143 MG/DL (ref 65–100)
HBA1C MFR BLD HPLC: 6.5 %
HCT VFR BLD AUTO: 48 % (ref 35–47)
HDLC SERPL-MCNC: 86 MG/DL
HDLC SERPL: 2.7 {RATIO} (ref 0–5)
HGB BLD-MCNC: 15.1 G/DL (ref 11.5–16)
IMM GRANULOCYTES # BLD AUTO: 0 K/UL (ref 0–0.04)
IMM GRANULOCYTES NFR BLD AUTO: 0 % (ref 0–0.5)
LDLC SERPL CALC-MCNC: 99.6 MG/DL (ref 0–100)
LIPID PROFILE,FLP: ABNORMAL
LYMPHOCYTES # BLD: 1.4 K/UL (ref 0.8–3.5)
LYMPHOCYTES NFR BLD: 20 % (ref 12–49)
MCH RBC QN AUTO: 30.8 PG (ref 26–34)
MCHC RBC AUTO-ENTMCNC: 31.5 G/DL (ref 30–36.5)
MCV RBC AUTO: 98 FL (ref 80–99)
MONOCYTES # BLD: 0.6 K/UL (ref 0–1)
MONOCYTES NFR BLD: 8 % (ref 5–13)
NEUTS SEG # BLD: 4.5 K/UL (ref 1.8–8)
NEUTS SEG NFR BLD: 67 % (ref 32–75)
NRBC # BLD: 0 K/UL (ref 0–0.01)
NRBC BLD-RTO: 0 PER 100 WBC
PLATELET # BLD AUTO: 337 K/UL (ref 150–400)
PMV BLD AUTO: 9.9 FL (ref 8.9–12.9)
POTASSIUM SERPL-SCNC: 4.4 MMOL/L (ref 3.5–5.1)
PROT SERPL-MCNC: 6.9 G/DL (ref 6.4–8.2)
RBC # BLD AUTO: 4.9 M/UL (ref 3.8–5.2)
SODIUM SERPL-SCNC: 138 MMOL/L (ref 136–145)
TRIGL SERPL-MCNC: 212 MG/DL (ref ?–150)
TSH SERPL DL<=0.05 MIU/L-ACNC: 1.67 UIU/ML (ref 0.36–3.74)
VLDLC SERPL CALC-MCNC: 42.4 MG/DL
WBC # BLD AUTO: 6.8 K/UL (ref 3.6–11)

## 2020-12-07 PROCEDURE — G8432 DEP SCR NOT DOC, RNG: HCPCS | Performed by: INTERNAL MEDICINE

## 2020-12-07 PROCEDURE — 1100F PTFALLS ASSESS-DOCD GE2>/YR: CPT | Performed by: INTERNAL MEDICINE

## 2020-12-07 PROCEDURE — G0463 HOSPITAL OUTPT CLINIC VISIT: HCPCS | Performed by: INTERNAL MEDICINE

## 2020-12-07 PROCEDURE — 83036 HEMOGLOBIN GLYCOSYLATED A1C: CPT | Performed by: INTERNAL MEDICINE

## 2020-12-07 PROCEDURE — G8427 DOCREV CUR MEDS BY ELIG CLIN: HCPCS | Performed by: INTERNAL MEDICINE

## 2020-12-07 PROCEDURE — G8754 DIAS BP LESS 90: HCPCS | Performed by: INTERNAL MEDICINE

## 2020-12-07 PROCEDURE — 1090F PRES/ABSN URINE INCON ASSESS: CPT | Performed by: INTERNAL MEDICINE

## 2020-12-07 PROCEDURE — 3288F FALL RISK ASSESSMENT DOCD: CPT | Performed by: INTERNAL MEDICINE

## 2020-12-07 PROCEDURE — G0439 PPPS, SUBSEQ VISIT: HCPCS | Performed by: INTERNAL MEDICINE

## 2020-12-07 PROCEDURE — 99213 OFFICE O/P EST LOW 20 MIN: CPT | Performed by: INTERNAL MEDICINE

## 2020-12-07 PROCEDURE — G8417 CALC BMI ABV UP PARAM F/U: HCPCS | Performed by: INTERNAL MEDICINE

## 2020-12-07 PROCEDURE — G8399 PT W/DXA RESULTS DOCUMENT: HCPCS | Performed by: INTERNAL MEDICINE

## 2020-12-07 PROCEDURE — G8536 NO DOC ELDER MAL SCRN: HCPCS | Performed by: INTERNAL MEDICINE

## 2020-12-07 PROCEDURE — G8753 SYS BP > OR = 140: HCPCS | Performed by: INTERNAL MEDICINE

## 2020-12-07 NOTE — PATIENT INSTRUCTIONS
1) try to think of a way to get moving, in the house, or by taking a walk with a \"rodger\". 2) If mood is staying low, please reach out, I want to be a support for your in this, and a change in medication or dose change may also be helpful    Medicare Wellness Visit, Female     The best way to live healthy is to have a lifestyle where you eat a well-balanced diet, exercise regularly, limit alcohol use, and quit all forms of tobacco/nicotine, if applicable. Regular preventive services are another way to keep healthy. Preventive services (vaccines, screening tests, monitoring & exams) can help personalize your care plan, which helps you manage your own care. Screening tests can find health problems at the earliest stages, when they are easiest to treat. [facility] follows the current, evidence-based guidelines published by the [facility] [address] ([facility]) when recommending preventive services for our patients. Because we follow these guidelines, sometimes recommendations change over time as research supports it. (For example, mammograms used to be recommended annually. Even though Medicare will still pay for an annual mammogram, the newer guidelines recommend a mammogram every two years for women of average risk). Of course, you and your doctor may decide to screen more often for some diseases, based on your risk and your co-morbidities (chronic disease you are already diagnosed with). Preventive services for you include:  - Medicare offers their members a free annual wellness visit, which is time for you and your primary care provider to discuss and plan for your preventive service needs. Take advantage of this benefit every year!  -All adults over the age of 72 should receive the recommended pneumonia vaccines.  Current USPSTF guidelines recommend a series of two vaccines for the best pneumonia protection.   -All adults should have a flu vaccine yearly and a tetanus vaccine every 10 years.   -All adults age 48 and older should receive the shingles vaccines (series of two vaccines). -All adults age 38-68 who are overweight should have a diabetes screening test once every three years.   -All adults born between [birth year] and 1966 should be screened once for Hepatitis C.  -Other screening tests and preventive services for persons with diabetes include: an eye exam to screen for diabetic retinopathy, a kidney function test, a foot exam, and stricter control over your cholesterol.   -Cardiovascular screening for adults with routine risk involves an electrocardiogram (ECG) at intervals determined by your doctor.   -Colorectal cancer screenings should be done for adults age 54-65 with no increased risk factors for colorectal cancer. There are a number of acceptable methods of screening for this type of cancer. Each test has its own benefits and drawbacks. Discuss with your doctor what is most appropriate for you during your annual wellness visit. The different tests include: colonoscopy (considered the best screening method), a fecal occult blood test, a fecal DNA test, and sigmoidoscopy.    -A bone mass density test is recommended when a woman turns 65 to screen for osteoporosis. This test is only recommended one time, as a screening. Some providers will use this same test as a disease monitoring tool if you already have osteoporosis. -Breast cancer screenings are recommended every other year for women of normal risk, age 54-69.  -Cervical cancer screenings for women over age 72 are only recommended with certain risk factors. Here is a list of your current Health Maintenance items (your personalized list of preventive services) with a due date:  Health Maintenance Due   Topic Date Due    Glaucoma Screening   09/29/2017    Eye Exam  09/29/2017    Cholesterol Test   03/13/2020    Albumin Urine Test  12/03/2020    - urine and cholesterol test ordered today. Depression and Chronic Disease: Care Instructions  Your Care Instructions     A chronic disease is one that you have for a long time. Some chronic diseases can be controlled, but they usually cannot be cured. Depression is common in people with chronic diseases, but it often goes unnoticed. Many people have concerns about seeking treatment for a mental health problem. You may think it's a sign of weakness, or you don't want people to know about it. It's important to overcome these reasons for not seeking treatment. Treating depression or anxiety is good for your health. Follow-up care is a key part of your treatment and safety. Be sure to make and go to all appointments, and call your doctor if you are having problems. It's also a good idea to know your test results and keep a list of the medicines you take. How can you care for yourself at home? Watch for symptoms of depression  The symptoms of depression are often subtle at first. You may think they are caused by your disease rather than depression. Or you may think it is normal to be depressed when you have a chronic disease. If you are depressed you may:  · Feel sad or hopeless. · Feel guilty or worthless. · Not enjoy the things you used to enjoy. · Feel hopeless, as though life is not worth living. · Have trouble thinking or remembering. · Have low energy, and you may not eat or sleep well. · Pull away from others. · Think often about death or killing yourself. (Keep the numbers for these national suicide hotlines: 9-544-700-TALK [1-477.478.8599] and 2-883-ASHTNRI [1-263.548.9249]. )  Get treatment  By treating your depression, you can feel more hopeful and have more energy. If you feel better, you may take better care of yourself, so your health may improve. · Talk to your doctor if you have any changes in mood during treatment for your disease. · Ask your doctor for help.  Counseling, antidepressant medicine, or a combination of the two can help most people with depression. Often a combination works best. Counseling can also help you cope with having a chronic disease. When should you call for help? Call 911 anytime you think you may need emergency care. For example, call if:    · You feel like hurting yourself or someone else.     · Someone you know has depression and is about to attempt or is attempting suicide. Call your doctor now or seek immediate medical care if:    · You hear voices.     · Someone you know has depression and:  ? Starts to give away his or her possessions. ? Uses illegal drugs or drinks alcohol heavily. ? Talks or writes about death, including writing suicide notes or talking about guns, knives, or pills. ? Starts to spend a lot of time alone. ? Acts very aggressively or suddenly appears calm. Watch closely for changes in your health, and be sure to contact your doctor if:    · You do not get better as expected. Where can you learn more? Go to http://www.gray.com/  Enter A548 in the search box to learn more about \"Depression and Chronic Disease: Care Instructions. \"  Current as of: January 31, 2020               Content Version: 12.6  © 0010-8573 [employer]. Care instructions adapted under license by [employer] (which disclaims liability or warranty for this information). If you have questions about a medical condition or this instruction, always ask your healthcare professional. [address] any warranty or liability for your use of this information.

## 2020-12-07 NOTE — PROGRESS NOTES
ALEYDA   Allen Beth is a 67 y.o. female, she presents today for:  Medicare wellness and general follow-up     No new concerns, notes feeling more sedentary, fearful to walking and falling outside. Notes a decline in mood over past year. 3 most recent PHQ Screens 12/7/2020   Little interest or pleasure in doing things Several days   Feeling down, depressed, irritable, or hopeless More than half the days   Total Score PHQ 2 3   Trouble falling or staying asleep, or sleeping too much Several days   Feeling tired or having little energy Several days   Poor appetite, weight loss, or overeating Not at all   Feeling bad about yourself - or that you are a failure or have let yourself or your family down Not at all   Trouble concentrating on things such as school, work, reading, or watching TV Several days   Moving or speaking so slowly that other people could have noticed; or the opposite being so fidgety that others notice Several days   Thoughts of being better off dead, or hurting yourself in some way Not at all   PHQ 9 Score 7   How difficult have these problems made it for you to do your work, take care of your home and get along with others Not difficult at all     PMH/PSH: reviewed and updated  Sochx:  reports that she has never smoked. She has never used smokeless tobacco. She reports current alcohol use. She reports that she does not use drugs. Famhx: reviewed and updated     All: Allergies   Allergen Reactions    Pcn [Penicillins] Rash, Swelling and Unknown (comments)    Sulfa (Sulfonamide Antibiotics) Unknown (comments)     Med:   Current Outpatient Medications   Medication Sig    amLODIPine (NORVASC) 2.5 mg tablet Take 1 Tab by mouth daily.  metoprolol succinate (TOPROL-XL) 25 mg XL tablet Take 1 Tab by mouth daily.  PARoxetine (PAXIL) 10 mg tablet TAKE 1 TABLET EVERY DAY    Lactobacillus acidophilus (PROBIOTIC PO) Take  by mouth.     cholecalciferol (VITAMIN D3) 400 unit tab tablet Take  by mouth daily.  MULTIVITAMIN PO Take  by mouth as directed.  aspirin 81 mg tablet Take 81 mg by mouth every other day.  CALCIUM PO Take 600 mg by mouth daily.  TRUE METRIX GLUCOSE TEST STRIP strip USE TO CHECK BLOOD SUGAR ONE TIME DAILY    lancets 28 gauge misc CHECK BLOOD SUGAR ONE TIME DAILY    Blood-Glucose Meter monitoring kit To measure blood sugar and average 1 time per day. Pleas provide lowest cost brand. Diagnosis code E11.9    glucose blood VI test strips (ASCENSIA AUTODISC VI, ONE TOUCH ULTRA TEST VI) strip To measure blood sugar and average 1 time per day. Pleas provide lowest cost brand. Diagnosis code E11.9     No current facility-administered medications for this visit. Review of Systems   Constitutional: Negative for chills, fever and malaise/fatigue. Respiratory: Negative for shortness of breath. Cardiovascular: Negative for chest pain. PE:  Blood pressure (!) 144/73, pulse (!) 56, temperature 97.7 °F (36.5 °C), resp. rate 16, height 4' 10\" (1.473 m), weight 151 lb 4 oz (68.6 kg), SpO2 94 %. Body mass index is 31.61 kg/m². Physical Exam  Vitals signs and nursing note reviewed. Constitutional:       General: She is not in acute distress. Appearance: Normal appearance. She is obese. HENT:      Head: Normocephalic and atraumatic. Right Ear: Tympanic membrane normal.      Left Ear: Tympanic membrane normal.      Nose: Nose normal.      Mouth/Throat:      Mouth: Mucous membranes are moist.   Eyes:      Extraocular Movements: Extraocular movements intact. Conjunctiva/sclera: Conjunctivae normal.      Pupils: Pupils are equal, round, and reactive to light. Neck:      Musculoskeletal: Normal range of motion and neck supple. Cardiovascular:      Rate and Rhythm: Normal rate and regular rhythm. Heart sounds: Normal heart sounds. Pulmonary:      Effort: Pulmonary effort is normal.      Breath sounds: Normal breath sounds.    Abdominal:      General: Abdomen is flat. Musculoskeletal: Normal range of motion. Skin:     General: Skin is warm and dry. Neurological:      Mental Status: She is alert and oriented to person, place, and time. Labs:   Results for orders placed or performed in visit on 12/07/20   AMB POC HEMOGLOBIN A1C   Result Value Ref Range    Hemoglobin A1c (POC) 6.5 %       A/P:  67 y.o. female    ICD-10-CM ICD-9-CM    1. Medicare annual wellness visit, subsequent  Z00.00 V70.0    2. Diabetes mellitus type 2, diet-controlled (HCC)  E11.9 250.00 AMB POC HEMOGLOBIN A1C       DIABETES FOOT EXAM      LIPID PANEL      AMB POC URINE, MICROALBUMIN, SEMIQUANT (3 RESULTS)      TSH 3RD GENERATION      TSH 3RD GENERATION      LIPID PANEL   3. Class 1 obesity due to excess calories without serious comorbidity with body mass index (BMI) of 31.0 to 31.9 in adult  E66.09 278.00 CBC WITH AUTOMATED DIFF    A99.40 I60.04 METABOLIC PANEL, COMPREHENSIVE      TSH 3RD GENERATION      TSH 3RD GENERATION      METABOLIC PANEL, COMPREHENSIVE      CBC WITH AUTOMATED DIFF   4. Elevated TSH  R79.89 794.5 TSH 3RD GENERATION      TSH 3RD GENERATION   5. Encounter for screening mammogram for malignant neoplasm of breast  Z12.31 V76.12 [facility] MAMMO BI SCREENING INCL CAD   6. Essential hypertension  I10 401.9    7. Polyp of colon, unspecified part of colon, unspecified type  K63.5 211.3    8. Anxiety  F41.9 300.00      Well woman (non-gyn) exam: history and exam revealed issues as noted below. Cancer screening:    - colon: last c-scope in 12/2009. With history of polyps, planned to do repeat, but deferred. Encouarged patient to discuss with Dr. Carmen Higgins again. - breast: mammogram ordered for later this month.     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 Dsicussed in march. Patient followed up with Dr. Asha Martinez    - plan for 2020 imaging, completed with [facility] in January 2020. Stable. Determined no further imaging to be pursued.      PVCs, PACs, atrial tachycardia/SVT - well controlled on Toprol XL 25mg daily  HTN: BP overall a little above goal. Has been at several viists. Increase amlodipine to 5 mg if labs normal     Obesity: weight improving.        Arthritis: again discussed need for regular movement and stretching to improve overall mobility and joint health.      History of mild TSH elevation on last labs, repeat level normal in march 2019     Diabetes: historically well controlled, with diet alone. Not interested in adding metformin. Monitoring labs requested today   - eye: following with Dr. Castro [facility] [place]. No new complications. May be getting catract surgery in the futre.    - foot exam: normal today   - influenza vaccine,done   - march bmp shows good kidney function   - microalbumin in normal range.      Fecal incontinence: improved with metamucil, previously followed with Dr. Estelle Curling. But gave up metamucil because she. Discussed possible colonoscop.     RAMANDEEP: minimally reviewed today, continues on paxil. Denies any active symptoms of depression/anxiety. - She was given AVS and expressed understanding with the diagnosis and plan as discussed. Follow-up and Dispositions    · Return in about 6 months (around 6/7/2021) for follow-up diabetes.        Future Appointments   Date Time Provider Dre Lu   6/7/2021  9:30 AM MD ASHU Buckley AMB   10/6/2021 10:20 AM MD PAMELA Encinas AMB     This is the Subsequent Medicare Annual Wellness Exam, performed 12 months or more after the Initial AWV or the last Subsequent AWV    I have reviewed the patient's medical history in detail and updated the computerized patient record. Depression Risk Factor Screening:     3 most recent PHQ Screens 12/7/2020   Little interest or pleasure in doing things Several days   Feeling down, depressed, irritable, or hopeless More than half the days   Total Score PHQ 2 3   Trouble falling or staying asleep, or sleeping too much Several days   Feeling tired or having little energy Several days   Poor appetite, weight loss, or overeating Not at all   Feeling bad about yourself - or that you are a failure or have let yourself or your family down Not at all   Trouble concentrating on things such as school, work, reading, or watching TV Several days   Moving or speaking so slowly that other people could have noticed; or the opposite being so fidgety that others notice Several days   Thoughts of being better off dead, or hurting yourself in some way Not at all   PHQ 9 Score 7   How difficult have these problems made it for you to do your work, take care of your home and get along with others Not difficult at all       Alcohol Risk Screen   Do you average more than 1 drink per night or more than 7 drinks a week:  No    On any one occasion in the past three months have you have had more than 3 drinks containing alcohol:  No        Functional Ability and Level of Safety:   Hearing: Hearing is good. Activities of Daily Living: The home contains: no safety equipment. Patient does total self care     Ambulation: with no difficulty     Fall Risk:  Fall Risk Assessment, last 12 mths 12/7/2020   Able to walk? Yes   Fall in past 12 months? Yes   Fall with injury? -   Number of falls in past 12 months 1   Fall Risk Score -     Abuse Screen:  Patient is not abused       Cognitive Screening   Has your family/caregiver stated any concerns about your memory: no     Assessment/Plan   Education and counseling provided:  Are appropriate based on today's review and evaluation    Diagnoses and all orders for this visit:    1.  Medicare annual wellness visit, subsequent    2. Diabetes mellitus type 2, diet-controlled (HCC)  -     AMB POC HEMOGLOBIN A1C  -      DIABETES FOOT EXAM  -     LIPID PANEL; Future  -     AMB POC URINE, MICROALBUMIN, SEMIQUANT (3 RESULTS)  -     TSH 3RD GENERATION; Future    3. Class 1 obesity due to excess calories without serious comorbidity with body mass index (BMI) of 31.0 to 31.9 in adult  -     CBC WITH AUTOMATED DIFF; Future  -     METABOLIC PANEL, COMPREHENSIVE; Future  -     TSH 3RD GENERATION; Future    4. Elevated TSH  -     TSH 3RD GENERATION; Future    5. Encounter for screening mammogram for malignant neoplasm of breast  -     [facility] MAMMO BI SCREENING INCL CAD; Future    6. Essential hypertension    7. Polyp of colon, unspecified part of colon, unspecified type    8.  Anxiety        Health Maintenance Due     Health Maintenance Due   Topic Date Due    GLAUCOMA SCREENING Q2Y  09/29/2017    Eye Exam Retinal or Dilated  09/29/2017    Lipid Screen  03/13/2020    MICROALBUMIN Q1  12/03/2020       Patient Care Team   Patient Care Team:  Angelika Newton MD as PCP - General (Internal Medicine)  Angelika Newton MD as PCP - [facility] Empaneled Provider  Real De La O MD (Dermatology)  Eusebia Torres MD (Cardiology)  Gretchen Gould MD (Ophthalmology)    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    Diabetes mellitus type 2, diet-controlled ([facility] [address] E11.9    Colon polyps K63.5    Osteopenia M85.80    PVC (premature ventricular contraction) I49.3    Skin cancer C44.90    Elevated TSH R79.89    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    Class 1 obesity due to excess calories without serious comorbidity in adult E66.09     Past Medical History:   Diagnosis Date    Anxiety     began with menopause    Basal cell carcinoma of skin of face     Bronchitis 03/09/2019    Pt 1st Brittni Gambino    Colon polyps     Dr Lopez Mallory, January 2015 normal colonoscopy, need  year follow up    Diabetes mellitus type 2, diet-controlled ([facility] [address] 9/1/2015    Family history of skin cancer     sister- melanoma; son- several BCC    Hypercholesterolemia     Hypertension     IGT (impaired glucose tolerance)     pre diabetic    Osteopenia july 2015    PVC (premature ventricular contraction) February 2016    Dr Jannette Roblero Skin cancer March 2013    [facility], left cheek, Dr. Rios/Selina [id number] evaluation    Sun-damaged skin     As a child and teen pt had bad sunburns    Sunburn, blistering       Past Surgical History:   Procedure Laterality Date    ENDOSCOPY, COLON, DIAGNOSTIC  12/09    5 years    HX CARPAL TUNNEL RELEASE      bilat    HX POLYPECTOMY  2000,2005    colon     Current Outpatient Medications   Medication Sig Dispense Refill    amLODIPine (NORVASC) 2.5 mg tablet Take 1 Tab by mouth daily. 90 Tab 1    metoprolol succinate (TOPROL-XL) 25 mg XL tablet Take 1 Tab by mouth daily. 90 Tab 2    PARoxetine (PAXIL) 10 mg tablet TAKE 1 TABLET EVERY DAY 90 Tab 3    Lactobacillus acidophilus (PROBIOTIC PO) Take  by mouth.  cholecalciferol (VITAMIN D3) 400 unit tab tablet Take  by mouth daily.  MULTIVITAMIN PO Take  by mouth as directed.  aspirin 81 mg tablet Take 81 mg by mouth every other day.  CALCIUM PO Take 600 mg by mouth daily.  TRUE METRIX GLUCOSE TEST STRIP strip USE TO CHECK BLOOD SUGAR ONE TIME DAILY 100 Strip 3    lancets 28 gauge misc CHECK BLOOD SUGAR ONE TIME DAILY 100 Lancet 3    Blood-Glucose Meter monitoring kit To measure blood sugar and average 1 time per day. Pleas provide lowest cost brand. Diagnosis code E11.9 1 Kit 0    glucose blood VI test strips (ASCENSIA AUTODISC VI, ONE TOUCH ULTRA TEST VI) strip To measure blood sugar and average 1 time per day. Pleas provide lowest cost brand.  Diagnosis code E11.9 100 Strip 4     Allergies   Allergen Reactions    Pcn [Penicillins] Rash, Swelling and Unknown (comments)    Sulfa (Sulfonamide Antibiotics) Unknown (comments)       Family History   Problem Relation Age of Onset    Cancer Mother 39        uterine   [facility] Stroke Mother     Dementia Mother     Other Father         Parkinsons    Crohn's Disease Daughter     Dementia Maternal Aunt         all 5 mat aunts    Colon Cancer Maternal Grandmother     Colon Cancer Paternal Grandfather      Social History     Tobacco Use    Smoking status: Never Smoker    Smokeless tobacco: Never Used   Substance Use Topics    Alcohol use:  Yes     Alcohol/week: 0.0 - 1.0 standard drinks     Frequency: Monthly or less     Drinks per session: 1 or 2     Comment: rare

## 2020-12-07 NOTE — PROGRESS NOTES
RM 2    Chief Complaint   Patient presents with    Well Woman    Anxiety     Feelings of anxiety      Pt. Reports body aches that started 12/5/20. Patient requests labs to be done. Did not go in March due to covid. Pt. Reports fall in home in August of 2020. Roberta Montoya while turning too fast on carpet in home. 1. Have you been to the ER, urgent care clinic since your last visit? Hospitalized since your last visit? No    2. Have you seen or consulted any other health care providers outside of the [address] [place] [address] since your last visit? Include any pap smears or colon screening. No    Health Maintenance Due   Topic Date Due    Shingrix Vaccine Age 49> (1 of 2) 11/27/1994    GLAUCOMA SCREENING Q2Y  09/29/2017    Eye Exam Retinal or Dilated  09/29/2017    Lipid Screen  03/13/2020    A1C test (Diabetic or Prediabetic)  06/03/2020    Flu Vaccine (1) 09/01/2020    Foot Exam Q1  12/03/2020    MICROALBUMIN Q1  12/03/2020    Medicare Yearly Exam  12/03/2020       Learning Assessment 3/31/2014   PRIMARY LEARNER Patient   HIGHEST LEVEL OF EDUCATION - PRIMARY LEARNER  4 YEARS OF COLLEGE   BARRIERS PRIMARY LEARNER NONE   CO-LEARNER CAREGIVER No   PRIMARY LANGUAGE ENGLISH   LEARNER PREFERENCE PRIMARY READING     PICTURES   ANSWERED BY Jorge A Hayden   RELATIONSHIP SELF       Patient had flu shot October 2020    AVS  education, follow up, and recommendations provided and addressed with patient.   services used to advise patient n

## 2020-12-10 NOTE — PROGRESS NOTES
Thyroid hormone level in goal range. Stable liver and kidney function  Lipid level shows LDL up a little from last year. Overall in good ranges. No new changes in medication indicated from these results.    Lab letter generated

## 2020-12-14 DIAGNOSIS — F41.1 GAD (GENERALIZED ANXIETY DISORDER): ICD-10-CM

## 2020-12-15 RX ORDER — PAROXETINE 10 MG/1
TABLET, FILM COATED ORAL
Qty: 90 TAB | Refills: 3 | Status: SHIPPED | OUTPATIENT
Start: 2020-12-15 | End: 2021-12-07 | Stop reason: SDUPTHER

## 2021-01-11 DIAGNOSIS — E11.9 DIABETES MELLITUS TYPE 2, DIET-CONTROLLED (HCC): ICD-10-CM

## 2021-01-11 NOTE — TELEPHONE ENCOUNTER
Last visit 12/07/2020 MD Lofton Presume   Next appointment 06/07/2021 MD Lofton Presume   Previous refill encounter(s)   03/18/2019 Lancets 82 gauge #100 with 3 refills     Requested Prescriptions     Pending Prescriptions Disp Refills    lancets 28 gauge misc 100 Lancet 3     Sig: CHECK BLOOD SUGAR ONE TIME DAILY

## 2021-01-12 RX ORDER — LANCETS 28 GAUGE
EACH MISCELLANEOUS
Qty: 100 LANCET | Refills: 3 | Status: SHIPPED | OUTPATIENT
Start: 2021-01-12 | End: 2022-05-20 | Stop reason: SDUPTHER

## 2021-06-07 ENCOUNTER — OFFICE VISIT (OUTPATIENT)
Dept: INTERNAL MEDICINE CLINIC | Age: 76
End: 2021-06-07
Payer: MEDICARE

## 2021-06-07 VITALS
BODY MASS INDEX: 31.87 KG/M2 | TEMPERATURE: 97.8 F | HEART RATE: 62 BPM | HEIGHT: 58 IN | SYSTOLIC BLOOD PRESSURE: 124 MMHG | OXYGEN SATURATION: 95 % | WEIGHT: 151.8 LBS | DIASTOLIC BLOOD PRESSURE: 84 MMHG

## 2021-06-07 DIAGNOSIS — E11.9 DIABETES MELLITUS TYPE 2, DIET-CONTROLLED (HCC): Primary | ICD-10-CM

## 2021-06-07 DIAGNOSIS — H60.63 CHRONIC OTITIS EXTERNA OF BOTH EARS, UNSPECIFIED TYPE: ICD-10-CM

## 2021-06-07 DIAGNOSIS — F41.9 ANXIETY: ICD-10-CM

## 2021-06-07 DIAGNOSIS — E66.09 CLASS 1 OBESITY DUE TO EXCESS CALORIES WITHOUT SERIOUS COMORBIDITY WITH BODY MASS INDEX (BMI) OF 31.0 TO 31.9 IN ADULT: ICD-10-CM

## 2021-06-07 DIAGNOSIS — Z11.59 NEED FOR HEPATITIS C SCREENING TEST: ICD-10-CM

## 2021-06-07 LAB
ALBUMIN SERPL-MCNC: 3.7 G/DL (ref 3.5–5)
ALBUMIN UR QL STRIP: 10 MG/L
ANION GAP SERPL CALC-SCNC: 3 MMOL/L (ref 5–15)
BUN SERPL-MCNC: 18 MG/DL (ref 6–20)
BUN/CREAT SERPL: 29 (ref 12–20)
CALCIUM SERPL-MCNC: 9.1 MG/DL (ref 8.5–10.1)
CHLORIDE SERPL-SCNC: 106 MMOL/L (ref 97–108)
CO2 SERPL-SCNC: 29 MMOL/L (ref 21–32)
CREAT SERPL-MCNC: 0.63 MG/DL (ref 0.55–1.02)
CREATININE, URINE POC: 50 MG/DL
GLUCOSE SERPL-MCNC: 118 MG/DL (ref 65–100)
HBA1C MFR BLD HPLC: 6.7 % (ref 4.8–5.6)
MICROALBUMIN/CREAT RATIO POC: <30 MG/G
PHOSPHATE SERPL-MCNC: 2.7 MG/DL (ref 2.6–4.7)
POTASSIUM SERPL-SCNC: 4.6 MMOL/L (ref 3.5–5.1)
SODIUM SERPL-SCNC: 138 MMOL/L (ref 136–145)

## 2021-06-07 PROCEDURE — G0463 HOSPITAL OUTPT CLINIC VISIT: HCPCS | Performed by: INTERNAL MEDICINE

## 2021-06-07 PROCEDURE — 1090F PRES/ABSN URINE INCON ASSESS: CPT | Performed by: INTERNAL MEDICINE

## 2021-06-07 PROCEDURE — G8417 CALC BMI ABV UP PARAM F/U: HCPCS | Performed by: INTERNAL MEDICINE

## 2021-06-07 PROCEDURE — G8536 NO DOC ELDER MAL SCRN: HCPCS | Performed by: INTERNAL MEDICINE

## 2021-06-07 PROCEDURE — G8427 DOCREV CUR MEDS BY ELIG CLIN: HCPCS | Performed by: INTERNAL MEDICINE

## 2021-06-07 PROCEDURE — G8754 DIAS BP LESS 90: HCPCS | Performed by: INTERNAL MEDICINE

## 2021-06-07 PROCEDURE — G8752 SYS BP LESS 140: HCPCS | Performed by: INTERNAL MEDICINE

## 2021-06-07 PROCEDURE — G8399 PT W/DXA RESULTS DOCUMENT: HCPCS | Performed by: INTERNAL MEDICINE

## 2021-06-07 PROCEDURE — G8510 SCR DEP NEG, NO PLAN REQD: HCPCS | Performed by: INTERNAL MEDICINE

## 2021-06-07 PROCEDURE — 83036 HEMOGLOBIN GLYCOSYLATED A1C: CPT | Performed by: INTERNAL MEDICINE

## 2021-06-07 PROCEDURE — 82044 UR ALBUMIN SEMIQUANTITATIVE: CPT | Performed by: INTERNAL MEDICINE

## 2021-06-07 PROCEDURE — 3288F FALL RISK ASSESSMENT DOCD: CPT | Performed by: INTERNAL MEDICINE

## 2021-06-07 PROCEDURE — 1100F PTFALLS ASSESS-DOCD GE2>/YR: CPT | Performed by: INTERNAL MEDICINE

## 2021-06-07 PROCEDURE — 99214 OFFICE O/P EST MOD 30 MIN: CPT | Performed by: INTERNAL MEDICINE

## 2021-06-07 RX ORDER — CALCIUM CITRATE/VITAMIN D3 200MG-6.25
TABLET ORAL
Qty: 100 STRIP | Refills: 3 | Status: SHIPPED | OUTPATIENT
Start: 2021-06-07 | End: 2022-07-28 | Stop reason: SDUPTHER

## 2021-06-07 RX ORDER — ACETIC ACID 20.65 MG/ML
4 SOLUTION AURICULAR (OTIC) 2 TIMES DAILY
Qty: 15 ML | Refills: 1 | Status: SHIPPED | OUTPATIENT
Start: 2021-06-07 | End: 2021-06-14

## 2021-06-07 NOTE — PROGRESS NOTES
RM 1    Pt is fasting today   Chief Complaint   Patient presents with    Diabetes       Visit Vitals  /84 (BP 1 Location: Right arm, BP Patient Position: Sitting, BP Cuff Size: Large adult)   Pulse 62   Temp 97.8 °F (36.6 °C)   Ht 4' 10\" (1.473 m)   Wt 151 lb 12.8 oz (68.9 kg)   SpO2 95%   BMI 31.73 kg/m²       3 most recent PHQ Screens 6/7/2021   Little interest or pleasure in doing things Not at all   Feeling down, depressed, irritable, or hopeless Not at all   Total Score PHQ 2 0   Trouble falling or staying asleep, or sleeping too much -   Feeling tired or having little energy -   Poor appetite, weight loss, or overeating -   Feeling bad about yourself - or that you are a failure or have let yourself or your family down -   Trouble concentrating on things such as school, work, reading, or watching TV -   Moving or speaking so slowly that other people could have noticed; or the opposite being so fidgety that others notice -   Thoughts of being better off dead, or hurting yourself in some way -   PHQ 9 Score -   How difficult have these problems made it for you to do your work, take care of your home and get along with others -         1. Have you been to the ER, urgent care clinic since your last visit? Hospitalized since your last visit? no    2. Have you seen or consulted any other health care providers outside of the [address] [place] [address] since your last visit? Include any pap smears or colon screening.  No    Health Maintenance Due   Topic Date Due    Hepatitis C Screening  Never done    Eye Exam Retinal or Dilated  09/29/2017    MICROALBUMIN Q1  12/03/2020    A1C test (Diabetic or Prediabetic)  06/07/2021       Learning Assessment 3/31/2014   PRIMARY LEARNER Patient   HIGHEST LEVEL OF EDUCATION - PRIMARY LEARNER  4 YEARS OF COLLEGE   BARRIERS PRIMARY LEARNER NONE   CO-LEARNER CAREGIVER No   PRIMARY LANGUAGE ENGLISH   LEARNER PREFERENCE PRIMARY READING     PICTURES   ANSWERED BY Ned Ruvalcaba RELATIONSHIP SELF

## 2021-06-07 NOTE — PROGRESS NOTES
A/P:  Edwar Iqbal is a 67 y.o. female, she presents today for:    1. Diabetes mellitus type 2, diet-controlled (HCC)  -     AMB POC HEMOGLOBIN A1C  -     glucose blood VI test strips (True Metrix Glucose Test Strip) strip; USE TO CHECK BLOOD SUGAR ONE TIME DAILY, Normal, Disp-100 Strip, R-3  -     RENAL FUNCTION PANEL; Future  -     AMB POC URINE, MICROALBUMIN, SEMIQUANT (3 RESULTS)  2. Chronic otitis externa of both ears, unspecified type  -     acetic acid (VOSOL) 2 % otic solution; Administer 4 Drops into each ear two (2) times a day for 7 days. , Normal, Disp-15 mL, R-1  3. Class 1 obesity due to excess calories without serious comorbidity with body mass index (BMI) of 31.0 to 31.9 in adult  4. Need for hepatitis C screening test  -     HCV AB W/RFLX TO IVANIA; Future    Diabetes: historically well controlled, with diet alone. Today remains controlled, A1C 6.7,    - eye: following with Dr. Mcgrath [facility] [place]. No new complications. May be getting catract surgery in the [facility].    - foot exam: normal December 2020   - march bmp shows good kidney function   - microalbumin in normal range. HTN: BP at goal today still on amlodipine at 2.5 mg. PVCs, PACs, atrial tachycardia/SVT - well controlled on Toprol XL 25mg daily - following with cardiology annually    Fecal incontinence: improved with metamucil, previously followed with Dr. Pedro Hodges. But gave up metamucil because she. Discussed possible colonoscop.     RAMANDEEP: reviewed today. Notes that she is feeling overall well with paxil at 10 mg. Denies significant symptoms of anxiety. Follow-up and Dispositions    · Return in about 6 months (around 12/7/2021) for medicare wellness. Future Appointments   Date Time Provider Dre Lu   10/6/2021 10:20 AM MD PAMELA Shah AMB       HPI    Notes. She has been well. No new episodes of chest pain nor racing heart. Staying active.  Has not been going out as much as prior years.     Measuring blood pressure at home and these numbers are good. Staying intentional with her exercise      PMH/PSH: reviewed and updated  Sochx/Famhx: reviewed and updated     All: Allergies   Allergen Reactions    Pcn [Penicillins] Rash, Swelling and Unknown (comments)    Sulfa (Sulfonamide Antibiotics) Unknown (comments)     Med:   Current Outpatient Medications   Medication Sig    lancets 28 gauge misc CHECK BLOOD SUGAR ONE TIME DAILY    PARoxetine (PAXIL) 10 mg tablet TAKE 1 TABLET EVERY DAY    amLODIPine (NORVASC) 2.5 mg tablet Take 1 Tab by mouth daily.  metoprolol succinate (TOPROL-XL) 25 mg XL tablet Take 1 Tab by mouth daily.  TRUE METRIX GLUCOSE TEST STRIP strip USE TO CHECK BLOOD SUGAR ONE TIME DAILY    Lactobacillus acidophilus (PROBIOTIC PO) Take  by mouth.  Blood-Glucose Meter monitoring kit To measure blood sugar and average 1 time per day. Pleas provide lowest cost brand. Diagnosis code E11.9    glucose blood VI test strips (ASCENSIA AUTODISC VI, ONE TOUCH ULTRA TEST VI) strip To measure blood sugar and average 1 time per day. Pleas provide lowest cost brand. Diagnosis code E11.9    cholecalciferol (VITAMIN D3) 400 unit tab tablet Take  by mouth daily.  MULTIVITAMIN PO Take  by mouth as directed.  aspirin 81 mg tablet Take 81 mg by mouth every other day.  CALCIUM PO Take 600 mg by mouth daily. No current facility-administered medications for this visit. ROS pertinent for the following:  Review of Systems   Constitutional: Negative for chills, fever and malaise/fatigue. Respiratory: Negative for shortness of breath. Cardiovascular: Negative for chest pain. PE:  Blood pressure 124/84, pulse 62, temperature 97.8 °F (36.6 °C), height 4' 10\" (1.473 m), weight 151 lb 12.8 oz (68.9 kg), SpO2 95 %. Body mass index is 31.73 kg/m². Physical Exam  Vitals and nursing note reviewed. Constitutional:       General: She is not in acute distress. Appearance: Normal appearance. HENT:      Head: Normocephalic and atraumatic. Ears:      Comments: TM wnl. Mild irritation of canals seen. Nose: Nose normal.      Mouth/Throat:      Mouth: Mucous membranes are moist.   Eyes:      Extraocular Movements: Extraocular movements intact. Conjunctiva/sclera: Conjunctivae normal.      Pupils: Pupils are equal, round, and reactive to light. Cardiovascular:      Rate and Rhythm: Normal rate and regular rhythm. Heart sounds: Normal heart sounds. Pulmonary:      Effort: Pulmonary effort is normal.      Breath sounds: Normal breath sounds. Musculoskeletal:         General: Normal range of motion. Cervical back: Normal range of motion and neck supple. Skin:     General: Skin is warm and dry. Neurological:      Mental Status: She is alert and oriented to person, place, and time. Labs:   See addendum for interpretation of labs resulting after time of visit. Results for orders placed or performed in visit on 06/07/21   AMB POC HEMOGLOBIN A1C   Result Value Ref Range    Hemoglobin A1c (POC) 6.7 (A) 4.8 - 5.6 %     She was given AVS and expressed understanding with the diagnosis and plan as discussed. An electronic signature was used to authenticate this note.   -- Arcenio Mansfield MD

## 2021-06-08 LAB
HCV AB S/CO SERPL IA: <0.1 S/CO RATIO (ref 0–0.9)
HCV AB SERPL QL IA: NORMAL

## 2021-07-20 DIAGNOSIS — I10 ESSENTIAL HYPERTENSION: ICD-10-CM

## 2021-07-20 RX ORDER — AMLODIPINE BESYLATE 2.5 MG/1
2.5 TABLET ORAL DAILY
Qty: 90 TABLET | Refills: 1 | Status: SHIPPED | OUTPATIENT
Start: 2021-07-20 | End: 2022-01-10 | Stop reason: SDUPTHER

## 2021-07-20 NOTE — TELEPHONE ENCOUNTER
Requested Prescriptions     Signed Prescriptions Disp Refills    amLODIPine (NORVASC) 2.5 mg tablet 90 Tablet 1     Sig: Take 1 Tablet by mouth daily. Authorizing Provider: Farrukh Mauro     Ordering User: Rosy Coon     Verbal order per Dr. Denis Norton.

## 2021-08-02 ENCOUNTER — TELEPHONE (OUTPATIENT)
Dept: CARDIOLOGY CLINIC | Age: 76
End: 2021-08-02

## 2021-08-02 NOTE — TELEPHONE ENCOUNTER
8/2/2021 at 10:58 left message to advise 10/6/2021 appointment with Dr. Jacqueline Travis has been cancelled - requested return call to discuss cancellation

## 2021-09-04 NOTE — PATIENT INSTRUCTIONS
Learning About Tests When You Have Diabetes  Why do you need regular tests? Diabetes can lead to other health problems if it's not well controlled. You'll need tests to monitor how well your diabetes is controlled and to check for other things like high cholesterol or kidney problems. Having tests on a regular schedule can help your doctor find problems early, when it's easier to manage them. What tests do you need? These are the tests you may need and how often you should have them. A1c blood test.   This test shows the average level of blood sugar over the past 2 to 3 months. It helps your doctor see whether blood sugar levels have been staying within your target range. How often: Every 3 to 6 months  Goal: A blood sugar level in your target range  Blood pressure test.   This test measures the pressure of blood flow in the arteries. Controlling blood pressure can help prevent damage to nerves and blood vessels. How often: Every 3 to 6 months  Goal: A blood pressure level in your target range  Cholesterol test.   This test measures the amount of a type of fat in the blood. It is common for people with diabetes to also have high cholesterol. Too much cholesterol in the blood can build up inside the blood vessels and raise the risk for heart attack and stroke. How often: At the time of your diabetes diagnosis, and as often as your doctor recommends after that  Goal: A cholesterol level in your target range  Albumin-creatinine ratio test.   This test checks for kidney damage by looking for the protein albumin (say \"al-BYOO-vinh\") in the urine. Albumin is normally found in the blood. Kidney damage can let small amounts of it (microalbumin) leak into the urine. How often: Once a year  Goal: No protein in the urine  Blood creatinine test/estimated glomerular filtration (eGFR). The blood creatinine (say \"pldf-KO-ko-neen\") level shows how well your kidneys are working.  Creatinine is a waste product that muscles release into the blood. Blood creatinine is used to estimate the glomerular filtration rate. A high level of creatinine and/or a low eGFR may mean your kidneys are not working as well as they should. How often: Once a year  Goal: Normal level of creatinine in the blood. The eGFR goal is greater than 60 mL/min/1.73 m². Complete foot exam.   The doctor checks for foot sores and whether any sensation has been lost.  How often: Once a year  Goal: Healthy feet with no foot ulcers or loss of feeling  Dental exam and cleaning. The dentist checks for gum disease and tooth decay. People with high blood sugar are more likely to have these problems. How often: Every 6 months  Goal: Healthy teeth and gums  Complete eye exam.   High blood sugar levels can damage the eyes. This exam is done by an ophthalmologist or optometrist. It includes a dilated eye exam. The exam shows whether there's damage to the back of the eye (diabetic retinopathy). How often: Once a year. If you don't have any signs of diabetic retinopathy, your doctor may recommend an exam every 2 years. Goal: No damage to the back of the eye  Thyroid-stimulating hormone (TSH) blood test.   This test checks for thyroid disease. Too little thyroid hormone can cause some medicines (like insulin) to stay in the body longer. This can cause low blood sugar. You may be tested if you have high cholesterol or are a woman over 48years old. How often: As part of your diabetes diagnosis, and as often as your doctor recommends after that  Goal: Normal level of TSH in the blood  Follow-up care is a key part of your treatment and safety. Be sure to make and go to all appointments, and call your doctor if you are having problems. It's also a good idea to know your test results and keep a list of the medicines you take. Where can you learn more?   Go to http://[email or web address].com/  Enter A243 in the search box to learn more about \"Learning About No Tests When You Have Diabetes. \"  Current as of: August 31, 2020               Content Version: 12.8  © 2006-2021 Healthwise, Incorporated. Care instructions adapted under license by [employer] (which disclaims liability or warranty for this information). If you have questions about a medical condition or this instruction, always ask your healthcare professional. [place] [address] any warranty or liability for your use of this information.

## 2021-10-07 ENCOUNTER — OFFICE VISIT (OUTPATIENT)
Dept: CARDIOLOGY CLINIC | Age: 76
End: 2021-10-07
Payer: MEDICARE

## 2021-10-07 VITALS
HEIGHT: 58 IN | OXYGEN SATURATION: 98 % | WEIGHT: 154 LBS | DIASTOLIC BLOOD PRESSURE: 80 MMHG | SYSTOLIC BLOOD PRESSURE: 120 MMHG | BODY MASS INDEX: 32.32 KG/M2 | HEART RATE: 50 BPM | RESPIRATION RATE: 16 BRPM

## 2021-10-07 DIAGNOSIS — I47.1 ATRIAL TACHYCARDIA (HCC): Primary | ICD-10-CM

## 2021-10-07 DIAGNOSIS — E55.9 VITAMIN D DEFICIENCY: ICD-10-CM

## 2021-10-07 DIAGNOSIS — I10 ESSENTIAL HYPERTENSION: ICD-10-CM

## 2021-10-07 DIAGNOSIS — I49.3 PVC (PREMATURE VENTRICULAR CONTRACTION): ICD-10-CM

## 2021-10-07 PROCEDURE — G8399 PT W/DXA RESULTS DOCUMENT: HCPCS | Performed by: NURSE PRACTITIONER

## 2021-10-07 PROCEDURE — 93010 ELECTROCARDIOGRAM REPORT: CPT | Performed by: NURSE PRACTITIONER

## 2021-10-07 PROCEDURE — 1100F PTFALLS ASSESS-DOCD GE2>/YR: CPT | Performed by: NURSE PRACTITIONER

## 2021-10-07 PROCEDURE — 99214 OFFICE O/P EST MOD 30 MIN: CPT | Performed by: NURSE PRACTITIONER

## 2021-10-07 PROCEDURE — G8536 NO DOC ELDER MAL SCRN: HCPCS | Performed by: NURSE PRACTITIONER

## 2021-10-07 PROCEDURE — G8417 CALC BMI ABV UP PARAM F/U: HCPCS | Performed by: NURSE PRACTITIONER

## 2021-10-07 PROCEDURE — 93005 ELECTROCARDIOGRAM TRACING: CPT | Performed by: NURSE PRACTITIONER

## 2021-10-07 PROCEDURE — 1090F PRES/ABSN URINE INCON ASSESS: CPT | Performed by: NURSE PRACTITIONER

## 2021-10-07 PROCEDURE — G8752 SYS BP LESS 140: HCPCS | Performed by: NURSE PRACTITIONER

## 2021-10-07 PROCEDURE — G8432 DEP SCR NOT DOC, RNG: HCPCS | Performed by: NURSE PRACTITIONER

## 2021-10-07 PROCEDURE — 3288F FALL RISK ASSESSMENT DOCD: CPT | Performed by: NURSE PRACTITIONER

## 2021-10-07 PROCEDURE — G8754 DIAS BP LESS 90: HCPCS | Performed by: NURSE PRACTITIONER

## 2021-10-07 PROCEDURE — G8427 DOCREV CUR MEDS BY ELIG CLIN: HCPCS | Performed by: NURSE PRACTITIONER

## 2021-10-07 PROCEDURE — G0463 HOSPITAL OUTPT CLINIC VISIT: HCPCS | Performed by: NURSE PRACTITIONER

## 2021-10-07 NOTE — PROGRESS NOTES
[facility] [place]:   [phone]    HPI: Cecelia Dias, a 67y.o. year-old who presents for follow up regarding her PVCs, atrial tachycardia. She is feeling good  No palpitations or chest pain   No dyspnea with exertion  No PND or LE edema  No dizziness  BP well controlled  Bruises easily even taking ASA every other day   Her exercise continues to be limited by orthopedic issues but she does the best she can and overall she is doing pretty well    Assessment/Plan:  1. Body mass index is 32.19 kg/m². encouraged exercise  2. PVCs, PACs, atrial tachycardia/SVT - well controlled on Toprol XL 25mg daily  -loop monitor in 8/18 without arrhythmias  -will check Mg level with next set of labs  -she will continue to follow up with me again in 1 year    3. Dyspnea with exertion - none currently, no ischemia on stress test 8/18, echo ok 8/18   4. HTN- well controlled on amlodipine 2.5mg daily and Toprol XL 25mg daily    5. Dyslipidemia- LDL 99, diet controlled for now, ca score of zero in 2016  6. Anxiety - on paxil    7. IGT - A1c 6.7%, encouraged exercise   8. Vitamin D deficiency - will check vitamin D level with next set of labs     Loop Monitor 8/18 - no arrhythmias  Echo 9/18 - LV mildly dilated, LVEF 63%, no WMA, LA mildly dilated. Nuc Stress 8/18 - no ischemia, LVEF 64%  Coronary calcium scan 2016 - zero   Stress Echo 9/15 exe 7:00 anteroseptal and apical inferior hypokinesis, AXEL  Holter 9/15 PVC, PAC, atach 133    Soc no tob no etoh  Fhx cancer, CVA no early CAD    She  has a past medical history of Anxiety, Basal cell carcinoma of skin of face, Bronchitis (03/09/2019), Colon polyps, Diabetes mellitus type 2, diet-controlled ([facility] [address] (9/1/2015), Family history of skin cancer, Hypercholesterolemia, Hypertension, IGT (impaired glucose tolerance), Osteopenia (july 2015), PVC (premature ventricular contraction) (February 2016), Skin cancer (March 2013), Sun-damaged skin, and Sunburn, blistering.  She also has no past medical history of Arsenic suspected exposure, Radiation exposure, or Tanning bed exposure. Cardiovascular ROS: negative for palpitations or chest pain    Respiratory ROS: negative for - cough or orthopnea  Neurological ROS: no TIA or stroke symptoms  All other systems negative except as above. PE  Vitals:    10/07/21 1339   BP: 120/80   Pulse: (!) 50   Resp: 16   SpO2: 98%   Weight: 154 lb (69.9 kg)   Height: 4' 10\" (1.473 m)    Body mass index is 32.19 kg/m².    General appearance - alert, well appearing, and in no distress  Mental status - affect appropriate to mood  Eyes - sclera anicteric, moist mucous membranes  Neck - supple, no carotid bruits   Lymphatics - not assessed   Chest - clear to auscultation, no wheezes, rales or rhonchi  Heart - bradycardic, regular rhythm, normal S1, S2, no murmurs, rubs, clicks or gallops  Abdomen - soft, nontender, nondistended  Back exam - full range of motion, no tenderness  Neurological - cranial nerves II through XII grossly intact, no focal deficit  Musculoskeletal - no muscular tenderness noted, normal strength  Extremities - peripheral pulses normal, no pedal edema  Skin - normal coloration  no rashes    12 lead ECG: sinus bradycardia VR 51 bpm     Recent Labs:  Lab Results   Component Value Date/Time    Cholesterol, total 228 (H) 12/07/2020 10:57 AM    HDL Cholesterol 86 12/07/2020 10:57 AM    LDL, calculated 99.6 12/07/2020 10:57 AM    Triglyceride 212 (H) 12/07/2020 10:57 AM    CHOL/HDL Ratio 2.7 12/07/2020 10:57 AM     Lab Results   Component Value Date/Time    Creatinine 0.63 06/07/2021 11:08 AM     Lab Results   Component Value Date/Time    BUN 18 06/07/2021 11:08 AM     Lab Results   Component Value Date/Time    Potassium 4.6 06/07/2021 11:08 AM     Lab Results   Component Value Date/Time    Hemoglobin A1c 7.1 (H) 03/13/2019 09:01 AM     Lab Results   Component Value Date/Time    HGB 15.1 12/07/2020 10:57 AM     Lab Results   Component Value Date/Time PLATELET 757 66/14/5239 10:57 AM       Reviewed:  Past Medical History:   Diagnosis Date    Anxiety     began with menopause    Basal cell carcinoma of skin of face     Bronchitis 03/09/2019    Pt 1st [address]    Colon polyps     Dr Olivia Gregg, January 2015 normal colonoscopy, need  year follow up    Diabetes mellitus type 2, diet-controlled ([facility] [address] 9/1/2015    Family history of skin cancer     sister- melanoma; son- several [address]    Hypercholesterolemia     Hypertension     IGT (impaired glucose tolerance)     pre diabetic    Osteopenia july 2015    PVC (premature ventricular contraction) February 2016    Dr Mily Moses Skin cancer March 2013    [address], left cheek, Dr. Rios/Selina [id number] evaluation    Sun-damaged skin     As a child and teen pt had bad sunburns    Sunburn, blistering      Social History     Tobacco Use   Smoking Status Never Smoker   Smokeless Tobacco Never Used     Social History     Substance and Sexual Activity   Alcohol Use Yes    Alcohol/week: 0.0 - 1.0 standard drinks    Comment: rare     Allergies   Allergen Reactions    Pcn [Penicillins] Rash, Swelling and Unknown (comments)    Sulfa (Sulfonamide Antibiotics) Unknown (comments)       Current Outpatient Medications   Medication Sig    metoprolol succinate (TOPROL-XL) 25 mg XL tablet TAKE 1 TABLET EVERY DAY    amLODIPine (NORVASC) 2.5 mg tablet Take 1 Tablet by mouth daily.  glucose blood VI test strips (True Metrix Glucose Test Strip) strip USE TO CHECK BLOOD SUGAR ONE TIME DAILY    lancets 28 gauge misc CHECK BLOOD SUGAR ONE TIME DAILY    PARoxetine (PAXIL) 10 mg tablet TAKE 1 TABLET EVERY DAY    Lactobacillus acidophilus (PROBIOTIC PO) Take  by mouth.  cholecalciferol (VITAMIN D3) 400 unit tab tablet Take  by mouth daily.  MULTIVITAMIN PO Take  by mouth as directed.  aspirin 81 mg tablet Take 81 mg by mouth every other day.  CALCIUM PO Take 600 mg by mouth daily.      No current facility-administered medications for this visit.        Elias Esquivel, SAMANTHA  [facility] [place]  [address], [address], [address]

## 2021-12-07 ENCOUNTER — OFFICE VISIT (OUTPATIENT)
Dept: INTERNAL MEDICINE CLINIC | Age: 76
End: 2021-12-07
Payer: MEDICARE

## 2021-12-07 VITALS
WEIGHT: 153.38 LBS | SYSTOLIC BLOOD PRESSURE: 136 MMHG | BODY MASS INDEX: 32.19 KG/M2 | HEIGHT: 58 IN | TEMPERATURE: 97.9 F | OXYGEN SATURATION: 97 % | DIASTOLIC BLOOD PRESSURE: 80 MMHG | HEART RATE: 65 BPM

## 2021-12-07 DIAGNOSIS — Z12.31 ENCOUNTER FOR SCREENING MAMMOGRAM FOR MALIGNANT NEOPLASM OF BREAST: ICD-10-CM

## 2021-12-07 DIAGNOSIS — M83.9 ADULT OSTEOMALACIA, UNSPECIFIED: ICD-10-CM

## 2021-12-07 DIAGNOSIS — F41.1 GAD (GENERALIZED ANXIETY DISORDER): ICD-10-CM

## 2021-12-07 DIAGNOSIS — E66.09 CLASS 1 OBESITY DUE TO EXCESS CALORIES WITHOUT SERIOUS COMORBIDITY WITH BODY MASS INDEX (BMI) OF 32.0 TO 32.9 IN ADULT: ICD-10-CM

## 2021-12-07 DIAGNOSIS — M17.11 PRIMARY OSTEOARTHRITIS OF RIGHT KNEE: ICD-10-CM

## 2021-12-07 DIAGNOSIS — E11.9 DIABETES MELLITUS TYPE 2, DIET-CONTROLLED (HCC): ICD-10-CM

## 2021-12-07 DIAGNOSIS — F33.0 MILD EPISODE OF RECURRENT MAJOR DEPRESSIVE DISORDER (HCC): ICD-10-CM

## 2021-12-07 DIAGNOSIS — Z00.00 MEDICARE ANNUAL WELLNESS VISIT, SUBSEQUENT: Primary | ICD-10-CM

## 2021-12-07 PROCEDURE — G8417 CALC BMI ABV UP PARAM F/U: HCPCS | Performed by: INTERNAL MEDICINE

## 2021-12-07 PROCEDURE — G8432 DEP SCR NOT DOC, RNG: HCPCS | Performed by: INTERNAL MEDICINE

## 2021-12-07 PROCEDURE — 3288F FALL RISK ASSESSMENT DOCD: CPT | Performed by: INTERNAL MEDICINE

## 2021-12-07 PROCEDURE — G0463 HOSPITAL OUTPT CLINIC VISIT: HCPCS | Performed by: INTERNAL MEDICINE

## 2021-12-07 PROCEDURE — 1100F PTFALLS ASSESS-DOCD GE2>/YR: CPT | Performed by: INTERNAL MEDICINE

## 2021-12-07 PROCEDURE — G8536 NO DOC ELDER MAL SCRN: HCPCS | Performed by: INTERNAL MEDICINE

## 2021-12-07 PROCEDURE — 99214 OFFICE O/P EST MOD 30 MIN: CPT | Performed by: INTERNAL MEDICINE

## 2021-12-07 PROCEDURE — G8427 DOCREV CUR MEDS BY ELIG CLIN: HCPCS | Performed by: INTERNAL MEDICINE

## 2021-12-07 PROCEDURE — G8399 PT W/DXA RESULTS DOCUMENT: HCPCS | Performed by: INTERNAL MEDICINE

## 2021-12-07 PROCEDURE — 1090F PRES/ABSN URINE INCON ASSESS: CPT | Performed by: INTERNAL MEDICINE

## 2021-12-07 PROCEDURE — G0439 PPPS, SUBSEQ VISIT: HCPCS | Performed by: INTERNAL MEDICINE

## 2021-12-07 PROCEDURE — G8754 DIAS BP LESS 90: HCPCS | Performed by: INTERNAL MEDICINE

## 2021-12-07 PROCEDURE — G8752 SYS BP LESS 140: HCPCS | Performed by: INTERNAL MEDICINE

## 2021-12-07 RX ORDER — PAROXETINE 10 MG/1
15 TABLET, FILM COATED ORAL DAILY
Qty: 135 TABLET | Refills: 1 | Status: SHIPPED | OUTPATIENT
Start: 2021-12-07 | End: 2022-06-07

## 2021-12-07 NOTE — PROGRESS NOTES
RM 2    Chief Complaint   Patient presents with   Dottie Mclean Annual Wellness Visit       Patient reports pain in right knee and stiff neck. Also reports BP was taken twice this morning first reading 180/90 and second reading 175/82. 1. Have you been to the ER, urgent care clinic since your last visit? Hospitalized since your last visit? Ortho On call for right knee pain    2. Have you seen or consulted any other health care providers outside of the [address] [place] [address] [place] since your last visit? Include any pap smears or colon screening. No    Health Maintenance Due   Topic Date Due    Eye Exam Retinal or Dilated  09/29/2017    Flu Vaccine (1) 09/01/2021    COVID-19 Vaccine (3 - Booster for Pfizer series) 10/21/2021    Foot Exam Q1  12/07/2021    A1C test (Diabetic or Prediabetic)  12/07/2021    Lipid Screen  12/07/2021    Medicare Yearly Exam  12/08/2021     Visit Vitals  /80 (BP 1 Location: Left arm, BP Patient Position: Sitting)   Pulse 65   Temp 97.9 °F (36.6 °C) (Oral)   Ht 4' 10\" (1.473 m)   Wt 153 lb 6 oz (69.6 kg)   SpO2 97%   BMI 32.06 kg/m²     Fall Risk Assessment, last 12 mths 12/7/2021   Able to walk? Yes   Fall in past 12 months? 1   Do you feel unsteady? 0   Are you worried about falling 0   Is TUG test greater than 12 seconds? 0   Is the gait abnormal? 0   Number of falls in past 12 months 1   Fall with injury?  1       ADL Assessment 12/7/2021   Feeding yourself No Help Needed   Getting from bed to chair No Help Needed   Getting dressed No Help Needed   Bathing or showering No Help Needed   Walk across the room (includes cane/walker) No Help Needed   Using the telphone No Help Needed   Taking your medications No Help Needed   Preparing meals No Help Needed   Managing money (expenses/bills) No Help Needed   Moderately strenuous housework (laundry) No Help Needed   Shopping for personal items (toiletries/medicines) No Help Needed   Shopping for groceries No Help Needed   Driving No Help Needed Climbing a flight of stairs No Help Needed   Getting to places beyond walking distances No Help Needed             Learning Assessment 3/31/2014   PRIMARY LEARNER Patient   HIGHEST LEVEL OF EDUCATION - PRIMARY LEARNER  4 YEARS OF COLLEGE   BARRIERS PRIMARY LEARNER NONE   CO-LEARNER CAREGIVER No   PRIMARY LANGUAGE ENGLISH   LEARNER PREFERENCE PRIMARY READING     PICTURES   ANSWERED BY Kaitlin Sifuentes   RELATIONSHIP SELF         AVS  education, follow up, and recommendations provided and addressed with patient.   services used to advise patient No

## 2021-12-07 NOTE — PATIENT INSTRUCTIONS
[facility]  : [facility] [address] [place] [address] [address]    Phone: [phone]    practice. 3) someone you can develop trust and rapport with (this can take several visits. Resources to consider:     [facility]:   [email or web address]  Phone: [phone]  [address]  [place], [address], [facility]  ? Clinical Counseling and consulting [facility]:    [facility] [place] , [facility]  Phone: [phone]    [facility] Counseling and Consulting  [facility] [address], [address]   [phone]    [facility]. Multiple locations  Phone [phone]     [facility] circle - [phone]    [facility]  [facility] A [address] counseling  1 [facility] Right [address]  [phone]      Consider:  -  reviewing listings at psychology today  - asking friend for recommendation  - calling insurance to ask who is in [place]. Medicare Wellness Visit, Female     The best way to live healthy is to have a lifestyle where you eat a well-balanced diet, exercise regularly, limit alcohol use, and quit all forms of tobacco/nicotine, if applicable. Regular preventive services are another way to keep healthy. Preventive services (vaccines, screening tests, monitoring & exams) can help personalize your care plan, which helps you manage your own care. Screening tests can find health problems at the earliest stages, when they are easiest to treat. [facility] follows the current, evidence-based guidelines published by the [facility] Jourdan Jad (USPSTF) when recommending preventive services for our patients. Because we follow these guidelines, sometimes recommendations change over time as research supports it. (For example, mammograms used to be recommended annually.  Even though Medicare will still pay for an annual mammogram, the newer guidelines recommend a mammogram every two years for women of average risk). Of course, you and your doctor may decide to screen more often for some diseases, based on your risk and your co-morbidities (chronic disease you are already diagnosed with). Preventive services for you include:  - Medicare offers their members a free annual wellness visit, which is time for you and your primary care provider to discuss and plan for your preventive service needs. Take advantage of this benefit every year!  -All adults over the age of 72 should receive the recommended pneumonia vaccines. Current USPSTF guidelines recommend a series of two vaccines for the best pneumonia protection.   -All adults should have a flu vaccine yearly and a tetanus vaccine every 10 years.   -All adults age 48 and older should receive the shingles vaccines (series of two vaccines). -All adults age 38-68 who are overweight should have a diabetes screening test once every three years.   -All adults born between [birth year] and 1966 should be screened once for Hepatitis C.  -Other screening tests and preventive services for persons with diabetes include: an eye exam to screen for diabetic retinopathy, a kidney function test, a foot exam, and stricter control over your cholesterol.   -Cardiovascular screening for adults with routine risk involves an electrocardiogram (ECG) at intervals determined by your doctor.   -Colorectal cancer screenings should be done for adults age 54-65 with no increased risk factors for colorectal cancer. There are a number of acceptable methods of screening for this type of cancer. Each test has its own benefits and drawbacks. Discuss with your doctor what is most appropriate for you during your annual wellness visit.  The different tests include: colonoscopy (considered the best screening method), a fecal occult blood test, a fecal DNA test, and sigmoidoscopy.    -A bone mass density test is recommended when a woman turns 72 to screen for osteoporosis. This test is only recommended one time, as a screening. Some providers will use this same test as a disease monitoring tool if you already have osteoporosis. -Breast cancer screenings are recommended every other year for women of normal risk, age 54-69.  -Cervical cancer screenings for women over age 72 are only recommended with certain risk factors.      Here is a list of your current Health Maintenance items (your personalized list of preventive services) with a due date:  Health Maintenance Due   Topic Date Due    Eye Exam  09/29/2017    Yearly Flu Vaccine (1) 09/01/2021    COVID-19 Vaccine (3 - Booster for Pfizer series) 10/21/2021    Diabetic Foot Care  12/07/2021    Hemoglobin A1C    12/07/2021    Cholesterol Test   12/07/2021

## 2021-12-07 NOTE — PROGRESS NOTES
A/P:  Dilma Harris is a 67 y.o. female, she presents today for:    1. Medicare annual wellness visit, subsequent  2. RAMANDEEP (generalized anxiety disorder)  -     PARoxetine (PAXIL) 10 mg tablet; Take 1.5 Tablets by mouth daily. , Normal, Disp-135 Tablet, R-1  3. Encounter for screening mammogram for malignant neoplasm of breast  4. Diabetes mellitus type 2, diet-controlled ([facility]  -     CBC WITH AUTOMATED DIFF; Future  -     HEMOGLOBIN A1C WITH EAG; Future  -     MAGNESIUM; Future  -     METABOLIC PANEL, COMPREHENSIVE; Future  -     VITAMIN D, 25 HYDROXY; Future  5. Class 1 obesity due to excess calories without serious comorbidity with body mass index (BMI) of 32.0 to 32.9 in adult  -     CBC WITH AUTOMATED DIFF; Future  -     HEMOGLOBIN A1C WITH EAG; Future  -     MAGNESIUM; Future  -     METABOLIC PANEL, COMPREHENSIVE; Future  -     VITAMIN D, 25 HYDROXY; Future  6. Primary osteoarthritis of right knee  -     REFERRAL TO ORTHOPEDICS  7. Mild episode of recurrent major depressive disorder (HCC)  -     PARoxetine (PAXIL) 10 mg tablet; Take 1.5 Tablets by mouth daily. , Normal, Disp-135 Tablet, R-1  8. Adult osteomalacia, unspecified   -     VITAMIN D, 25 HYDROXY; Future    Well woman (non-gyn) exam: history and exam revealed issues as noted below. Cancer screening:    - colon: last c-scope in 12/2009. With history of polyps, planned to do repeat, but deferred. Encouarged patient to discuss with Dr. Leeann Keen again.   - breast: mammogram ordered for later this month. - cervical, normal screenings none further indciated  Vaccine status: up to date   Cardiovascular risk: BP well controlled, lipid screen due in march  Bone health: daily vit D supplement   Diet and Exercise: not drinking sugary beverages      Diabetes: historically well controlled, with diet alone.    Lab Results   Component Value Date/Time    Hemoglobin A1c 7.1 (H) 03/13/2019 09:01 AM    Hemoglobin A1c (POC) 6.7 (A) 06/07/2021 10:08 AM    - eye: following with Dr. Fuentes [facility]. No new complications. May be getting catract surgery in the futre.    - foot exam: normal December 2020   - march bmp shows good kidney function   - microalbumin in normal range.      HTN: BP at goal today still on amlodipine at 2.5 mg.      PVCs, PACs, atrial tachycardia/SVT - well controlled on Toprol XL 25mg daily - following with cardiology annually     Fecal incontinence: improved with metamucil, previously followed with Dr. Rand Charles.      RAMANDEEP: reviewed today. Notes that she is feeling overall well with paxil at 10 mg. Denies significant symptoms of anxiety. 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 Dsicussed in [address] followed up with Dr. Libertad Bright    -imaging completed with [facility] in January 2020. Stable. Determined no further imaging to be pursued. Labs reordered for [facility] after unable to be drawn with [facility]. - see phone note. Future Appointments   Date Time Provider Dre Tabitha   10/10/2022 10:00 AM Charo Sewell NP [facility]       HPI    Right knee pain - had a hard fall in July. Had already noted some underlying knee pain. Seen at ortho [facility] in Late October. - Noted a very arthritic joint. Gave her a shot of cortisone. Advised to keep \"it moving\"  Had ultrasound to rule out     Has been gradually increasing walking. PMH/PSH: reviewed and updated  Sochx/Famhx: reviewed and updated     All: Allergies   Allergen Reactions    Pcn [Penicillins] Rash, Swelling and Unknown (comments)    Sulfa (Sulfonamide Antibiotics) Unknown (comments)     Med:   Current Outpatient Medications   Medication Sig    metoprolol succinate (TOPROL-XL) 25 mg XL tablet TAKE 1 TABLET EVERY DAY    amLODIPine (NORVASC) 2.5 mg tablet Take 1 Tablet by mouth daily.  PARoxetine (PAXIL) 10 mg tablet TAKE 1 TABLET EVERY DAY    Lactobacillus acidophilus (PROBIOTIC PO) Take  by mouth.     cholecalciferol (VITAMIN D3) 400 unit tab tablet Take  by mouth daily.  MULTIVITAMIN PO Take  by mouth as directed.  aspirin 81 mg tablet Take 81 mg by mouth every other day.  CALCIUM PO Take 600 mg by mouth daily.  glucose blood VI test strips (True Metrix Glucose Test Strip) strip USE TO CHECK BLOOD SUGAR ONE TIME DAILY    lancets 28 gauge misc CHECK BLOOD SUGAR ONE TIME DAILY     No current facility-administered medications for this visit. ROS pertinent for the following:  Review of Systems   Constitutional: Negative for chills, fever and malaise/fatigue. Respiratory: Negative for shortness of breath. Cardiovascular: Negative for chest pain. PE:  Blood pressure 136/80, pulse 65, temperature 97.9 °F (36.6 °C), temperature source Oral, height 4' 10\" (1.473 m), weight 153 lb 6 oz (69.6 kg), SpO2 97 %. Body mass index is 32.06 kg/m². Physical Exam  Vitals and nursing note reviewed. Constitutional:       General: She is not in acute distress. Appearance: Normal appearance. HENT:      Head: Normocephalic and atraumatic. Nose: Nose normal.      Mouth/Throat:      Mouth: Mucous membranes are moist.   Eyes:      Extraocular Movements: Extraocular movements intact. Conjunctiva/sclera: Conjunctivae normal.      Pupils: Pupils are equal, round, and reactive to light. Cardiovascular:      Rate and Rhythm: Normal rate and regular rhythm. Heart sounds: Normal heart sounds. Pulmonary:      Effort: Pulmonary effort is normal.      Breath sounds: Normal breath sounds. Musculoskeletal:         General: Normal range of motion. Cervical back: Normal range of motion and neck supple. Skin:     General: Skin is warm and dry. Neurological:      Mental Status: She is alert and oriented to person, place, and time. Labs:   See addendum for interpretation of labs resulting after time of visit. She was given AVS and expressed understanding with the diagnosis and plan as discussed.      An electronic signature was used to authenticate this note. -- Sangeehta Arroyo MD     This is the Subsequent Medicare Annual Wellness Exam, performed 12 months or more after the Initial AWV or the last Subsequent AWV    I have reviewed the patient's medical history in detail and updated the computerized patient record. Assessment/Plan   Education and counseling provided:  Are appropriate based on today's review and evaluation  Screening Mammography    1. Encounter for screening mammogram for malignant neoplasm of breast  2. RAMANDEEP (generalized anxiety disorder)  The following orders have not been finalized:  -     PARoxetine (PAXIL) 10 mg tablet       Depression Risk Factor Screening     3 most recent PHQ Screens 6/7/2021   Little interest or pleasure in doing things Not at all   Feeling down, depressed, irritable, or hopeless Not at all   Total Score PHQ 2 0   Trouble falling or staying asleep, or sleeping too much -   Feeling tired or having little energy -   Poor appetite, weight loss, or overeating -   Feeling bad about yourself - or that you are a failure or have let yourself or your family down -   Trouble concentrating on things such as school, work, reading, or watching TV -   Moving or speaking so slowly that other people could have noticed; or the opposite being so fidgety that others notice -   Thoughts of being better off dead, or hurting yourself in some way -   PHQ 9 Score -   How difficult have these problems made it for you to do your work, take care of your home and get along with others -       Alcohol Risk Screen    Do you average more than 1 drink per night or more than 7 drinks a week:  No    On any one occasion in the past three months have you have had more than 3 drinks containing alcohol:  No    Functional Ability and Level of Safety    Hearing: Hearing is good. Activities of Daily Living: The home contains: no safety equipment.   Patient does total self care      Ambulation: with mild difficulty     Fall Risk:  Fall Risk Assessment, last 12 mths 12/7/2021   Able to walk? Yes   Fall in past 12 months? 1   Do you feel unsteady? 0   Are you worried about falling 0   Is TUG test greater than 12 seconds? 0   Is the gait abnormal? 0   Number of falls in past 12 months 1   Fall with injury?  1      Abuse Screen:  Patient is not abused       Cognitive Screening    Has your family/caregiver stated any concerns about your memory: no     Health Maintenance Due     Health Maintenance Due   Topic Date Due    Eye Exam Retinal or Dilated  09/29/2017    Flu Vaccine (1) 09/01/2021    COVID-19 Vaccine (3 - Booster for Pfizer series) 10/21/2021    Foot Exam Q1  12/07/2021    A1C test (Diabetic or Prediabetic)  12/07/2021    Lipid Screen  12/07/2021       Patient Care Team   Patient Care Team:  Lexi Oliver MD as PCP - General (Internal Medicine)  Lexi Oliver MD as PCP - [facility] Empaneled Provider  Albert Downs MD (Dermatology)  Kandi Zhu MD (Cardiology)  Bertin Cadena MD (Ophthalmology)    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    Diabetes mellitus type 2, diet-controlled ([address] E11.9    Colon polyps K63.5    Osteopenia M85.80    PVC (premature ventricular contraction) I49.3    Skin cancer C44.90    Elevated TSH R79.89    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    Class 1 obesity due to excess calories without serious comorbidity in adult E66.09     Past Medical History:   Diagnosis Date    Anxiety     began with menopause    Basal cell carcinoma of skin of face     Bronchitis 03/09/2019    Pt [address]    Colon polyps     Dr Enoch Falcon, January 2015 normal colonoscopy, need  year follow up    Diabetes mellitus type 2, diet-controlled ([address] 9/1/2015    Family history of skin cancer     sister- melanoma; son- several [facility]    Hypercholesterolemia     Hypertension     IGT (impaired glucose tolerance)     pre diabetic    Osteopenia july 2015    PVC (premature ventricular contraction) February 2016    Dr German Clemens Skin cancer March 2013    [address], left cheek, Dr. Rios/Selina [id number] evaluation    Sun-damaged skin     As a child and teen pt had bad sunburns    Sunburn, blistering       Past Surgical History:   Procedure Laterality Date    ENDOSCOPY, COLON, DIAGNOSTIC  12/09    5 years    HX CARPAL TUNNEL RELEASE      bilat    HX POLYPECTOMY  2000,2005    colon     Current Outpatient Medications   Medication Sig Dispense Refill    metoprolol succinate (TOPROL-XL) 25 mg XL tablet TAKE 1 TABLET EVERY DAY 90 Tablet 1    amLODIPine (NORVASC) 2.5 mg tablet Take 1 Tablet by mouth daily. 90 Tablet 1    PARoxetine (PAXIL) 10 mg tablet TAKE 1 TABLET EVERY DAY 90 Tab 3    Lactobacillus acidophilus (PROBIOTIC PO) Take  by mouth.  cholecalciferol (VITAMIN D3) 400 unit tab tablet Take  by mouth daily.  MULTIVITAMIN PO Take  by mouth as directed.  aspirin 81 mg tablet Take 81 mg by mouth every other day.  CALCIUM PO Take 600 mg by mouth daily.       glucose blood VI test strips (True Metrix Glucose Test Strip) strip USE TO CHECK BLOOD SUGAR ONE TIME DAILY 100 Strip 3    lancets 28 gauge misc CHECK BLOOD SUGAR ONE TIME DAILY 100 Lancet 3     Allergies   Allergen Reactions    Pcn [Penicillins] Rash, Swelling and Unknown (comments)    Sulfa (Sulfonamide Antibiotics) Unknown (comments)       Family History   Problem Relation Age of Onset    Cancer Mother 39        uterine    Stroke Mother     Dementia Mother     Other Father         Parkinsons    Crohn's Disease Daughter     Dementia Maternal Aunt         all 5 mat aunts    Colon Cancer Maternal Grandmother     Colon Cancer Paternal Grandfather      Social History     Tobacco Use    Smoking status: Never Smoker    Smokeless tobacco: Never Used   Substance Use Topics    Alcohol use:  Yes     Alcohol/week: 0.0 - 1.0 standard drinks     Comment: kilo Echevarria MD

## 2021-12-09 ENCOUNTER — TELEPHONE (OUTPATIENT)
Dept: INTERNAL MEDICINE CLINIC | Age: 76
End: 2021-12-09

## 2021-12-10 ENCOUNTER — DOCUMENTATION ONLY (OUTPATIENT)
Dept: INTERNAL MEDICINE CLINIC | Age: 76
End: 2021-12-10

## 2021-12-10 NOTE — PROGRESS NOTES
Paroxetine PA submitted via cover my meds:   Cecille Nasreen GÓMEZ Case ID: [id number] - Rx #: [id number]    Dillon Suarez MD     Approval notification received:   Approvedtoday  PA Case: [id number], Status: Approved, Coverage Starts on: 1/1/2021 12:00:00 AM, Coverage Ends on: 12/31/2022 12:00:00 AM. Questions? Contact [phone]. [facility] and they will send medication    Dillon Suarez MD

## 2021-12-10 NOTE — TELEPHONE ENCOUNTER
Patient unable to have labs drawn. I have reordered and sent them to [facility].     Please notify patient they are available in database for her to have drawn at her convenience.      Maggie Coats MD

## 2021-12-13 DIAGNOSIS — E11.9 DIABETES MELLITUS TYPE 2, DIET-CONTROLLED (HCC): ICD-10-CM

## 2021-12-13 DIAGNOSIS — M83.9 ADULT OSTEOMALACIA, UNSPECIFIED: ICD-10-CM

## 2021-12-13 DIAGNOSIS — E66.09 CLASS 1 OBESITY DUE TO EXCESS CALORIES WITHOUT SERIOUS COMORBIDITY WITH BODY MASS INDEX (BMI) OF 32.0 TO 32.9 IN ADULT: ICD-10-CM

## 2021-12-13 LAB
25(OH)D3 SERPL-MCNC: 38.2 NG/ML (ref 30–100)
ALBUMIN SERPL-MCNC: 3.7 G/DL (ref 3.5–5)
ALBUMIN/GLOB SERPL: 1.1 {RATIO} (ref 1.1–2.2)
ALP SERPL-CCNC: 97 U/L (ref 45–117)
ALT SERPL-CCNC: 20 U/L (ref 12–78)
ANION GAP SERPL CALC-SCNC: 7 MMOL/L (ref 5–15)
AST SERPL-CCNC: 11 U/L (ref 15–37)
BASOPHILS # BLD: 0.1 K/UL (ref 0–0.1)
BASOPHILS NFR BLD: 1 % (ref 0–1)
BILIRUB SERPL-MCNC: 0.3 MG/DL (ref 0.2–1)
BUN SERPL-MCNC: 21 MG/DL (ref 6–20)
BUN/CREAT SERPL: 27 (ref 12–20)
CALCIUM SERPL-MCNC: 10 MG/DL (ref 8.5–10.1)
CHLORIDE SERPL-SCNC: 101 MMOL/L (ref 97–108)
CO2 SERPL-SCNC: 28 MMOL/L (ref 21–32)
CREAT SERPL-MCNC: 0.78 MG/DL (ref 0.55–1.02)
DIFFERENTIAL METHOD BLD: ABNORMAL
EOSINOPHIL # BLD: 0.5 K/UL (ref 0–0.4)
EOSINOPHIL NFR BLD: 5 % (ref 0–7)
ERYTHROCYTE [DISTWIDTH] IN BLOOD BY AUTOMATED COUNT: 12.9 % (ref 11.5–14.5)
EST. AVERAGE GLUCOSE BLD GHB EST-MCNC: 151 MG/DL
GLOBULIN SER CALC-MCNC: 3.5 G/DL (ref 2–4)
GLUCOSE SERPL-MCNC: 138 MG/DL (ref 65–100)
HBA1C MFR BLD: 6.9 % (ref 4–5.6)
HCT VFR BLD AUTO: 46.1 % (ref 35–47)
HGB BLD-MCNC: 15.1 G/DL (ref 11.5–16)
IMM GRANULOCYTES # BLD AUTO: 0 K/UL (ref 0–0.04)
IMM GRANULOCYTES NFR BLD AUTO: 0 % (ref 0–0.5)
LYMPHOCYTES # BLD: 1.7 K/UL (ref 0.8–3.5)
LYMPHOCYTES NFR BLD: 19 % (ref 12–49)
MAGNESIUM SERPL-MCNC: 2.1 MG/DL (ref 1.6–2.4)
MCH RBC QN AUTO: 31.3 PG (ref 26–34)
MCHC RBC AUTO-ENTMCNC: 32.8 G/DL (ref 30–36.5)
MCV RBC AUTO: 95.4 FL (ref 80–99)
MONOCYTES # BLD: 0.7 K/UL (ref 0–1)
MONOCYTES NFR BLD: 8 % (ref 5–13)
NEUTS SEG # BLD: 6.1 K/UL (ref 1.8–8)
NEUTS SEG NFR BLD: 67 % (ref 32–75)
NRBC # BLD: 0 K/UL (ref 0–0.01)
NRBC BLD-RTO: 0 PER 100 WBC
PLATELET # BLD AUTO: 344 K/UL (ref 150–400)
PMV BLD AUTO: 9.9 FL (ref 8.9–12.9)
POTASSIUM SERPL-SCNC: 4.7 MMOL/L (ref 3.5–5.1)
PROT SERPL-MCNC: 7.2 G/DL (ref 6.4–8.2)
RBC # BLD AUTO: 4.83 M/UL (ref 3.8–5.2)
SODIUM SERPL-SCNC: 136 MMOL/L (ref 136–145)
WBC # BLD AUTO: 9 K/UL (ref 3.6–11)

## 2022-01-07 ENCOUNTER — OFFICE VISIT (OUTPATIENT)
Dept: ORTHOPEDIC SURGERY | Age: 77
End: 2022-01-07
Payer: MEDICARE

## 2022-01-07 VITALS — BODY MASS INDEX: 32.12 KG/M2 | HEIGHT: 58 IN | WEIGHT: 153 LBS

## 2022-01-07 DIAGNOSIS — M17.11 ARTHRITIS OF KNEE, RIGHT: ICD-10-CM

## 2022-01-07 DIAGNOSIS — M25.561 RIGHT KNEE PAIN, UNSPECIFIED CHRONICITY: Primary | ICD-10-CM

## 2022-01-07 PROCEDURE — G8427 DOCREV CUR MEDS BY ELIG CLIN: HCPCS | Performed by: ORTHOPAEDIC SURGERY

## 2022-01-07 PROCEDURE — 1101F PT FALLS ASSESS-DOCD LE1/YR: CPT | Performed by: ORTHOPAEDIC SURGERY

## 2022-01-07 PROCEDURE — G8399 PT W/DXA RESULTS DOCUMENT: HCPCS | Performed by: ORTHOPAEDIC SURGERY

## 2022-01-07 PROCEDURE — G8432 DEP SCR NOT DOC, RNG: HCPCS | Performed by: ORTHOPAEDIC SURGERY

## 2022-01-07 PROCEDURE — G8536 NO DOC ELDER MAL SCRN: HCPCS | Performed by: ORTHOPAEDIC SURGERY

## 2022-01-07 PROCEDURE — 20610 DRAIN/INJ JOINT/BURSA W/O US: CPT | Performed by: ORTHOPAEDIC SURGERY

## 2022-01-07 PROCEDURE — 99203 OFFICE O/P NEW LOW 30 MIN: CPT | Performed by: ORTHOPAEDIC SURGERY

## 2022-01-07 PROCEDURE — G8756 NO BP MEASURE DOC: HCPCS | Performed by: ORTHOPAEDIC SURGERY

## 2022-01-07 PROCEDURE — G8417 CALC BMI ABV UP PARAM F/U: HCPCS | Performed by: ORTHOPAEDIC SURGERY

## 2022-01-07 PROCEDURE — 1090F PRES/ABSN URINE INCON ASSESS: CPT | Performed by: ORTHOPAEDIC SURGERY

## 2022-01-07 RX ORDER — TRIAMCINOLONE ACETONIDE 40 MG/ML
40 INJECTION, SUSPENSION INTRA-ARTICULAR; INTRAMUSCULAR ONCE
Status: COMPLETED | OUTPATIENT
Start: 2022-01-07 | End: 2022-01-07

## 2022-01-07 RX ADMIN — TRIAMCINOLONE ACETONIDE 40 MG: 40 INJECTION, SUSPENSION INTRA-ARTICULAR; INTRAMUSCULAR at 13:58

## 2022-01-07 NOTE — PROGRESS NOTES
Dwayne Buchanan (: 1945) is a 67 y.o. female patient, here for evaluation of the following chief complaint(s):  Knee Pain (right knee pain)       ASSESSMENT/PLAN:  Below is the assessment and plan developed based on review of pertinent history, physical exam, labs, studies, and medications. 39-year-old female comes in today with right-sided knee osteoarthritis. She has obvious osteochondral lesion/subchondral insufficiency fracture intermediate femoral condyle with chronic patellofemoral osteoarthritis. She would like to try 1 more steroid injection to see if this helps. I injected 40 mg of triamcinolone into her right knee joint using sterile technique after verbal consent was obtained. She tolerated the procedure well. If this does not work we may consider possible total knee replacement versus hyaluronic acid      1. Right knee pain, unspecified chronicity  -     XR KNEE RT MIN 4 V; Future  2. Arthritis of knee, right  -     DRAIN/INJECT LARGE JOINT/BURSA      Encounter Diagnoses   Name Primary?  Right knee pain, unspecified chronicity Yes    Arthritis of knee, right         No follow-ups on file. SUBJECTIVE/OBJECTIVE:  Dwayne Buchanan (: 1945) is a 67 y.o. female who presents today for the following:  Chief Complaint   Patient presents with    Knee Pain     right knee pain       39-year-old female comes in today complaint of right-sided knee pain. This began last summer after a fall. She says it has been persistent and has not improved over 6 months. She had a steroid injection about 10 weeks ago and said that this did not help more than a week. She says the pain is every day and is rated as moderate.   She can walk less than 1 block without pain    IMAGING:  XR Results (most recent):  Results from Appointment encounter on 22    XR KNEE RT MIN 4 V    Narrative  4 views were ordered and independent reviewed right knee she has evidence of a likely osteochondral lesion of the medial femoral condyle cyst and surrounding subchondral sclerosis. She also has significant patellofemoral joint space narrowing with osteophyte formation and subchondral sclerosis present with maltracking       Allergies   Allergen Reactions    Pcn [Penicillins] Rash, Swelling and Unknown (comments)    Sulfa (Sulfonamide Antibiotics) Unknown (comments)       Current Outpatient Medications   Medication Sig    PARoxetine (PAXIL) 10 mg tablet Take 1.5 Tablets by mouth daily.  metoprolol succinate (TOPROL-XL) 25 mg XL tablet TAKE 1 TABLET EVERY DAY    amLODIPine (NORVASC) 2.5 mg tablet Take 1 Tablet by mouth daily.  glucose blood VI test strips (True Metrix Glucose Test Strip) strip USE TO CHECK BLOOD SUGAR ONE TIME DAILY    lancets 28 gauge misc CHECK BLOOD SUGAR ONE TIME DAILY    Lactobacillus acidophilus (PROBIOTIC PO) Take  by mouth.  cholecalciferol (VITAMIN D3) 400 unit tab tablet Take  by mouth daily.  MULTIVITAMIN PO Take  by mouth as directed.  aspirin 81 mg tablet Take 81 mg by mouth every other day.  CALCIUM PO Take 600 mg by mouth daily.      Current Facility-Administered Medications   Medication    triamcinolone acetonide (KENALOG-40) 40 mg/mL injection 40 mg       Past Medical History:   Diagnosis Date    Anxiety     began with menopause    Basal cell carcinoma of skin of face     Bronchitis 03/09/2019    Pt [address]    Colon polyps     Dr Tim Toscano, January 2015 normal colonoscopy, need  year follow up    Diabetes mellitus type 2, diet-controlled ([facility] 9/1/2015    Family history of skin cancer     sister- melanoma; son- several [facility]    Hypercholesterolemia     Hypertension     IGT (impaired glucose tolerance)     pre diabetic    Osteopenia july 2015    PVC (premature ventricular contraction) February 2016    Dr Yokasta Perez    Skin cancer March 2013    [facility], left cheek, Dr. Rios/Selina [id number] evaluation    Sun-damaged skin     As a child and teen pt had bad sunburns    Sunburn, blistering         Past Surgical History:   Procedure Laterality Date    ENDOSCOPY, COLON, DIAGNOSTIC  12/09    5 years    HX CARPAL TUNNEL RELEASE      bilat    HX POLYPECTOMY  2000,2005    colon       Family History   Problem Relation Age of Onset   Lorenzo Cancer Mother 39        uterine   Lorenzo Stroke Mother     Dementia Mother     Other Father         Parkinsons    Crohn's Disease Daughter     Dementia Maternal Aunt         all 5 mat aunts    Colon Cancer Maternal Grandmother     Colon Cancer Paternal Grandfather         Social History     Tobacco Use    Smoking status: Never Smoker    Smokeless tobacco: Never Used   Substance Use Topics    Alcohol use: Yes     Alcohol/week: 0.0 - 1.0 standard drinks     Comment: rare        All systems reviewed x 12 and were negative with the exception of an      No flowsheet data found. Vitals:  Ht 4' 10\" (1.473 m)   Wt 153 lb (69.4 kg)   BMI 31.98 kg/m²    Body mass index is 31.98 kg/m². Physical Exam    General: NAD, well developed, well nourished, alert and oriented x 3. Cardiac: Extremities well perfused    Respiratory: Nonlabored breathing    LLE: Normal gait and station. Negative stinchfield. No effusion noted. No previous incisions noted. ROM 0-120 degrees. Grossly stable to varus/valgus stress and anterior/posterior drawer tests. Negative Mc[place]rays. Motor grossly intact. RLE: Obvious antalgic gait. Mild effusion noted. No previous incisions noted. ROM 0-120 degrees. Grossly stable to varus/valgus stress and anterior/posterior drawer tests. Significant medial joint tenderness. .  Motor grossly intact. Vascular: Palpable pedal pulses, equal bilaterally. Skin: Warm well perfused, cap refill < 2 sec. An electronic signature was used to authenticate this note.   -- Arsenio Carranza MD

## 2022-01-10 DIAGNOSIS — I10 ESSENTIAL HYPERTENSION: ICD-10-CM

## 2022-01-10 RX ORDER — AMLODIPINE BESYLATE 2.5 MG/1
2.5 TABLET ORAL DAILY
Qty: 90 TABLET | Refills: 1 | Status: SHIPPED | OUTPATIENT
Start: 2022-01-10 | End: 2022-06-27

## 2022-01-28 NOTE — PROGRESS NOTES
Labs in goal ranges including:   A1C: diabetes/blood sugar in controlled range  CMP: normal liver and kidney function  Mag: normal magnesium level.    Vitamin D: in normal ranges    Lab letter generated

## 2022-02-16 NOTE — PROGRESS NOTES
HPI: Tanja Gonzalez (: 1945) is a 67 y.o. female, patient, here for evaluation of the following chief complaint(s):    Wrist Pain (Left wrist swelling, pain for several weeks. Left hand dominant)  Patient seen today to evaluate her hands. She has complained of wrist pain and swelling that started in early February. She denied any fall or other injury. She now has difficulty making a fist due to the swelling. She has previously received corticosteroid injections into fingers. She has had a left open carpal tunnel release as well as a left ulnar head cyst excision. She denies any numbness or tingling at this stage but complains of more dorsal radial left wrist pain and swelling and is seen for further treatment. She denied any history of gout. She did develop Covid on [place] Day and had a mild case. Vitals:  Ht 4' 11\" (1.499 m)   Wt 148 lb (67.1 kg)   BMI 29.89 kg/m²    Body mass index is 29.89 kg/m². Allergies   Allergen Reactions    Pcn [Penicillins] Rash, Swelling and Unknown (comments)    Sulfa (Sulfonamide Antibiotics) Unknown (comments)       Current Outpatient Medications   Medication Sig    acetaminophen (Tylenol Arthritis Pain) 650 mg TbER Take 650 mg by mouth every eight (8) hours.  methylPREDNISolone (MEDROL DOSEPACK) 4 mg tablet Per dose pack instructions    amLODIPine (NORVASC) 2.5 mg tablet Take 1 Tablet by mouth daily.  PARoxetine (PAXIL) 10 mg tablet Take 1.5 Tablets by mouth daily.  metoprolol succinate (TOPROL-XL) 25 mg XL tablet TAKE 1 TABLET EVERY DAY    Lactobacillus acidophilus (PROBIOTIC PO) Take  by mouth.  cholecalciferol (VITAMIN D3) 400 unit tab tablet Take  by mouth daily.  MULTIVITAMIN PO Take  by mouth as directed.  aspirin 81 mg tablet Take 81 mg by mouth every other day.  CALCIUM PO Take 600 mg by mouth daily.     glucose blood VI test strips (True Metrix Glucose Test Strip) strip USE TO CHECK BLOOD SUGAR ONE TIME DAILY (Patient not taking: Reported on 2/17/2022)    lancets 28 gauge misc CHECK BLOOD SUGAR ONE TIME DAILY (Patient not taking: Reported on 2/17/2022)     No current facility-administered medications for this visit. Past Medical History:   Diagnosis Date    Anxiety     began with menopause    Basal cell carcinoma of skin of face     Bronchitis 03/09/2019    Pt [address]st [address]    Colon polyps     Dr Gregg Kelly, January 2015 normal colonoscopy, need  year follow up    Diabetes mellitus type 2, diet-controlled ([address] 9/1/2015    Family history of skin cancer     sister- melanoma; son- several [facility]    Hypercholesterolemia     Hypertension     IGT (impaired glucose tolerance)     pre diabetic    Osteopenia july 2015    PVC (premature ventricular contraction) February 2016    Dr Judge Newman    Skin cancer March 2013    [facility], left cheek, Dr. Rios/Selina [id number] evaluation    Sun-damaged skin     As a child and teen pt had bad sunburns    Sunburn, blistering         Past Surgical History:   Procedure Laterality Date    ENDOSCOPY, COLON, DIAGNOSTIC  12/09    5 years    HX CARPAL TUNNEL RELEASE      bilat    HX POLYPECTOMY  2000,2005    colon       Family History   Problem Relation Age of Onset   Lyla Ty Cancer Mother 39        uterine   Lyla Ty Stroke Mother     Dementia Mother     Other Father         Parkinsons    Crohn's Disease Daughter     Dementia Maternal Aunt         all 5 mat aunts    Colon Cancer Maternal Grandmother     Colon Cancer Paternal Grandfather         Social History     Tobacco Use    Smoking status: Never Smoker    Smokeless tobacco: Never Used   Substance Use Topics    Alcohol use: Yes     Alcohol/week: 0.0 - 1.0 standard drinks     Comment: rare    Drug use: No        Review of Systems   All other systems reviewed and are negative.       ROS     Positive for: Musculoskeletal    Last edited by Jonas Le on 2/17/2022 10:43 AM. (History)             Physical Exam    Left wrist has dorsal radial soft tissue swelling with tenderness but really no warmth. This is a little more proximal to the STT region and not as radial.  There is a hint of some crepitation pain at the basal joints bilaterally but her pain seem to be accentuated directly over palpation over the wrist itself on the left. No complaints with right wrist.  Currently both hands are moving well with good digital motion and no locking. Imaging:    XR Results (most recent):  Results from Appointment encounter on 02/17/22    XR HAND BILAT AP LAT OBL (MIN 3 V)    Narrative  AP, lateral and oblique x-rays of both hands were obtained. She has a component of thumb CMC and wrist STT basal joint osteoarthritis with narrowing and sclerosis. There is some narrowing of the DIP more than PIP joint spaces and a hint of osteopenia. No acute fracture seen. ASSESSMENT/PLAN:  Below is the assessment and plan developed based on review of pertinent history, physical exam, labs, studies, and medications. Patient examination was consistent with a fairly rapid onset over time course of about 3 weeks of pain and swelling in her left wrist coupled with her x-rays may be consistent with pseudogout or similar inflammatory flare. She does have thumb CMC and STT joint space arthritis but it is relatively mild to moderate. I recommended a Medrol Dosepak and she already is utilizing a topical diclofenac gel. An injection is an option that she currently wanted to avoid but would reconsider when asked to return in 3 weeks. 1. Primary osteoarthritis, left hand  -     XR HAND BILAT AP LAT OBL (MIN 3 V); Future  -     methylPREDNISolone (MEDROL DOSEPACK) 4 mg tablet; Per dose pack instructions, Normal, Disp-1 Dose Pack, R-0  2. Left hand pain  3. Primary osteoarthritis of both first carpometacarpal joints  4. Pseudogout of left wrist      Return in about 4 weeks (around 3/17/2022). An electronic signature was used to authenticate this note.   -- Deepa Wells MD

## 2022-02-17 ENCOUNTER — OFFICE VISIT (OUTPATIENT)
Dept: ORTHOPEDIC SURGERY | Age: 77
End: 2022-02-17
Payer: MEDICARE

## 2022-02-17 VITALS — WEIGHT: 148 LBS | BODY MASS INDEX: 29.84 KG/M2 | HEIGHT: 59 IN

## 2022-02-17 DIAGNOSIS — M18.0 PRIMARY OSTEOARTHRITIS OF BOTH FIRST CARPOMETACARPAL JOINTS: ICD-10-CM

## 2022-02-17 DIAGNOSIS — M11.232 PSEUDOGOUT OF LEFT WRIST: ICD-10-CM

## 2022-02-17 DIAGNOSIS — M19.042 PRIMARY OSTEOARTHRITIS, LEFT HAND: Primary | ICD-10-CM

## 2022-02-17 DIAGNOSIS — M79.642 LEFT HAND PAIN: ICD-10-CM

## 2022-02-17 PROCEDURE — G8417 CALC BMI ABV UP PARAM F/U: HCPCS | Performed by: ORTHOPAEDIC SURGERY

## 2022-02-17 PROCEDURE — G8427 DOCREV CUR MEDS BY ELIG CLIN: HCPCS | Performed by: ORTHOPAEDIC SURGERY

## 2022-02-17 PROCEDURE — 1090F PRES/ABSN URINE INCON ASSESS: CPT | Performed by: ORTHOPAEDIC SURGERY

## 2022-02-17 PROCEDURE — 99203 OFFICE O/P NEW LOW 30 MIN: CPT | Performed by: ORTHOPAEDIC SURGERY

## 2022-02-17 PROCEDURE — G8756 NO BP MEASURE DOC: HCPCS | Performed by: ORTHOPAEDIC SURGERY

## 2022-02-17 PROCEDURE — G8399 PT W/DXA RESULTS DOCUMENT: HCPCS | Performed by: ORTHOPAEDIC SURGERY

## 2022-02-17 PROCEDURE — G8536 NO DOC ELDER MAL SCRN: HCPCS | Performed by: ORTHOPAEDIC SURGERY

## 2022-02-17 PROCEDURE — G8432 DEP SCR NOT DOC, RNG: HCPCS | Performed by: ORTHOPAEDIC SURGERY

## 2022-02-17 PROCEDURE — 1101F PT FALLS ASSESS-DOCD LE1/YR: CPT | Performed by: ORTHOPAEDIC SURGERY

## 2022-02-17 RX ORDER — METHYLPREDNISOLONE 4 MG/1
TABLET ORAL
Qty: 1 DOSE PACK | Refills: 0 | Status: SHIPPED | OUTPATIENT
Start: 2022-02-17 | End: 2022-05-06

## 2022-02-17 RX ORDER — DEXTROMETHORPHAN HYDROBROMIDE, GUAIFENESIN 5; 100 MG/5ML; MG/5ML
650 LIQUID ORAL EVERY 8 HOURS
COMMUNITY

## 2022-02-17 NOTE — LETTER
2/17/2022    Patient: Lila Billings   YOB: 1945   Date of Visit: 2/17/2022     Rupinder Monson MD  [place] [address]  [address] [facility] [address]    Dear Rupinder Monson MD,      Thank you for referring Ms. Young Beverly to [facility] for evaluation. My notes for this consultation are attached. If you have questions, please do not hesitate to call me. I look forward to following your patient along with you.       Sincerely,    Harper Pacheco MD

## 2022-02-17 NOTE — PATIENT INSTRUCTIONS
Learning About Arthritis at the [facility] of the Thumb  What is it? Arthritis at the base of the thumb joint is wear and tear on the cartilage. Cartilage is a firm, thick, slippery tissue. It covers and protects the ends of bones where they meet to form a joint. When you have arthritis, there are changes in the cartilage that cause it to break down. The bones rub together and cause joint damage and pain. What causes it? Experts don't know what causes arthritis at the base of the thumb. But aging, a lot of use, an injury, or family history may play a part. What are the symptoms? Symptoms of arthritis at the base of the thumb include aching in your joint. Or the pain may feel burning or sharp. You may feel clicking, creaking, or catching in the joint. It may get stiff. You may have more pain and less strength when you pinch or  things. Symptoms may come and go, stay the same, or get worse over time. How is it diagnosed? Your doctor can often diagnose arthritis by asking you questions about your joint pain and other symptoms and examining you. You may also have X-rays and blood tests. Blood tests can help make sure another disease isn't causing your symptoms. How is it treated? Arthritis at the base of your thumb may be treated with rest, pain relievers, steroid medicines, using a brace or splint, and--in some cases--surgery. To help relieve pain in the joint, rest your sore hand. Switch hands for some activities. You can try heat and cold therapy, such as hot compresses, paraffin wax, cold packs, or ice massage. Your doctor may give you a splint to wear during some activities or when pain flares up. You can often manage mild or moderate arthritis pain with over-the-counter pain relievers. These include medicines that reduce swelling, such as ibuprofen or naproxen. You can also use acetaminophen. Sometimes these medicines are in creams that you can rub on your thumb and hand.  Your doctor may also prescribe other medicine for your pain. For some people, steroid shots may be an option. If none of the treatments work, your doctor may discuss surgery with you. Follow-up care is a key part of your treatment and safety. Be sure to make and go to all appointments, and call your doctor if you are having problems. It's also a good idea to know your test results and keep a list of the medicines you take. Where can you learn more? Go to http://www.gray.com/  Enter T110 in the search box to learn more about \"Learning About Arthritis at the [facility] of the Thumb. \"  Current as of: April 30, 2021               Content Version: 13.0  © 4363-9051 Healthwise, Incorporated. Care instructions adapted under license by [employer] (which disclaims liability or warranty for this information). If you have questions about a medical condition or this instruction, always ask your healthcare professional. [address] any warranty or liability for your use of this information.

## 2022-03-07 ENCOUNTER — OFFICE VISIT (OUTPATIENT)
Dept: INTERNAL MEDICINE CLINIC | Age: 77
End: 2022-03-07
Payer: MEDICARE

## 2022-03-07 VITALS
OXYGEN SATURATION: 96 % | SYSTOLIC BLOOD PRESSURE: 139 MMHG | TEMPERATURE: 97.8 F | WEIGHT: 150 LBS | DIASTOLIC BLOOD PRESSURE: 83 MMHG | HEIGHT: 59 IN | RESPIRATION RATE: 16 BRPM | HEART RATE: 67 BPM | BODY MASS INDEX: 30.24 KG/M2

## 2022-03-07 DIAGNOSIS — Z83.2 FAMILY HISTORY OF AUTOIMMUNE DISORDER: ICD-10-CM

## 2022-03-07 DIAGNOSIS — M19.039 JOINT INFLAMMATION OF WRIST: ICD-10-CM

## 2022-03-07 DIAGNOSIS — F33.0 MILD EPISODE OF RECURRENT MAJOR DEPRESSIVE DISORDER (HCC): ICD-10-CM

## 2022-03-07 DIAGNOSIS — E78.2 MIXED HYPERLIPIDEMIA: ICD-10-CM

## 2022-03-07 DIAGNOSIS — E11.9 DIABETES MELLITUS TYPE 2, DIET-CONTROLLED (HCC): Primary | ICD-10-CM

## 2022-03-07 DIAGNOSIS — M13.0 POLYARTHRITIS: ICD-10-CM

## 2022-03-07 DIAGNOSIS — I47.1 ATRIAL TACHYCARDIA (HCC): ICD-10-CM

## 2022-03-07 LAB — HBA1C MFR BLD HPLC: 8.3 %

## 2022-03-07 PROCEDURE — G8752 SYS BP LESS 140: HCPCS | Performed by: INTERNAL MEDICINE

## 2022-03-07 PROCEDURE — 99214 OFFICE O/P EST MOD 30 MIN: CPT | Performed by: INTERNAL MEDICINE

## 2022-03-07 PROCEDURE — G8432 DEP SCR NOT DOC, RNG: HCPCS | Performed by: INTERNAL MEDICINE

## 2022-03-07 PROCEDURE — 1090F PRES/ABSN URINE INCON ASSESS: CPT | Performed by: INTERNAL MEDICINE

## 2022-03-07 PROCEDURE — G8399 PT W/DXA RESULTS DOCUMENT: HCPCS | Performed by: INTERNAL MEDICINE

## 2022-03-07 PROCEDURE — 1101F PT FALLS ASSESS-DOCD LE1/YR: CPT | Performed by: INTERNAL MEDICINE

## 2022-03-07 PROCEDURE — G8536 NO DOC ELDER MAL SCRN: HCPCS | Performed by: INTERNAL MEDICINE

## 2022-03-07 PROCEDURE — 83036 HEMOGLOBIN GLYCOSYLATED A1C: CPT | Performed by: INTERNAL MEDICINE

## 2022-03-07 PROCEDURE — G8427 DOCREV CUR MEDS BY ELIG CLIN: HCPCS | Performed by: INTERNAL MEDICINE

## 2022-03-07 PROCEDURE — 3052F HG A1C>EQUAL 8.0%<EQUAL 9.0%: CPT | Performed by: INTERNAL MEDICINE

## 2022-03-07 PROCEDURE — G8754 DIAS BP LESS 90: HCPCS | Performed by: INTERNAL MEDICINE

## 2022-03-07 PROCEDURE — G8417 CALC BMI ABV UP PARAM F/U: HCPCS | Performed by: INTERNAL MEDICINE

## 2022-03-07 RX ORDER — METFORMIN HYDROCHLORIDE 500 MG/1
500 TABLET ORAL 2 TIMES DAILY WITH MEALS
Qty: 180 TABLET | Refills: 1 | Status: SHIPPED | OUTPATIENT
Start: 2022-03-07 | End: 2022-06-08 | Stop reason: SDUPTHER

## 2022-03-07 NOTE — PROGRESS NOTES
RM 2    Chief Complaint   Patient presents with    Diabetes     follow-up       Visit Vitals  /83 (BP 1 Location: Left upper arm, BP Patient Position: Sitting, BP Cuff Size: Adult)   Pulse 67   Temp 97.8 °F (36.6 °C) (Oral)   Resp 16   Ht 4' 11\" (1.499 m)   Wt 150 lb (68 kg)   SpO2 96%   BMI 30.30 kg/m²       3 most recent PHQ Screens 3/7/2022   Little interest or pleasure in doing things More than half the days   Feeling down, depressed, irritable, or hopeless Several days   Total Score PHQ 2 3   Trouble falling or staying asleep, or sleeping too much More than half the days   Feeling tired or having little energy More than half the days   Poor appetite, weight loss, or overeating Not at all   Feeling bad about yourself - or that you are a failure or have let yourself or your family down Not at all   Trouble concentrating on things such as school, work, reading, or watching TV Not at all   Moving or speaking so slowly that other people could have noticed; or the opposite being so fidgety that others notice Several days   Thoughts of being better off dead, or hurting yourself in some way Not at all   PHQ 9 Score 8   How difficult have these problems made it for you to do your work, take care of your home and get along with others Somewhat difficult         1. Have you been to the ER, urgent care clinic since your last visit? Hospitalized since your last visit? No    2. Have you seen or consulted any other health care providers outside of the [address] [place] [address] since your last visit? Include any pap smears or colon screening.  No    Health Maintenance Due   Topic Date Due    Eye Exam Retinal or Dilated  09/29/2017    COVID-19 Vaccine (3 - Booster for Pfizer series) 09/21/2021    Foot Exam Q1  12/07/2021    Lipid Screen  12/07/2021       Learning Assessment 3/31/2014   PRIMARY LEARNER Patient   HIGHEST LEVEL OF EDUCATION - PRIMARY LEARNER  4 YEARS OF COLLEGE   BARRIERS PRIMARY LEARNER NONE CO-LEARNER CAREGIVER No   PRIMARY LANGUAGE ENGLISH   LEARNER PREFERENCE PRIMARY READING     PICTURES   ANSWERED BY Fidel London   RELATIONSHIP SELF       . AVS  education, follow up, and recommendations provided and addressed with patient.  services used to advise patient. No

## 2022-03-07 NOTE — PROGRESS NOTES
A/P:  Tanja Gonzalez is a 67 y.o. female, she presents today for:    1. Diabetes mellitus type 2, diet-controlled (HCC)  -     AMB POC HEMOGLOBIN A1C  -     HM DIABETES FOOT EXAM  -     LIPID PANEL; Future  -     metFORMIN (GLUCOPHAGE) 500 mg tablet; Take 1 Tablet by mouth two (2) times daily (with meals). , Normal, Disp-180 Tablet, R-1  2. Mild episode of recurrent major depressive disorder ([facility] [address]  3. Atrial tachycardia ([facility] [address]  4. Polyarthritis  -     RA + CCP ABS; Future  -     SED RATE (ESR); Future    DMII: historically well controlled with diet alone. However recently worsened, steroid use as well as increased weight and age.    - eye: following with Dr. Betancourt [facility]. No new complications. May be getting catract surgery in the futre.    - foot exam: normal   - add metformin. If going back on steroids, may need to add GLP 1    Polyarthritis: features most consistent with OA, but now with some soft tissue swelling in left wrist.    - esr and ccp requested. HTN: BP at goal today still on amlodipine at 2.5 mg.      PVCs, PACs, atrial tachycardia/SVT - well controlled on Toprol XL 25mg daily - following with cardiology annually     Fecal incontinence: improved with metamucil, previously followed with Dr. Kris Pickard today. Notes that she is feeling overall well with paxil at 10 mg. Denies significant symptoms of anxiety.      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 Dsicussed in [address] [place] [address] E followed up with Dr. Ambrosio Laboy    -imaging completed with [facility] in January 2020. Stable. Determined no further imaging to be pursued. Future Appointments   Date Time Provider Dre Lu   3/10/2022 10:10 AM MD VINAY Martínez BS AMB   10/10/2022 10:00 AM Rissa Sewell NP CAVREY BS AMB     HPI     left hand arthritis - this is the dominant hand for patient. Now with increased right shoulder pain. Had seen Dr. Nikolas Cabello for swelling in hand.  Has had many different     Grandmother with severe arthritis. Started in her 45s. Daughter with crohns disease    3 most recent PHQ Screens 3/7/2022   Little interest or pleasure in doing things More than half the days   Feeling down, depressed, irritable, or hopeless Several days   Total Score PHQ 2 3   Trouble falling or staying asleep, or sleeping too much More than half the days   Feeling tired or having little energy More than half the days   Poor appetite, weight loss, or overeating Not at all   Feeling bad about yourself - or that you are a failure or have let yourself or your family down Not at all   Trouble concentrating on things such as school, work, reading, or watching TV Not at all   Moving or speaking so slowly that other people could have noticed; or the opposite being so fidgety that others notice Several days   Thoughts of being better off dead, or hurting yourself in some way Not at all   PHQ 9 Score 8   How difficult have these problems made it for you to do your work, take care of your home and get along with others Somewhat difficult     PMH/PSH: reviewed and updated  Sochx/Famhx: reviewed and updated     All: Allergies   Allergen Reactions    Pcn [Penicillins] Rash, Swelling and Unknown (comments)    Sulfa (Sulfonamide Antibiotics) Unknown (comments)     Med:   Current Outpatient Medications   Medication Sig    acetaminophen (Tylenol Arthritis Pain) 650 mg TbER Take 650 mg by mouth every eight (8) hours.  amLODIPine (NORVASC) 2.5 mg tablet Take 1 Tablet by mouth daily.  PARoxetine (PAXIL) 10 mg tablet Take 1.5 Tablets by mouth daily.  metoprolol succinate (TOPROL-XL) 25 mg XL tablet TAKE 1 TABLET EVERY DAY    Lactobacillus acidophilus (PROBIOTIC PO) Take  by mouth.  cholecalciferol (VITAMIN D3) 400 unit tab tablet Take  by mouth daily.  MULTIVITAMIN PO Take  by mouth as directed.  aspirin 81 mg tablet Take 81 mg by mouth every other day.     CALCIUM PO Take 600 mg by mouth daily.  methylPREDNISolone (MEDROL DOSEPACK) 4 mg tablet Per dose pack instructions (Patient not taking: Reported on 3/7/2022)    glucose blood VI test strips (True Metrix Glucose Test Strip) strip USE TO CHECK BLOOD SUGAR ONE TIME DAILY (Patient not taking: Reported on 2/17/2022)    lancets 28 gauge misc CHECK BLOOD SUGAR ONE TIME DAILY (Patient not taking: Reported on 2/17/2022)     No current facility-administered medications for this visit. ROS pertinent for the following:  Review of Systems   Constitutional: Negative for chills, fever and malaise/fatigue. Respiratory: Negative for shortness of breath. Cardiovascular: Negative for chest pain. PE:  Blood pressure 139/83, pulse 67, temperature 97.8 °F (36.6 °C), temperature source Oral, resp. rate 16, height 4' 11\" (1.499 m), weight 150 lb (68 kg), SpO2 96 %. Body mass index is 30.3 kg/m². Physical Exam  Vitals and nursing note reviewed. Constitutional:       General: She is not in acute distress. Appearance: Normal appearance. HENT:      Head: Normocephalic and atraumatic. Nose: Nose normal.      Mouth/Throat:      Mouth: Mucous membranes are moist.   Eyes:      Extraocular Movements: Extraocular movements intact. Conjunctiva/sclera: Conjunctivae normal.      Pupils: Pupils are equal, round, and reactive to light. Cardiovascular:      Rate and Rhythm: Normal rate and regular rhythm. Heart sounds: Normal heart sounds. Pulmonary:      Effort: Pulmonary effort is normal.      Breath sounds: Normal breath sounds. Abdominal:      General: Abdomen is flat. Palpations: Abdomen is soft. Musculoskeletal:         General: Normal range of motion. Cervical back: Normal range of motion and neck supple. Skin:     General: Skin is warm and dry. Capillary Refill: Capillary refill takes less than 2 seconds. Neurological:      General: No focal deficit present.       Mental Status: She is alert and oriented to person, place, and time. Psychiatric:         Mood and Affect: Mood normal.         Behavior: Behavior normal.       Labs:   See addendum for interpretation of labs resulting after time of visit. Results for orders placed or performed in visit on 03/07/22   AMB POC HEMOGLOBIN A1C   Result Value Ref Range    Hemoglobin A1c (POC) 8.3 %     She was given AVS and expressed understanding with the diagnosis and plan as discussed. An electronic signature was used to authenticate this note.   -- Rupinder Monson MD

## 2022-03-07 NOTE — PATIENT INSTRUCTIONS
Start metformin 1 tablet daily with any meal, if tolerating well increase after 2 weeks to 2 tablets daily split between 2 meals. If morning blood sugars continue to run over 120, we can increase further to a total of 4 tablets daily. Learning About Metformin for Type 2 Diabetes  Introduction     Metformin is a medicine used to treat type 2 diabetes. It helps keep blood sugar levels on target. You may have tried to eat a healthy diet, lose weight, and get more exercise to keep your blood sugar in your target range. If those things do not help, you may take a medicine called metformin. It helps your body use insulin. This can help you control your blood sugar. You might take it on its own or with other medicines. When taken on its own, metformin should not cause low blood sugar or weight gain. Example  · Metformin (Glucophage)  Possible side effects  Common side effects include:  · Short-term nausea. · Not feeling hungry. · Diarrhea. · Increased gas in your belly. · A metallic taste. You may have side effects or reactions not listed here. Check the information that comes with your medicine. What to know about taking this medicine  · Metformin does not usually cause low blood sugar. But you may get a low blood sugar when you take metformin and you exercise hard, drink alcohol, or you do not eat enough food. · Sometimes metformin is combined with other diabetes medicine. Some of these can cause low blood sugar. · If you need a test that uses a dye or you need to have surgery, be sure to tell all of your doctors that you take metformin. You may have to stop taking it before and after the test or surgery. · Over time, blood levels of vitamin B12 can decrease in some people who take metformin. Your body needs this B vitamin to make blood cells. It also keeps your nervous system healthy.  If you have been taking metformin for more than a few years, ask your doctor if you need a B12 blood test to measure the amount of vitamin B12 in your blood. · Be safe with medicines. Take your medicines exactly as prescribed. Call your doctor if you think you are having a problem with your medicine. · Check with your doctor or pharmacist before you use any other medicines. This includes over-the-counter medicines. Make sure your doctor knows all of the medicines, vitamins, herbal products, and supplements you take. Taking some medicines together can cause problems. Where can you learn more? Go to http://www.loving.com/  Enter J360 in the search box to learn more about \"Learning About Metformin for Type 2 Diabetes. \"  Current as of: August 31, 2020               Content Version: 13.0  © 5987-7544 Healthwise, Incorporated. Care instructions adapted under license by [employer] (which disclaims liability or warranty for this information). If you have questions about a medical condition or this instruction, always ask your healthcare professional. [address] any warranty or liability for your use of this information.

## 2022-03-08 LAB
CHOLEST SERPL-MCNC: 212 MG/DL
ERYTHROCYTE [SEDIMENTATION RATE] IN BLOOD: 51 MM/HR (ref 0–30)
HDLC SERPL-MCNC: 81 MG/DL
HDLC SERPL: 2.6 {RATIO} (ref 0–5)
LDLC SERPL CALC-MCNC: 106.6 MG/DL (ref 0–100)
TRIGL SERPL-MCNC: 122 MG/DL (ref ?–150)
VLDLC SERPL CALC-MCNC: 24.4 MG/DL

## 2022-03-08 RX ORDER — ROSUVASTATIN CALCIUM 5 MG/1
5 TABLET, COATED ORAL
Qty: 90 TABLET | Refills: 4 | Status: SHIPPED | OUTPATIENT
Start: 2022-03-08 | End: 2022-05-12

## 2022-03-08 NOTE — PROGRESS NOTES
Diabetic patient, not on statin. (previous conversation declined). - called this morning and she is open to considering. We discussed that she should feel no different while taking this, and that if she has a notable increase in cramping of muscles that we would need to re-evaluate together. ESR does show mild elevation in inflammatory markers, this is very non-specific. However with findings of swelling of wrist, and family history, I would recommend evaluation with rheumatology. - rheumatoid arthritis antibodies are still pending.

## 2022-03-09 ENCOUNTER — TELEPHONE (OUTPATIENT)
Dept: RHEUMATOLOGY | Age: 77
End: 2022-03-09

## 2022-03-09 NOTE — TELEPHONE ENCOUNTER
Received call from patient to schedule npt appt referral information in system advise pt that physician schedule is out until Jan 2023 patient stated she would contact her referring provider's office to inform doc schedule. sdh

## 2022-03-10 ENCOUNTER — TELEPHONE (OUTPATIENT)
Dept: INTERNAL MEDICINE CLINIC | Age: 77
End: 2022-03-10

## 2022-03-10 ENCOUNTER — OFFICE VISIT (OUTPATIENT)
Dept: ORTHOPEDIC SURGERY | Age: 77
End: 2022-03-10
Payer: MEDICARE

## 2022-03-10 VITALS — BODY MASS INDEX: 30.24 KG/M2 | HEIGHT: 59 IN | WEIGHT: 150 LBS

## 2022-03-10 DIAGNOSIS — M79.642 LEFT HAND PAIN: ICD-10-CM

## 2022-03-10 DIAGNOSIS — M18.0 PRIMARY OSTEOARTHRITIS OF BOTH FIRST CARPOMETACARPAL JOINTS: Primary | ICD-10-CM

## 2022-03-10 DIAGNOSIS — M19.039 JOINT INFLAMMATION OF WRIST: ICD-10-CM

## 2022-03-10 DIAGNOSIS — M13.0 POLYARTHRITIS: Primary | ICD-10-CM

## 2022-03-10 DIAGNOSIS — M11.232 PSEUDOGOUT OF LEFT WRIST: ICD-10-CM

## 2022-03-10 DIAGNOSIS — M19.042 PRIMARY OSTEOARTHRITIS, LEFT HAND: ICD-10-CM

## 2022-03-10 LAB
CCP IGA+IGG SERPL IA-ACNC: >250 UNITS (ref 0–19)
RHEUMATOID FACT SERPL-ACNC: 22.4 IU/ML (ref 0–15)

## 2022-03-10 PROCEDURE — G8432 DEP SCR NOT DOC, RNG: HCPCS | Performed by: ORTHOPAEDIC SURGERY

## 2022-03-10 PROCEDURE — 20605 DRAIN/INJ JOINT/BURSA W/O US: CPT | Performed by: ORTHOPAEDIC SURGERY

## 2022-03-10 PROCEDURE — G8756 NO BP MEASURE DOC: HCPCS | Performed by: ORTHOPAEDIC SURGERY

## 2022-03-10 PROCEDURE — 1090F PRES/ABSN URINE INCON ASSESS: CPT | Performed by: ORTHOPAEDIC SURGERY

## 2022-03-10 PROCEDURE — 1101F PT FALLS ASSESS-DOCD LE1/YR: CPT | Performed by: ORTHOPAEDIC SURGERY

## 2022-03-10 PROCEDURE — G8536 NO DOC ELDER MAL SCRN: HCPCS | Performed by: ORTHOPAEDIC SURGERY

## 2022-03-10 PROCEDURE — 99213 OFFICE O/P EST LOW 20 MIN: CPT | Performed by: ORTHOPAEDIC SURGERY

## 2022-03-10 PROCEDURE — G8417 CALC BMI ABV UP PARAM F/U: HCPCS | Performed by: ORTHOPAEDIC SURGERY

## 2022-03-10 PROCEDURE — G8427 DOCREV CUR MEDS BY ELIG CLIN: HCPCS | Performed by: ORTHOPAEDIC SURGERY

## 2022-03-10 PROCEDURE — G8399 PT W/DXA RESULTS DOCUMENT: HCPCS | Performed by: ORTHOPAEDIC SURGERY

## 2022-03-10 RX ORDER — BUPIVACAINE HYDROCHLORIDE 5 MG/ML
1 INJECTION, SOLUTION EPIDURAL; INTRACAUDAL ONCE
Status: COMPLETED | OUTPATIENT
Start: 2022-03-10 | End: 2022-03-10

## 2022-03-10 RX ORDER — METHYLPREDNISOLONE ACETATE 40 MG/ML
40 INJECTION, SUSPENSION INTRA-ARTICULAR; INTRALESIONAL; INTRAMUSCULAR; SOFT TISSUE ONCE
Status: COMPLETED | OUTPATIENT
Start: 2022-03-10 | End: 2022-03-10

## 2022-03-10 RX ADMIN — BUPIVACAINE HYDROCHLORIDE 5 MG: 5 INJECTION, SOLUTION EPIDURAL; INTRACAUDAL at 12:00

## 2022-03-10 RX ADMIN — METHYLPREDNISOLONE ACETATE 40 MG: 40 INJECTION, SUSPENSION INTRA-ARTICULAR; INTRALESIONAL; INTRAMUSCULAR; SOFT TISSUE at 12:00

## 2022-03-10 NOTE — PROGRESS NOTES
HPI: Leslie Miller (: 1945) is a 67 y.o. female, patient, here for evaluation of the following chief complaint(s):    Wrist Pain (pain of the left wrist is a five , wakes her up in the middle of the night )  Patient seen today to evaluate her hands. She has complained of wrist pain and swelling that started in early February. She denied any fall or other injury. She now has difficulty making a fist due to the swelling. She has previously received corticosteroid injections into fingers. She has had a left open carpal tunnel release as well as a left ulnar head cyst excision. She denies any numbness or tingling at this stage but complains of more dorsal radial left wrist pain and swelling and is seen for further treatment. She denied any history of gout. She did develop Covid on [place] Day and had a mild case. Vitals:  Ht 4' 11\" (1.499 m)   Wt 150 lb (68 kg)   BMI 30.30 kg/m²    Body mass index is 30.3 kg/m². Allergies   Allergen Reactions    Pcn [Penicillins] Rash, Swelling and Unknown (comments)    Sulfa (Sulfonamide Antibiotics) Unknown (comments)       Current Outpatient Medications   Medication Sig    rosuvastatin (CRESTOR) 5 mg tablet Take 1 Tablet by mouth nightly. Indications: primary prevention of heart attack    metFORMIN (GLUCOPHAGE) 500 mg tablet Take 1 Tablet by mouth two (2) times daily (with meals).  acetaminophen (Tylenol Arthritis Pain) 650 mg TbER Take 650 mg by mouth every eight (8) hours.  amLODIPine (NORVASC) 2.5 mg tablet Take 1 Tablet by mouth daily.  PARoxetine (PAXIL) 10 mg tablet Take 1.5 Tablets by mouth daily.  metoprolol succinate (TOPROL-XL) 25 mg XL tablet TAKE 1 TABLET EVERY DAY    glucose blood VI test strips (True Metrix Glucose Test Strip) strip USE TO CHECK BLOOD SUGAR ONE TIME DAILY    lancets 28 gauge misc CHECK BLOOD SUGAR ONE TIME DAILY    Lactobacillus acidophilus (PROBIOTIC PO) Take  by mouth.     cholecalciferol (VITAMIN D3) 400 unit tab tablet Take  by mouth daily.  MULTIVITAMIN PO Take  by mouth as directed.  aspirin 81 mg tablet Take 81 mg by mouth every other day.  CALCIUM PO Take 600 mg by mouth daily.  methylPREDNISolone (MEDROL DOSEPACK) 4 mg tablet Per dose pack instructions (Patient not taking: Reported on 3/7/2022)     Current Facility-Administered Medications   Medication    methylPREDNISolone acetate (DEPO-MEDROL) 40 mg/mL injection 40 mg    bupivacaine (PF) (MARCAINE) 0.5 % (5 mg/mL) injection 5 mg       Past Medical History:   Diagnosis Date    Anxiety     began with menopause    Basal cell carcinoma of skin of face     Bronchitis 03/09/2019    Pt [address] [place] [facility] polyps     Dr Yuil Trejo, January 2015 normal colonoscopy, need  year follow up    Diabetes mellitus type 2, diet-controlled ([facility] [address] 9/1/2015    Family history of skin cancer     sister- melanoma; son- several BCC    Hypercholesterolemia     Hypertension     IGT (impaired glucose tolerance)     pre diabetic    Osteopenia july 2015    PVC (premature ventricular contraction) February 2016    Dr Josue Graves    Skin cancer March 2013    [facility], left cheek, Dr. Rios/Selina [id number] evaluation    Sun-damaged skin     As a child and teen pt had bad sunburns    Sunburn, blistering         Past Surgical History:   Procedure Laterality Date    ENDOSCOPY, COLON, DIAGNOSTIC  12/09    5 years    HX CARPAL TUNNEL RELEASE      bilat    HX POLYPECTOMY  2000,2005    colon       Family History   Problem Relation Age of Onset   Preston Lasso Cancer Mother 39        uterine   Preston Lasso Stroke Mother     Dementia Mother     Other Father         Parkinsons    Crohn's Disease Daughter     Dementia Maternal Aunt         all 5 mat aunts    Colon Cancer Maternal Grandmother     Colon Cancer Paternal Grandfather         Social History     Tobacco Use    Smoking status: Never Smoker    Smokeless tobacco: Never Used   Substance Use Topics    Alcohol use:  Yes     Alcohol/week: 0.0 - 1.0 standard drinks     Comment: rare    Drug use: No        Review of Systems   All other systems reviewed and are negative. Physical Exam    Left wrist has dorsal radial soft tissue swelling with tenderness but really no warmth. This is a little more proximal to the STT region and not as radial.  There is a hint of some crepitation pain at the basal joints bilaterally but her pain seem to be accentuated directly over palpation over the wrist itself on the left. No complaints with right wrist.  Currently both hands are moving well with good digital motion and no locking. Imaging:    XR Results (most recent):  Results from Appointment encounter on 02/17/22    XR HAND BILAT AP LAT OBL (MIN 3 V)    Narrative  AP, lateral and oblique x-rays of both hands were obtained. She has a component of thumb CMC and wrist STT basal joint osteoarthritis with narrowing and sclerosis. There is some narrowing of the DIP more than PIP joint spaces and a hint of osteopenia. No acute fracture seen. ASSESSMENT/PLAN:  Below is the assessment and plan developed based on review of pertinent history, physical exam, labs, studies, and medications. Patient examination was consistent with a fairly rapid onset over time course of about 3 weeks of pain and swelling in her left wrist coupled with her x-rays may be consistent with pseudogout or similar inflammatory flare. She does have thumb CMC and STT joint space arthritis but it is relatively mild to moderate. She had tried a Medrol Dosepak without long-term relief. She tolerated a left wrist STT joint space injection well on 3/10/2022. Hopefully her pain will slowly improve. I will see her back in 3 to 4 weeks to recheck her progress.       1. Primary osteoarthritis of both first carpometacarpal joints  -     DRAIN/INJECT INTERMEDIATE JOINT/BURSA  -     methylPREDNISolone acetate (DEPO-MEDROL) 40 mg/mL injection 40 mg; 40 mg, Intra artICUlar, ONCE, 1 dose, On Thu 3/10/22 at 1200  -     bupivacaine (PF) (MARCAINE) 0.5 % (5 mg/mL) injection 5 mg; 5 mg (1 mL), Other, ONCE, 1 dose, On Thu 3/10/22 at 1200  2. Primary osteoarthritis, left hand  3. Pseudogout of left wrist  4. Left hand pain    Informed consent was obtained. Patient received a left wrist STT joint space injection with 40 mg of Depo-Medrol mixed with 1 cc of half percent bupivacaine. Return in about 4 weeks (around 4/7/2022). An electronic signature was used to authenticate this note.   -- Caprice Leyden, MD

## 2022-03-10 NOTE — LETTER
3/10/2022    Patient: Wilton Lozano   YOB: 1945   Date of Visit: 3/10/2022     Isidoro Pablo MD  [facility] [address] [facility] [address]    Dear Isidoro Pablo MD,      Thank you for referring Ms. Emerald Donnelly to [facility] for evaluation. My notes for this consultation are attached. If you have questions, please do not hesitate to call me. I look forward to following your patient along with you.       Sincerely,    Ton Frazier MD

## 2022-03-10 NOTE — PROGRESS NOTES
Called patient and discussed positive auto-antibodies. She was not able to schedule with [facility] rheumatology due to the high volume of patients at the practice. Will refer on to [facility] - Dr. Washington Covert.    Provided number and will fax referral    Will MD Jens

## 2022-03-10 NOTE — TELEPHONE ENCOUNTER
Please fax rheumatology referral, printed visit note and printed labs. They are in my nurse outbox on desk.      Thanks  Jeremy Ledezma MD

## 2022-03-18 PROBLEM — R79.89 ELEVATED TSH: Status: ACTIVE | Noted: 2018-09-18

## 2022-03-19 PROBLEM — Q33.0: Status: ACTIVE | Noted: 2019-03-15

## 2022-03-20 PROBLEM — E66.09 CLASS 1 OBESITY DUE TO EXCESS CALORIES WITHOUT SERIOUS COMORBIDITY IN ADULT: Status: ACTIVE | Noted: 2019-12-03

## 2022-03-20 PROBLEM — E66.811 CLASS 1 OBESITY DUE TO EXCESS CALORIES WITHOUT SERIOUS COMORBIDITY IN ADULT: Status: ACTIVE | Noted: 2019-12-03

## 2022-03-28 RX ORDER — METOPROLOL SUCCINATE 25 MG/1
TABLET, EXTENDED RELEASE ORAL
Qty: 90 TABLET | Refills: 1 | Status: SHIPPED | OUTPATIENT
Start: 2022-03-28 | End: 2022-09-22 | Stop reason: SDUPTHER

## 2022-04-27 ENCOUNTER — TELEPHONE (OUTPATIENT)
Dept: INTERNAL MEDICINE CLINIC | Age: 77
End: 2022-04-27

## 2022-04-27 DIAGNOSIS — Z11.59 NEED FOR HEPATITIS B SCREENING TEST: ICD-10-CM

## 2022-04-27 DIAGNOSIS — M05.741 RHEUMATOID ARTHRITIS INVOLVING BOTH HANDS WITH POSITIVE RHEUMATOID FACTOR (HCC): Primary | ICD-10-CM

## 2022-04-27 DIAGNOSIS — Z11.59 NEED FOR HEPATITIS C SCREENING TEST: ICD-10-CM

## 2022-04-27 DIAGNOSIS — Z11.1 SCREENING FOR TUBERCULOSIS: ICD-10-CM

## 2022-04-27 DIAGNOSIS — M13.0 POLYARTHRITIS: ICD-10-CM

## 2022-04-27 DIAGNOSIS — M05.742 RHEUMATOID ARTHRITIS INVOLVING BOTH HANDS WITH POSITIVE RHEUMATOID FACTOR (HCC): Primary | ICD-10-CM

## 2022-04-27 NOTE — TELEPHONE ENCOUNTER
Patient is taking aleve 2 times daily. Pain has increased in the past week. \"feels like it is going all through my body\". - worse torwards evening and nightime - trouble sleep    Scheduled to see rheumatology at the end of June. Will get further serologies and cxr. Patient can schedule earlier appiontment to consider start for dmard.  Probable RA    Mary Joiner MD

## 2022-04-27 NOTE — TELEPHONE ENCOUNTER
Spoke with patient in concerns for joint pain. Pt taking OTC pain relief and its not helping and wants a Rx for pain. She stated that she is traveling a lot lately and pain in unbearable. She has appointment with rheumatology in late June.     Gemini Gandara LPN

## 2022-04-27 NOTE — TELEPHONE ENCOUNTER
----- Message from Jose Grant sent at 4/27/2022  7:48 AM EDT -----  Subject: Message to Provider    QUESTIONS  Information for Provider? wants to speak to provider about her arthritis   pain  ---------------------------------------------------------------------------  --------------  CALL BACK INFO  What is the best way for the office to contact you? OK to leave message on   voicemail  Preferred Call Back Phone Number? [phone]  ---------------------------------------------------------------------------  --------------  SCRIPT ANSWERS  Relationship to Patient?  Self

## 2022-05-04 ENCOUNTER — TELEPHONE (OUTPATIENT)
Dept: INTERNAL MEDICINE CLINIC | Age: 77
End: 2022-05-04

## 2022-05-04 NOTE — TELEPHONE ENCOUNTER
Tuberculosis testing pending. Waiting to see Dr. Rosa Hernandez with rheumatology June 28th. Lab results remain most consistent with rheumatoid arthritis. - plan to disucss diagnosis and start on low dose steroid + methotrexate 7.5 mg+ folic acid 1 mg. Called lab - they have not completed the tuberculosis test - they will chidi her to return for lab draw. Chest x-ray rodered and pending.      Virgilio Fernández MD

## 2022-05-04 NOTE — TELEPHONE ENCOUNTER
----- Message from Joe Perez sent at 5/4/2022  8:49 AM EDT -----  Subject: Results Request    QUESTIONS  Which lab or imaging result is the patient calling about? LABS   administered on 4/29/22  Which provider ordered the test? Esme Morales   At what location was the test performed? [facility]  Date the test was performed? 2022-04-29  Additional Information for Provider? also having problems accessing My   Chart   ---------------------------------------------------------------------------  --------------  CALL BACK INFO  What is the best way for the office to contact you? OK to leave message on   voicemail  Preferred Call Back Phone Number? [phone]  ---------------------------------------------------------------------------  --------------  SCRIPT ANSWERS  Relationship to Patient?  Self

## 2022-05-06 ENCOUNTER — TELEPHONE (OUTPATIENT)
Dept: INTERNAL MEDICINE CLINIC | Age: 77
End: 2022-05-06

## 2022-05-06 DIAGNOSIS — Z11.1 SCREENING FOR TUBERCULOSIS: Primary | ICD-10-CM

## 2022-05-06 NOTE — TELEPHONE ENCOUNTER
----- Message from Susan Thorne sent at 5/6/2022 10:09 AM EDT -----  Subject: Referral Request    QUESTIONS   Reason for referral request? Pt vonnie states had bloodwork completed on   4/29-lab called her & stated they missed some of the labs & they need a   new order for it-they advsd the pt that  is aware of which lab order is   needed; please call pt when order is written; pt will have to wait til   after fup appt w/ because she is in [facility] right now   Has the physician seen you for this condition before? Yes  Select a date? 2022-04-29  Select the Provider the patient wants to be referred to, if known (PCP or   Specialist)? Outside Physician - [facility]   Preferred Specialist (if applicable)? Do you already have an appointment scheduled? No  Additional Information for Provider?   ---------------------------------------------------------------------------  --------------  CALL BACK INFO  What is the best way for the office to contact you? OK to leave message on   voicemail  Preferred Call Back Phone Number? [phone]  ---------------------------------------------------------------------------  --------------  SCRIPT ANSWERS  Relationship to Patient?  Self

## 2022-05-06 NOTE — TELEPHONE ENCOUNTER
Tuberculosis testing incomplete will reorder - patient will likely draw when she comes into office next week.    Melina Vu MD

## 2022-05-12 ENCOUNTER — HOSPITAL ENCOUNTER (OUTPATIENT)
Dept: ULTRASOUND IMAGING | Age: 77
Discharge: HOME | End: 2022-05-12
Attending: INTERNAL MEDICINE
Payer: MEDICARE

## 2022-05-12 ENCOUNTER — OFFICE VISIT (OUTPATIENT)
Dept: INTERNAL MEDICINE CLINIC | Age: 77
End: 2022-05-12
Payer: MEDICARE

## 2022-05-12 VITALS
WEIGHT: 149 LBS | BODY MASS INDEX: 30.04 KG/M2 | RESPIRATION RATE: 16 BRPM | HEART RATE: 65 BPM | SYSTOLIC BLOOD PRESSURE: 146 MMHG | TEMPERATURE: 98 F | OXYGEN SATURATION: 96 % | HEIGHT: 59 IN | DIASTOLIC BLOOD PRESSURE: 77 MMHG

## 2022-05-12 DIAGNOSIS — R10.32 LEFT GROIN PAIN: ICD-10-CM

## 2022-05-12 DIAGNOSIS — M79.89 LEFT LEG SWELLING: ICD-10-CM

## 2022-05-12 DIAGNOSIS — Z11.1 SCREENING FOR TUBERCULOSIS: ICD-10-CM

## 2022-05-12 DIAGNOSIS — M79.89 LEFT LEG SWELLING: Primary | ICD-10-CM

## 2022-05-12 DIAGNOSIS — M19.90 INFLAMMATORY ARTHRITIS: ICD-10-CM

## 2022-05-12 PROCEDURE — 93970 EXTREMITY STUDY: CPT

## 2022-05-12 PROCEDURE — G8427 DOCREV CUR MEDS BY ELIG CLIN: HCPCS | Performed by: INTERNAL MEDICINE

## 2022-05-12 PROCEDURE — G8510 SCR DEP NEG, NO PLAN REQD: HCPCS | Performed by: INTERNAL MEDICINE

## 2022-05-12 PROCEDURE — G8753 SYS BP > OR = 140: HCPCS | Performed by: INTERNAL MEDICINE

## 2022-05-12 PROCEDURE — G8417 CALC BMI ABV UP PARAM F/U: HCPCS | Performed by: INTERNAL MEDICINE

## 2022-05-12 PROCEDURE — 1090F PRES/ABSN URINE INCON ASSESS: CPT | Performed by: INTERNAL MEDICINE

## 2022-05-12 PROCEDURE — G8754 DIAS BP LESS 90: HCPCS | Performed by: INTERNAL MEDICINE

## 2022-05-12 PROCEDURE — 99214 OFFICE O/P EST MOD 30 MIN: CPT | Performed by: INTERNAL MEDICINE

## 2022-05-12 PROCEDURE — G8536 NO DOC ELDER MAL SCRN: HCPCS | Performed by: INTERNAL MEDICINE

## 2022-05-12 PROCEDURE — G8399 PT W/DXA RESULTS DOCUMENT: HCPCS | Performed by: INTERNAL MEDICINE

## 2022-05-12 PROCEDURE — 1101F PT FALLS ASSESS-DOCD LE1/YR: CPT | Performed by: INTERNAL MEDICINE

## 2022-05-12 RX ORDER — GLIMEPIRIDE 1 MG/1
1 TABLET ORAL
Qty: 30 TABLET | Refills: 0 | Status: SHIPPED | OUTPATIENT
Start: 2022-05-12

## 2022-05-12 RX ORDER — LANOLIN ALCOHOL/MO/W.PET/CERES
1000 CREAM (GRAM) TOPICAL DAILY
COMMUNITY

## 2022-05-12 RX ORDER — PREDNISONE 5 MG/1
5 TABLET ORAL DAILY
Qty: 30 TABLET | Refills: 0 | Status: SHIPPED | OUTPATIENT
Start: 2022-05-12

## 2022-05-12 RX ORDER — PREDNISONE 5 MG/1
5 TABLET ORAL DAILY
Qty: 30 TABLET | Refills: 0 | Status: SHIPPED | OUTPATIENT
Start: 2022-05-12 | End: 2022-05-12 | Stop reason: SDUPTHER

## 2022-05-12 RX ORDER — GLIMEPIRIDE 1 MG/1
1 TABLET ORAL
Qty: 30 TABLET | Refills: 0 | Status: SHIPPED | OUTPATIENT
Start: 2022-05-12 | End: 2022-05-12 | Stop reason: SDUPTHER

## 2022-05-12 NOTE — PROGRESS NOTES
A/P:  Rosalina Pablo is a 67 y.o. female, she presents today for:    1. Left leg swelling  -     DUPLEX LOWER EXT VENOUS BILAT; Future  -     CBC W/O DIFF; Future  -     glimepiride (AMARYL) 1 mg tablet; Take 1 Tablet by mouth Daily (before breakfast). If blood sugar over 150., Normal, Disp-30 Tablet, R-0  2. Left groin pain  -     DUPLEX LOWER EXT VENOUS BILAT; Future  3. Screening for tuberculosis  -     QUANTIFERON-TB GOLD PLUS  4. Inflammatory arthritis  -     predniSONE (DELTASONE) 5 mg tablet; Take 1 Tablet by mouth daily. , Normal, Disp-30 Tablet, R-0      Probable RA - polyarthritis - family history of crohn's disease.    - pain has been more severe since getting steroid injections in wrist and knee. Most notable in knee and right shoulder.    - to see rheumatology at [facility] in June. - start low dose prednisone - prn glimperide to manage blood sugar.    - follow-up as scheduled in 3 weeks. Left lower extremity edema - concerning for clot   - patient with multiple risk factors - travel, and inflammation of body. - requesting Doppler today. Future Appointments   Date Time Provider Dre Lu   6/7/2022 10:00 AM Lebron Boxer, MD [facility] BS AMB   10/10/2022 10:00 AM Silva Sewell, SAMANTHA SANTIAGO BS AMB       HPI    Had injections in her hand bilatearlly. And in her right knee. Left leg started swelling. Was travelling by car from [place]. Now with pain in left hip. PMH/PSH: reviewed and updated  Sochx/Famhx: reviewed and updated     All: Allergies   Allergen Reactions    Pcn [Penicillins] Rash, Swelling and Unknown (comments)    Sulfa (Sulfonamide Antibiotics) Unknown (comments)     Med:   Current Outpatient Medications   Medication Sig    cyanocobalamin (Vitamin B-12) 1,000 mcg tablet Take 1,000 mcg by mouth daily.     metoprolol succinate (TOPROL-XL) 25 mg XL tablet TAKE 1 TABLET EVERY DAY    metFORMIN (GLUCOPHAGE) 500 mg tablet Take 1 Tablet by mouth two (2) times daily (with meals).  acetaminophen (Tylenol Arthritis Pain) 650 mg TbER Take 650 mg by mouth every eight (8) hours.  amLODIPine (NORVASC) 2.5 mg tablet Take 1 Tablet by mouth daily.  PARoxetine (PAXIL) 10 mg tablet Take 1.5 Tablets by mouth daily.  glucose blood VI test strips (True Metrix Glucose Test Strip) strip USE TO CHECK BLOOD SUGAR ONE TIME DAILY    lancets 28 gauge misc CHECK BLOOD SUGAR ONE TIME DAILY    Lactobacillus acidophilus (PROBIOTIC PO) Take  by mouth.  cholecalciferol (VITAMIN D3) 400 unit tab tablet Take  by mouth daily.  MULTIVITAMIN PO Take  by mouth as directed.  aspirin 81 mg tablet Take 81 mg by mouth every other day.  CALCIUM PO Take 600 mg by mouth daily.  rosuvastatin (CRESTOR) 5 mg tablet Take 1 Tablet by mouth nightly. Indications: primary prevention of heart attack (Patient not taking: Reported on 5/12/2022)     No current facility-administered medications for this visit. ROS pertinent for the following:  Review of Systems   Constitutional: Negative for chills and fever. Musculoskeletal: Positive for joint pain. Negative for falls. Skin: Negative for rash. Endo/Heme/Allergies: Does not bruise/bleed easily. PE:  Blood pressure (!) 146/77, pulse 65, temperature 98 °F (36.7 °C), temperature source Oral, resp. rate 16, height 4' 11\" (1.499 m), weight 149 lb (67.6 kg), SpO2 96 %. Body mass index is 30.09 kg/m². Physical Exam  Vitals and nursing note reviewed. Constitutional:       General: She is not in acute distress. Appearance: Normal appearance. HENT:      Head: Normocephalic and atraumatic. Eyes:      Extraocular Movements: Extraocular movements intact. Conjunctiva/sclera: Conjunctivae normal.      Pupils: Pupils are equal, round, and reactive to light. Cardiovascular:      Rate and Rhythm: Normal rate and regular rhythm. Heart sounds: Normal heart sounds.    Pulmonary:      Effort: Pulmonary effort is normal. Breath sounds: Normal breath sounds. Musculoskeletal:         General: Normal range of motion. Cervical back: Normal range of motion and neck supple. Left lower leg: Edema (tense appearing edema in foot and lower calf. Small petechiae patch seen. ) present. Skin:     General: Skin is warm and dry. Neurological:      Mental Status: She is alert and oriented to person, place, and time. Psychiatric:         Mood and Affect: Mood normal.         Behavior: Behavior normal.       Labs:   See addendum for interpretation of labs resulting after time of visit. She was given AVS and expressed understanding with the diagnosis and plan as discussed. An electronic signature was used to authenticate this note.   -- Maria Teresa Dietrich MD

## 2022-05-12 NOTE — PROGRESS NOTES
RM 3    Chief Complaint   Patient presents with    Follow-up     discuss joint pain       Visit Vitals  BP (!) 146/77 (BP 1 Location: Left upper arm, BP Patient Position: Sitting, BP Cuff Size: Adult)   Pulse 65   Temp 98 °F (36.7 °C) (Oral)   Resp 16   Ht 4' 11\" (1.499 m)   Wt 149 lb (67.6 kg)   SpO2 96%   BMI 30.09 kg/m²       3 most recent PHQ Screens 5/12/2022   Little interest or pleasure in doing things Not at all   Feeling down, depressed, irritable, or hopeless Not at all   Total Score PHQ 2 0   Trouble falling or staying asleep, or sleeping too much -   Feeling tired or having little energy -   Poor appetite, weight loss, or overeating -   Feeling bad about yourself - or that you are a failure or have let yourself or your family down -   Trouble concentrating on things such as school, work, reading, or watching TV -   Moving or speaking so slowly that other people could have noticed; or the opposite being so fidgety that others notice -   Thoughts of being better off dead, or hurting yourself in some way -   PHQ 9 Score -   How difficult have these problems made it for you to do your work, take care of your home and get along with others -         1. Have you been to the ER, urgent care clinic since your last visit? Hospitalized since your last visit? No    2. Have you seen or consulted any other health care providers outside of the [address] [place] [address] since your last visit? Include any pap smears or colon screening.  No    Health Maintenance Due   Topic Date Due    Eye Exam Retinal or Dilated  09/29/2017    COVID-19 Vaccine (3 - Booster for Pfizer series) 09/21/2021    Foot Exam Q1  12/07/2021    MICROALBUMIN Q1  06/07/2022       Learning Assessment 3/31/2014   PRIMARY LEARNER Patient   HIGHEST LEVEL OF EDUCATION - PRIMARY LEARNER  4 YEARS OF COLLEGE   BARRIERS PRIMARY LEARNER NONE   CO-LEARNER CAREGIVER No   PRIMARY LANGUAGE ENGLISH   LEARNER PREFERENCE PRIMARY READING     PICTURES   ANSWERED BY Néstor Shipman   RELATIONSHIP SELF         AVS  education, follow up, and recommendations provided and addressed with patient.  services used to advise patient. No

## 2022-05-13 LAB
ERYTHROCYTE [DISTWIDTH] IN BLOOD BY AUTOMATED COUNT: 13 % (ref 11.5–14.5)
HCT VFR BLD AUTO: 45 % (ref 35–47)
HGB BLD-MCNC: 13.8 G/DL (ref 11.5–16)
INR PPP: 1 (ref 0.9–1.1)
MCH RBC QN AUTO: 30.6 PG (ref 26–34)
MCHC RBC AUTO-ENTMCNC: 30.7 G/DL (ref 30–36.5)
MCV RBC AUTO: 99.8 FL (ref 80–99)
NRBC # BLD: 0 K/UL (ref 0–0.01)
NRBC BLD-RTO: 0 PER 100 WBC
PLATELET # BLD AUTO: 516 K/UL (ref 150–400)
PMV BLD AUTO: 9.3 FL (ref 8.9–12.9)
PROTHROMBIN TIME: 10.4 SEC (ref 9–11.1)
RBC # BLD AUTO: 4.51 M/UL (ref 3.8–5.2)
WBC # BLD AUTO: 9.4 K/UL (ref 3.6–11)

## 2022-05-16 ENCOUNTER — TELEPHONE (OUTPATIENT)
Dept: INTERNAL MEDICINE CLINIC | Age: 77
End: 2022-05-16

## 2022-05-16 NOTE — TELEPHONE ENCOUNTER
Patient called states that Glimepirde has sulfur in it and she is allergic to sulfur and wants to know if its ok to take.  Patient is not having no reaction

## 2022-05-16 NOTE — TELEPHONE ENCOUNTER
Please advise patient:     Yes - it is fine to take, people who are allergic to sulfa antibiotics (bactrim) are not restricted from this medication    Gregoria Soulier, MD

## 2022-05-17 LAB
GAMMA INTERFERON BACKGROUND BLD IA-ACNC: 0.01 IU/ML
M TB IFN-G BLD-IMP: NEGATIVE
M TB IFN-G CD4+ BCKGRND COR BLD-ACNC: 0.02 IU/ML
MITOGEN IGNF BLD-ACNC: >10 IU/ML
QUANTIFERON INCUBATION, QF1T: NORMAL
QUANTIFERON TB2 AG: 0.02 IU/ML
SERVICE CMNT-IMP: NORMAL

## 2022-05-20 DIAGNOSIS — E11.9 DIABETES MELLITUS TYPE 2, DIET-CONTROLLED (HCC): ICD-10-CM

## 2022-05-20 RX ORDER — LANCETS 28 GAUGE
EACH MISCELLANEOUS
Qty: 100 LANCET | Refills: 3 | Status: SHIPPED | OUTPATIENT
Start: 2022-05-20 | End: 2022-11-02

## 2022-06-04 DIAGNOSIS — F41.1 GAD (GENERALIZED ANXIETY DISORDER): ICD-10-CM

## 2022-06-04 DIAGNOSIS — F33.0 MILD EPISODE OF RECURRENT MAJOR DEPRESSIVE DISORDER (HCC): ICD-10-CM

## 2022-06-05 DIAGNOSIS — M19.90 INFLAMMATORY ARTHRITIS: ICD-10-CM

## 2022-06-05 DIAGNOSIS — E11.9 DIABETES MELLITUS TYPE 2, DIET-CONTROLLED (HCC): ICD-10-CM

## 2022-06-07 ENCOUNTER — OFFICE VISIT (OUTPATIENT)
Dept: INTERNAL MEDICINE CLINIC | Age: 77
End: 2022-06-07
Payer: MEDICARE

## 2022-06-07 VITALS
BODY MASS INDEX: 29.56 KG/M2 | DIASTOLIC BLOOD PRESSURE: 76 MMHG | TEMPERATURE: 98 F | SYSTOLIC BLOOD PRESSURE: 160 MMHG | HEART RATE: 60 BPM | RESPIRATION RATE: 16 BRPM | WEIGHT: 146.6 LBS | OXYGEN SATURATION: 97 % | HEIGHT: 59 IN

## 2022-06-07 DIAGNOSIS — Z79.52 LONG TERM CURRENT USE OF SYSTEMIC STEROIDS: ICD-10-CM

## 2022-06-07 DIAGNOSIS — E11.9 DIABETES MELLITUS TYPE 2, DIET-CONTROLLED (HCC): Primary | ICD-10-CM

## 2022-06-07 DIAGNOSIS — I10 PRIMARY HYPERTENSION: ICD-10-CM

## 2022-06-07 LAB
ALBUMIN UR QL STRIP: 10 MG/L
CREATININE, URINE POC: 50 MG/DL
HBA1C MFR BLD HPLC: 7.2 % (ref 4.8–5.6)
MICROALBUMIN/CREAT RATIO POC: NORMAL MG/G

## 2022-06-07 PROCEDURE — G8510 SCR DEP NEG, NO PLAN REQD: HCPCS | Performed by: INTERNAL MEDICINE

## 2022-06-07 PROCEDURE — 1101F PT FALLS ASSESS-DOCD LE1/YR: CPT | Performed by: INTERNAL MEDICINE

## 2022-06-07 PROCEDURE — 82044 UR ALBUMIN SEMIQUANTITATIVE: CPT | Performed by: INTERNAL MEDICINE

## 2022-06-07 PROCEDURE — G8417 CALC BMI ABV UP PARAM F/U: HCPCS | Performed by: INTERNAL MEDICINE

## 2022-06-07 PROCEDURE — G8753 SYS BP > OR = 140: HCPCS | Performed by: INTERNAL MEDICINE

## 2022-06-07 PROCEDURE — G8427 DOCREV CUR MEDS BY ELIG CLIN: HCPCS | Performed by: INTERNAL MEDICINE

## 2022-06-07 PROCEDURE — 3051F HG A1C>EQUAL 7.0%<8.0%: CPT | Performed by: INTERNAL MEDICINE

## 2022-06-07 PROCEDURE — G8536 NO DOC ELDER MAL SCRN: HCPCS | Performed by: INTERNAL MEDICINE

## 2022-06-07 PROCEDURE — G8399 PT W/DXA RESULTS DOCUMENT: HCPCS | Performed by: INTERNAL MEDICINE

## 2022-06-07 PROCEDURE — 83036 HEMOGLOBIN GLYCOSYLATED A1C: CPT | Performed by: INTERNAL MEDICINE

## 2022-06-07 PROCEDURE — 1123F ACP DISCUSS/DSCN MKR DOCD: CPT | Performed by: INTERNAL MEDICINE

## 2022-06-07 PROCEDURE — 1090F PRES/ABSN URINE INCON ASSESS: CPT | Performed by: INTERNAL MEDICINE

## 2022-06-07 PROCEDURE — 99214 OFFICE O/P EST MOD 30 MIN: CPT | Performed by: INTERNAL MEDICINE

## 2022-06-07 PROCEDURE — G8754 DIAS BP LESS 90: HCPCS | Performed by: INTERNAL MEDICINE

## 2022-06-07 RX ORDER — VALSARTAN 80 MG/1
80 TABLET ORAL DAILY
Qty: 90 TABLET | Refills: 1 | Status: SHIPPED | OUTPATIENT
Start: 2022-06-07

## 2022-06-07 RX ORDER — PAROXETINE 10 MG/1
TABLET, FILM COATED ORAL
Qty: 135 TABLET | Refills: 1 | Status: SHIPPED | OUTPATIENT
Start: 2022-06-07

## 2022-06-07 RX ORDER — PREDNISONE 5 MG/1
TABLET ORAL
Qty: 30 TABLET | Refills: 0 | OUTPATIENT
Start: 2022-06-07

## 2022-06-07 NOTE — PATIENT INSTRUCTIONS

## 2022-06-07 NOTE — PROGRESS NOTES
RM 3    Chief Complaint   Patient presents with    Diabetes     follow-up       Visit Vitals  BP (!) 160/76 (BP 1 Location: Left upper arm, BP Patient Position: Sitting, BP Cuff Size: Adult)   Pulse 60   Temp 98 °F (36.7 °C) (Oral)   Resp 16   Ht 4' 11\" (1.499 m)   Wt 146 lb 9.6 oz (66.5 kg)   SpO2 97%   BMI 29.61 kg/m²       3 most recent PHQ Screens 6/7/2022   Little interest or pleasure in doing things Not at all   Feeling down, depressed, irritable, or hopeless Not at all   Total Score PHQ 2 0   Trouble falling or staying asleep, or sleeping too much -   Feeling tired or having little energy -   Poor appetite, weight loss, or overeating -   Feeling bad about yourself - or that you are a failure or have let yourself or your family down -   Trouble concentrating on things such as school, work, reading, or watching TV -   Moving or speaking so slowly that other people could have noticed; or the opposite being so fidgety that others notice -   Thoughts of being better off dead, or hurting yourself in some way -   PHQ 9 Score -   How difficult have these problems made it for you to do your work, take care of your home and get along with others -         1. Have you been to the ER, urgent care clinic since your last visit? Hospitalized since your last visit? No    2. Have you seen or consulted any other health care providers outside of the [address] [place] [address] since your last visit? Include any pap smears or colon screening.  No    Health Maintenance Due   Topic Date Due    Eye Exam Retinal or Dilated  09/29/2017    COVID-19 Vaccine (3 - Booster for Pfizer series) 09/21/2021    Foot Exam Q1  12/07/2021    MICROALBUMIN Q1  06/07/2022       Learning Assessment 3/31/2014   PRIMARY LEARNER Patient   HIGHEST LEVEL OF EDUCATION - PRIMARY LEARNER  4 YEARS OF COLLEGE   BARRIERS PRIMARY LEARNER NONE   CO-LEARNER CAREGIVER No   PRIMARY LANGUAGE ENGLISH   LEARNER PREFERENCE PRIMARY READING     PICTURES   ANSWERED BY Ivory Mishra   RELATIONSHIP SELF         AVS  education, follow up, and recommendations provided and addressed with patient.  services used to advise patient. no

## 2022-06-07 NOTE — PROGRESS NOTES
A/P:  Dio Hamilton is a 67 y.o. female, she presents today for:    1. Diabetes mellitus type 2, diet-controlled (HCC)  -     AMB POC HEMOGLOBIN A1C  -     AMB POC URINE, MICROALBUMIN, SEMIQUANT (3 RESULTS)  -      DIABETES FOOT EXAM  2. Long term current use of systemic steroids  3. Primary hypertension  -     valsartan (DIOVAN) 80 mg tablet; Take 1 Tablet by mouth daily. , Normal, Disp-90 Tablet, R-1    DMII: historically well controlled with diet alone. However recently worsened, steroid use as well as increased weight and age.    - eye: following with Dr. Bill[facility]. No new complications. May be getting catract surgery in the futre.    - add metformin. And now on steroids, using glimeperide prn.    - discussed addition of sglt2 as microalbumin elevated. - patient would be open - but only to make one medication chage at a time and arb is being added. HTN: BP remaining elevated today on 2.5 mg amlodipine - add arb, given diabetes. - follow-up BP. RAMANDEEP: reviewed today. Notes that she is feeling overall well with paxil at 10 mg. Denies significant symptoms of anxiety.     PVCs, PACs, atrial tachycardia/SVT - well controlled on Toprol XL 25mg daily - following with cardiology annually    Probable RA - polyarthritis - family history of crohn's disease.    - pain has been more severe since getting steroid injections in wrist and knee. Most notable in knee and right shoulder.    - started on low dose prednisone - glimeperide added for blood sugar management. - has established with rheum - Acacia TENORIO - ddx osteoarthritis vs RA + osteoarthritis. Recommend start of allopurinol, and to continue low dose stareoid at this time.      Left lower extremity edema - new with travel - doppler done 5/12/2022 - no sign of clot   - today patient reports wearing compression socks and swelling has improved.      Future Appointments   Date Time Provider Dre Lu   10/10/2022 10:00 AM Lady Vogel, SAMANTHA SANTIAGO BS AMB       HPI    Patient with osteoarthritis in right knee. Patient swelling has improved. PMH/PSH: reviewed and updated  Sochx/Famhx: reviewed and updated     All: Allergies   Allergen Reactions    Pcn [Penicillins] Rash, Swelling and Unknown (comments)    Sulfa (Sulfonamide Antibiotics) Unknown (comments)     Med:   Current Outpatient Medications   Medication Sig    lancets 28 gauge misc CHECK BLOOD SUGAR ONE TIME DAILY    cyanocobalamin (Vitamin B-12) 1,000 mcg tablet Take 1,000 mcg by mouth daily.  glimepiride (AMARYL) 1 mg tablet Take 1 Tablet by mouth Daily (before breakfast). If blood sugar over 150.  predniSONE (DELTASONE) 5 mg tablet Take 1 Tablet by mouth daily.  metoprolol succinate (TOPROL-XL) 25 mg XL tablet TAKE 1 TABLET EVERY DAY    metFORMIN (GLUCOPHAGE) 500 mg tablet Take 1 Tablet by mouth two (2) times daily (with meals).  amLODIPine (NORVASC) 2.5 mg tablet Take 1 Tablet by mouth daily.  PARoxetine (PAXIL) 10 mg tablet Take 1.5 Tablets by mouth daily.  glucose blood VI test strips (True Metrix Glucose Test Strip) strip USE TO CHECK BLOOD SUGAR ONE TIME DAILY    Lactobacillus acidophilus (PROBIOTIC PO) Take  by mouth.  cholecalciferol (VITAMIN D3) 400 unit tab tablet Take  by mouth daily.  MULTIVITAMIN PO Take  by mouth as directed.  aspirin 81 mg tablet Take 81 mg by mouth every other day.  CALCIUM PO Take 600 mg by mouth daily.  acetaminophen (Tylenol Arthritis Pain) 650 mg TbER Take 650 mg by mouth every eight (8) hours. (Patient not taking: Reported on 6/7/2022)     No current facility-administered medications for this visit. ROS pertinent for the following:  Review of Systems   Constitutional: Negative for chills, fever and malaise/fatigue. Respiratory: Negative for shortness of breath. Cardiovascular: Negative for chest pain.        PE:  Blood pressure (!) 160/76, pulse 60, temperature 98 °F (36.7 °C), temperature source Oral, resp. rate 16, height 4' 11\" (1.499 m), weight 146 lb 9.6 oz (66.5 kg), SpO2 97 %. Body mass index is 29.61 kg/m². Physical Exam  Vitals reviewed. Constitutional:       Appearance: Normal appearance. HENT:      Head: Normocephalic. Eyes:      Pupils: Pupils are equal, round, and reactive to light. Cardiovascular:      Rate and Rhythm: Normal rate and regular rhythm. Pulses: Normal pulses. Heart sounds: Normal heart sounds. Pulmonary:      Effort: Pulmonary effort is normal.      Breath sounds: Normal breath sounds. Musculoskeletal:      Cervical back: Normal range of motion. Comments: Edema 1 + bilaterally in ankles. Neurological:      Mental Status: She is alert and oriented to person, place, and time. Psychiatric:         Mood and Affect: Mood normal.         Behavior: Behavior normal.         Thought Content: Thought content normal.       Labs:   See addendum for interpretation of labs resulting after time of visit. Results for orders placed or performed in visit on 06/07/22   AMB POC HEMOGLOBIN A1C   Result Value Ref Range    Hemoglobin A1c (POC) 7.2 (A) 4.8 - 5.6 %   AMB POC URINE, MICROALBUMIN, SEMIQUANT (3 RESULTS)   Result Value Ref Range    ALBUMIN, URINE POC 10 Negative mg/L    CREATININE, URINE POC 50 mg/dL    Microalbumin/creat ratio (POC)  <30 MG/G     She was given AVS and expressed understanding with the diagnosis and plan as discussed. An electronic signature was used to authenticate this note.   -- Marino Delvalle MD

## 2022-06-08 DIAGNOSIS — E11.9 DIABETES MELLITUS TYPE 2, DIET-CONTROLLED (HCC): ICD-10-CM

## 2022-06-08 RX ORDER — METFORMIN HYDROCHLORIDE 500 MG/1
500 TABLET ORAL 2 TIMES DAILY WITH MEALS
Qty: 180 TABLET | Refills: 1 | Status: SHIPPED | OUTPATIENT
Start: 2022-06-08

## 2022-06-27 DIAGNOSIS — I10 ESSENTIAL HYPERTENSION: ICD-10-CM

## 2022-06-27 RX ORDER — AMLODIPINE BESYLATE 2.5 MG/1
TABLET ORAL
Qty: 90 TABLET | Refills: 1 | Status: SHIPPED | OUTPATIENT
Start: 2022-06-27

## 2022-06-27 NOTE — TELEPHONE ENCOUNTER
Refilled per vo per NP    Future Appointments   Date Time Provider Dre Lu   10/10/2022 10:00 AM Teofilo Sewell Ahr, NP PAMELA BS AMB

## 2022-06-28 ENCOUNTER — CLINICAL SUPPORT (OUTPATIENT)
Dept: INTERNAL MEDICINE CLINIC | Age: 77
End: 2022-06-28

## 2022-06-28 VITALS — DIASTOLIC BLOOD PRESSURE: 67 MMHG | SYSTOLIC BLOOD PRESSURE: 143 MMHG

## 2022-06-28 DIAGNOSIS — I10 PRIMARY HYPERTENSION: Primary | ICD-10-CM

## 2022-06-28 NOTE — PROGRESS NOTES
Patient presents for blood pressure verfication. Has not yet started the valsartan medication. Taking metoprolol and amlodipine.      Lowest blood pressure at home 519 systolic - up to 6 [place] Lalo, MD

## 2022-07-28 DIAGNOSIS — E11.9 DIABETES MELLITUS TYPE 2, DIET-CONTROLLED (HCC): ICD-10-CM

## 2022-07-28 RX ORDER — CALCIUM CITRATE/VITAMIN D3 200MG-6.25
TABLET ORAL
Qty: 100 STRIP | Refills: 3 | Status: SHIPPED | OUTPATIENT
Start: 2022-07-28 | End: 2022-11-02

## 2022-09-20 ENCOUNTER — APPOINTMENT (OUTPATIENT)
Dept: GENERAL RADIOLOGY | Age: 77
End: 2022-09-20
Attending: EMERGENCY MEDICINE
Payer: MEDICARE

## 2022-09-20 ENCOUNTER — HOSPITAL ENCOUNTER (EMERGENCY)
Age: 77
Discharge: HOME | End: 2022-09-20
Attending: EMERGENCY MEDICINE
Payer: MEDICARE

## 2022-09-20 ENCOUNTER — TELEPHONE (OUTPATIENT)
Dept: CARDIOLOGY CLINIC | Age: 77
End: 2022-09-20

## 2022-09-20 VITALS
HEIGHT: 58 IN | OXYGEN SATURATION: 97 % | TEMPERATURE: 98.5 F | HEART RATE: 61 BPM | RESPIRATION RATE: 16 BRPM | DIASTOLIC BLOOD PRESSURE: 68 MMHG | WEIGHT: 145 LBS | SYSTOLIC BLOOD PRESSURE: 140 MMHG | BODY MASS INDEX: 30.44 KG/M2

## 2022-09-20 DIAGNOSIS — R07.9 CHEST PAIN, UNSPECIFIED TYPE: Primary | ICD-10-CM

## 2022-09-20 LAB
ALBUMIN SERPL-MCNC: 3.5 G/DL (ref 3.5–5)
ALBUMIN/GLOB SERPL: 0.9 {RATIO} (ref 1.1–2.2)
ALP SERPL-CCNC: 64 U/L (ref 45–117)
ALT SERPL-CCNC: 21 U/L (ref 12–78)
ANION GAP SERPL CALC-SCNC: 5 MMOL/L (ref 5–15)
AST SERPL-CCNC: 14 U/L (ref 15–37)
ATRIAL RATE: 58 BPM
BASOPHILS # BLD: 0.1 K/UL (ref 0–0.1)
BASOPHILS NFR BLD: 1 % (ref 0–1)
BILIRUB SERPL-MCNC: 0.4 MG/DL (ref 0.2–1)
BUN SERPL-MCNC: 18 MG/DL (ref 6–20)
BUN/CREAT SERPL: 20 (ref 12–20)
CALCIUM SERPL-MCNC: 9.6 MG/DL (ref 8.5–10.1)
CALCULATED P AXIS, ECG09: 87 DEGREES
CALCULATED R AXIS, ECG10: 51 DEGREES
CALCULATED T AXIS, ECG11: 27 DEGREES
CHLORIDE SERPL-SCNC: 101 MMOL/L (ref 97–108)
CO2 SERPL-SCNC: 31 MMOL/L (ref 21–32)
CREAT SERPL-MCNC: 0.91 MG/DL (ref 0.55–1.02)
DIAGNOSIS, 93000: NORMAL
DIFFERENTIAL METHOD BLD: NORMAL
EOSINOPHIL # BLD: 0.2 K/UL (ref 0–0.4)
EOSINOPHIL NFR BLD: 3 % (ref 0–7)
ERYTHROCYTE [DISTWIDTH] IN BLOOD BY AUTOMATED COUNT: 13.8 % (ref 11.5–14.5)
GLOBULIN SER CALC-MCNC: 3.7 G/DL (ref 2–4)
GLUCOSE SERPL-MCNC: 111 MG/DL (ref 65–100)
HCT VFR BLD AUTO: 42.1 % (ref 35–47)
HGB BLD-MCNC: 13.6 G/DL (ref 11.5–16)
IMM GRANULOCYTES # BLD AUTO: 0 K/UL (ref 0–0.04)
IMM GRANULOCYTES NFR BLD AUTO: 0 % (ref 0–0.5)
LYMPHOCYTES # BLD: 1.2 K/UL (ref 0.8–3.5)
LYMPHOCYTES NFR BLD: 16 % (ref 12–49)
MCH RBC QN AUTO: 30.4 PG (ref 26–34)
MCHC RBC AUTO-ENTMCNC: 32.3 G/DL (ref 30–36.5)
MCV RBC AUTO: 94.2 FL (ref 80–99)
MONOCYTES # BLD: 0.8 K/UL (ref 0–1)
MONOCYTES NFR BLD: 11 % (ref 5–13)
NEUTS SEG # BLD: 5.2 K/UL (ref 1.8–8)
NEUTS SEG NFR BLD: 69 % (ref 32–75)
NRBC # BLD: 0 K/UL (ref 0–0.01)
NRBC BLD-RTO: 0 PER 100 WBC
P-R INTERVAL, ECG05: 152 MS
PLATELET # BLD AUTO: 400 K/UL (ref 150–400)
PMV BLD AUTO: 9.2 FL (ref 8.9–12.9)
POTASSIUM SERPL-SCNC: 4.2 MMOL/L (ref 3.5–5.1)
PROT SERPL-MCNC: 7.2 G/DL (ref 6.4–8.2)
Q-T INTERVAL, ECG07: 438 MS
QRS DURATION, ECG06: 104 MS
QTC CALCULATION (BEZET), ECG08: 429 MS
RBC # BLD AUTO: 4.47 M/UL (ref 3.8–5.2)
SODIUM SERPL-SCNC: 137 MMOL/L (ref 136–145)
TROPONIN-HIGH SENSITIVITY: 10 NG/L (ref 0–51)
TROPONIN-HIGH SENSITIVITY: 9 NG/L (ref 0–51)
VENTRICULAR RATE, ECG03: 58 BPM
WBC # BLD AUTO: 7.5 K/UL (ref 3.6–11)

## 2022-09-20 PROCEDURE — 85025 COMPLETE CBC W/AUTO DIFF WBC: CPT

## 2022-09-20 PROCEDURE — 80053 COMPREHEN METABOLIC PANEL: CPT

## 2022-09-20 PROCEDURE — 36415 COLL VENOUS BLD VENIPUNCTURE: CPT

## 2022-09-20 PROCEDURE — 99285 EMERGENCY DEPT VISIT HI MDM: CPT

## 2022-09-20 PROCEDURE — 84484 ASSAY OF TROPONIN QUANT: CPT

## 2022-09-20 PROCEDURE — 71046 X-RAY EXAM CHEST 2 VIEWS: CPT

## 2022-09-20 PROCEDURE — 93005 ELECTROCARDIOGRAM TRACING: CPT

## 2022-09-20 NOTE — TELEPHONE ENCOUNTER
Patient's  called, he called 911 last night because pt was having jaw pain and indigestion, when EMS arrived they checked pt's vitals and they were all with in normal limits but they did not do an EKG, She ended up not going to the ED and signed a waiver but they told her\" to check in with her doctor tomorrow\". She is a patient of Ryan Rushing and used to be a patient of Dr Echo Lyon, I let him know that we do not have any new patient appointments today but she could try and call her PCP or go to the [facility]. He said that he was going to take her to the [place] ED.  I made an appointment with Dr Wanda Inman as a new patient to get established with an MD,  will call if appointment not needed

## 2022-09-20 NOTE — ED PROVIDER NOTES
Date of Service:  9/20/2022    Patient:  Enzo Pan    Chief Complaint:  Chest Pain (Angina)       HPI:  Enzo Pan is a 67 y.o.  female who presents for evaluation of chest discomfort. Patient has intermittent chest pain when she wakes up in the middle of the night. Last night it was a 9 out of 10 burning sensation up into her jaw. Currently no discomfort. She is concerned that it may be gastric reflux however given the fact that she has never had it this bad she was concerned for her heart. EMS evaluated her last night and she chose not to come to the ER. She is here today to be checked. No current symptoms whatsoever. She does endorse drinking water immediately before going to bed.        Past Medical History:   Diagnosis Date    Anxiety     began with menopause    Basal cell carcinoma of skin of face     Bronchitis 03/09/2019    Pt [address]st [address] [facility]    Colon polyps     Dr Mensah, January 2015 normal colonoscopy, need  year follow up    Diabetes mellitus type 2, diet-controlled ([facility] [address] 9/1/2015    Family history of skin cancer     sister- melanoma; son- several BCC    Hypercholesterolemia     Hypertension     IGT (impaired glucose tolerance)     pre diabetic    Osteopenia july 2015    PVC (premature ventricular contraction) February 2016    Dr Mary Huffman    Skin cancer March 2013    [facility], left cheek, Dr. Rios/Selina [id number] evaluation    Sun-damaged skin     As a child and teen pt had bad sunburns    Sunburn, blistering        Past Surgical History:   Procedure Laterality Date    ENDOSCOPY, COLON, DIAGNOSTIC  12/09    5 years    HX CARPAL TUNNEL RELEASE      bilat    HX POLYPECTOMY  2000,2005    colon         Family History:   Problem Relation Age of Onset    Cancer Mother 39        uterine    Stroke Mother     Dementia Mother     Other Father         Parkinsons    Crohn's Disease Daughter     Dementia Maternal Aunt         all 5 mat aunts    Colon Cancer Maternal Grandmother     Colon Cancer Paternal Grandfather        Social History     Socioeconomic History    Marital status:      Spouse name: Not on file    Number of children: Not on file    Years of education: Not on file    Highest education level: Not on file   Occupational History    Not on file   Tobacco Use    Smoking status: Never     Passive exposure: Never    Smokeless tobacco: Never   Vaping Use    Vaping Use: Never used   Substance and Sexual Activity    Alcohol use: Yes     Alcohol/week: 0.0 - 1.0 standard drinks     Comment: rare    Drug use: No    Sexual activity: Never     Partners: Male   Other Topics Concern    Not on file   Social History Narrative    Not on file     Social Determinants of Health     Financial Resource Strain: Not on file   Food Insecurity: Not on file   Transportation Needs: Not on file   Physical Activity: Not on file   Stress: Not on file   Social Connections: Not on file   Intimate Partner Violence: Not on file   Housing Stability: Not on file         ALLERGIES: Pcn [penicillins] and Sulfa (sulfonamide antibiotics)    Review of Systems   Cardiovascular:  Positive for chest pain. All other systems reviewed and are negative. Vitals:    09/20/22 1256   BP: (!) 140/68   Pulse: 61   Resp: 16   Temp: 98.5 °F (36.9 °C)   SpO2: 97%   Weight: 65.8 kg (145 lb)   Height: 4' 10\" (1.473 m)            Physical Exam  Vitals and nursing note reviewed. Constitutional:       Appearance: Normal appearance. HENT:      Head: Normocephalic and atraumatic. Nose: Nose normal.      Mouth/Throat:      Mouth: Mucous membranes are moist.   Cardiovascular:      Rate and Rhythm: Normal rate and regular rhythm. Pulses: Normal pulses. Heart sounds: No murmur heard. Pulmonary:      Effort: Pulmonary effort is normal.      Breath sounds: Normal breath sounds. Abdominal:      General: Abdomen is flat. Musculoskeletal:         General: No deformity. Skin:     General: Skin is warm.       Capillary Refill: Capillary refill takes less than 2 seconds. Neurological:      Mental Status: She is alert and oriented to person, place, and time. Psychiatric:         Mood and Affect: Mood normal.        [facility]    ED Course as of 09/20/22 1910   Tue Sep 20, 2022   1305 EKG 1303  Sinus bradycardia 58 bpm with normal axis. Slightly increased QRS. No STEMI [GG]      ED Course User Index  [GG] Daniel Guzman,      VITAL SIGNS:  No data found. LABS:  Recent Results (from the past 6 hour(s))   TROPONIN-HIGH SENSITIVITY    Collection Time: 09/20/22  3:11 PM   Result Value Ref Range    Troponin-High Sensitivity 10 0 - 51 ng/L        IMAGING:  XR CHEST PA LAT   Final Result   No acute process. Medications During Visit:  Medications   mylanta/viscous lidocaine (GI COCKTAIL) (has no administration in time range)         DECISION MAKING:  Shady Coats is a 67 y.o. female who comes in as above. Well-appearing patient. No chest discomfort now. Based on this description of symptoms, concern for probable reflux type symptoms. Instructed patient to use Pepcid and decreased water and p.o. intake immediately before bed      IMPRESSION:  1. Chest pain, unspecified type        DISPOSITION:  Discharged      Discharge Medication List as of 9/20/2022  3:54 PM           Follow-up Information       Follow up With Specialties Details Why Contact Info    Connie Jacob MD Internal Medicine Physician Schedule an appointment as soon as possible for a visit   [place] [id number] [phone]                The patient is asked to follow-up with their primary care provider in the next several days. They are to call tomorrow for an appointment. The patient is asked to return promptly for any increased concerns or worsening of symptoms. They can return to this emergency department or any other emergency department.       Procedures

## 2022-09-20 NOTE — ED TRIAGE NOTES
Patient arrives with c/o chest burning that woke her out of her sleep last night around midnight. Patient called 911 and Chantel checked vitals but no 12 lead was done. Patient was told by [facility] that hospitals were full and it would be best for her to wait it out till the morning. Patient called cardiologist this morning and was told to go to the ER. Has appointment with cardiologist tomorrow.

## 2022-09-22 DIAGNOSIS — I49.3 PVC (PREMATURE VENTRICULAR CONTRACTION): ICD-10-CM

## 2022-09-22 DIAGNOSIS — I10 ESSENTIAL HYPERTENSION: Primary | ICD-10-CM

## 2022-09-22 DIAGNOSIS — I47.1 ATRIAL TACHYCARDIA (HCC): ICD-10-CM

## 2022-09-22 RX ORDER — METOPROLOL SUCCINATE 25 MG/1
TABLET, EXTENDED RELEASE ORAL
Qty: 90 TABLET | Refills: 2 | Status: SHIPPED | OUTPATIENT
Start: 2022-09-22

## 2022-09-22 NOTE — TELEPHONE ENCOUNTER
Refilled per VO per NP    Future Appointments   Date Time Provider Dre Lu   11/2/2022 10:00 AM Diefenderfer, Arline Harada, NP CAVREY BS AMB

## 2022-09-22 NOTE — TELEPHONE ENCOUNTER
Patient is calling to request a refill for Metoprolol 25mg. Pt will be out of medication soon. Please Advise.      Pharmacy Verified     [phone]

## 2022-11-01 NOTE — PROGRESS NOTES
ZE[facility] [place]:   [phone]    HPI: Colt Rueda, a 67y.o. year-old who presents for follow up regarding her PVCs, atrial tachycardia. She is feeling well  BP well controlled at home   She denies any palpitations or chest pain however she has had two episodes of \"severe indigestion\"  The last episode was so bad her  called 9-11 and EMS came but she felt better and did not go to ER  The next day she went to ER to get checked out and was told everything was ok  Review of ECG from that day shows possible new inferior Q waves, troponin negative x 2  She believes it was indigestion, says she likes spicy food and it's possible that she had spicy food that evening  This occurred in September, no other episodes since  She denies any increased fatigue  No dyspnea with exertion or PND  No lightheadedness or dizziness  She mentions having RA followed by rheumatology, on low dose prednisone  Complains about scattered ecchymosis, takes ASA every other day, no other unusual bleeding or bruising  Discussed how the prednisone and ASA may be contributing to her bruising  Her exercise continues to be limited by orthopedic issues but she does the best she can and overall she is doing pretty well    Call cell phone with test results - does not use [employer]      Assessment/Plan:    Chest pain - atypical, with abnormal ECG and multiple risk factors for CAD will proceed with lexiscan nuclear stress test to assess for ischemia  2. PVCs, PACs, atrial tachycardia/SVT - well controlled on Toprol XL 25mg daily  -loop monitor in 8/18 without arrhythmias  -K 4.3, Mg 2.1  -she will follow up with me again in 1 year if stress test is normal   3. Dyspnea with exertion - none currently, no ischemia on stress test 8/18, echo ok 8/18   4. HTN- well controlled on amlodipine 2.5mg daily and Toprol XL 25mg daily    5. Dyslipidemia-  in 3/22, diet controlled for now, ca score of zero in 2016  6. Anxiety - on paxil    7.  DM Type 2 - A1c 7.2%, management per PCP    8. Vitamin D deficiency - level 38.2    9. Rheumatoid arthritis - on prednisone, managed by rheumatology     Loop Monitor 8/18 - no arrhythmias  Echo 9/18 - LV mildly dilated, LVEF 63%, no WMA, LA mildly dilated. Nuc Stress 8/18 - no ischemia, LVEF 64%  Coronary calcium scan 2016 - zero   Stress Echo 9/15 exe 7:00 anteroseptal and apical inferior hypokinesis,   Holter 9/15 PVC, PAC, atach 133    Soc no tob no etoh  Fhx:  cancer, CVA no early CAD    She  has a past medical history of Anxiety, Basal cell carcinoma of skin of face, Bronchitis (03/09/2019), Colon polyps, Diabetes mellitus type 2, diet-controlled ([facility] [address] (9/1/2015), Family history of skin cancer, Hypercholesterolemia, Hypertension, IGT (impaired glucose tolerance), Osteopenia (july 2015), PVC (premature ventricular contraction) (February 2016), Skin cancer (March 2013), Sun-damaged skin, and Sunburn, blistering. She has no past medical history of Arsenic suspected exposure, Radiation exposure, or Tanning bed exposure. Cardiovascular ROS: negative for palpitations    Respiratory ROS: negative for - cough or orthopnea  Neurological ROS: no TIA or stroke symptoms  All other systems negative except as above. PE  Vitals:    11/02/22 1011   BP: 138/68   Pulse: 62   Resp: 16   SpO2: 98%   Weight: 147 lb 3.2 oz (66.8 kg)   Height: 4' 10\" (1.473 m)      Body mass index is 30.76 kg/m².    General appearance - alert, well appearing, and in no distress  Mental status - affect appropriate to mood  Eyes - sclera anicteric, moist mucous membranes  Neck - supple, no carotid bruits   Lymphatics - not assessed   Chest - clear to auscultation, no wheezes, rales or rhonchi  Heart - bradycardic, regular rhythm, normal S1, S2, no murmurs, rubs, clicks or gallops  Abdomen - soft, nontender, nondistended  Back exam - full range of motion, no tenderness  Neurological - cranial nerves II through XII grossly intact, no focal deficit  Musculoskeletal - no muscular tenderness noted, normal strength  Extremities - peripheral pulses normal, no pedal edema  Skin - normal coloration  no rashes    12 lead ECG: NSR, possible new inferior Q waves     Recent Labs:  Lab Results   Component Value Date/Time    Cholesterol, total 212 (H) 03/07/2022 11:52 AM    HDL Cholesterol 81 03/07/2022 11:52 AM    LDL, calculated 106.6 (H) 03/07/2022 11:52 AM    Triglyceride 122 03/07/2022 11:52 AM    CHOL/HDL Ratio 2.6 03/07/2022 11:52 AM     Lab Results   Component Value Date/Time    Creatinine 0.91 09/20/2022 01:02 PM     Lab Results   Component Value Date/Time    BUN 18 09/20/2022 01:02 PM     Lab Results   Component Value Date/Time    Potassium 4.2 09/20/2022 01:02 PM     Lab Results   Component Value Date/Time    Hemoglobin A1c 6.9 (H) 12/13/2021 01:54 PM     Lab Results   Component Value Date/Time    HGB 13.6 09/20/2022 01:02 PM     Lab Results   Component Value Date/Time    PLATELET 827 49/30/9569 01:02 PM       Reviewed:  Past Medical History:   Diagnosis Date    Anxiety     began with menopause    Basal cell carcinoma of skin of face     Bronchitis 03/09/2019    Pt 1st [address] [facility]    Colon polyps     Dr Tim Toscano, January 2015 normal colonoscopy, need  year follow up    Diabetes mellitus type 2, diet-controlled ([facility] [address] 9/1/2015    Family history of skin cancer     sister- melanoma; son- several BCC    Hypercholesterolemia     Hypertension     IGT (impaired glucose tolerance)     pre diabetic    Osteopenia july 2015    PVC (premature ventricular contraction) February 2016    Dr Yokasta Perez    Skin cancer March 2013    [address], left cheek, Dr. Rios/Selina [id number] evaluation    Sun-damaged skin     As a child and teen pt had bad sunburns    Sunburn, blistering      Social History     Tobacco Use   Smoking Status Never    Passive exposure: Never   Smokeless Tobacco Never     Social History     Substance and Sexual Activity   Alcohol Use Not Currently     Allergies Allergen Reactions    Pcn [Penicillins] Rash, Swelling and Unknown (comments)    Sulfa (Sulfonamide Antibiotics) Unknown (comments)       Current Outpatient Medications   Medication Sig    hydrOXYchloroQUINE (PLAQUENIL) 200 mg tablet Take 200 mg by mouth two (2) times a day. metoprolol succinate (TOPROL-XL) 25 mg XL tablet TAKE 1 TABLET EVERY DAY    amLODIPine (NORVASC) 2.5 mg tablet TAKE ONE TABLET BY MOUTH ONE TIME DAILY    metFORMIN (GLUCOPHAGE) 500 mg tablet Take 1 Tablet by mouth two (2) times daily (with meals). PARoxetine (PAXIL) 10 mg tablet TAKE 1 AND 1/2 TABLETS EVERY DAY    valsartan (DIOVAN) 80 mg tablet Take 1 Tablet by mouth daily. cyanocobalamin 1,000 mcg tablet Take 1,000 mcg by mouth daily. glimepiride (AMARYL) 1 mg tablet Take 1 Tablet by mouth Daily (before breakfast). If blood sugar over 150. predniSONE (DELTASONE) 5 mg tablet Take 1 Tablet by mouth daily. acetaminophen (TYLENOL) 650 mg TbER Take 650 mg by mouth every eight (8) hours. Lactobacillus acidophilus (PROBIOTIC PO) Take  by mouth daily. MULTIVITAMIN PO Take  by mouth as directed. aspirin 81 mg tablet Take 81 mg by mouth every other day. CALCIUM PO Take 600 mg by mouth daily. No current facility-administered medications for this visit.        Darian Norton NP  [facility] [place]  [address]

## 2022-11-02 ENCOUNTER — OFFICE VISIT (OUTPATIENT)
Dept: CARDIOLOGY CLINIC | Age: 77
End: 2022-11-02
Payer: MEDICARE

## 2022-11-02 VITALS
HEIGHT: 58 IN | OXYGEN SATURATION: 98 % | HEART RATE: 62 BPM | WEIGHT: 147.2 LBS | BODY MASS INDEX: 30.9 KG/M2 | RESPIRATION RATE: 16 BRPM | SYSTOLIC BLOOD PRESSURE: 138 MMHG | DIASTOLIC BLOOD PRESSURE: 68 MMHG

## 2022-11-02 DIAGNOSIS — R94.31 ABNORMAL ECG: ICD-10-CM

## 2022-11-02 DIAGNOSIS — I10 ESSENTIAL HYPERTENSION: ICD-10-CM

## 2022-11-02 DIAGNOSIS — R07.9 CHEST PAIN, UNSPECIFIED TYPE: Primary | ICD-10-CM

## 2022-11-02 DIAGNOSIS — I49.3 PVC (PREMATURE VENTRICULAR CONTRACTION): ICD-10-CM

## 2022-11-02 DIAGNOSIS — E78.5 DYSLIPIDEMIA: ICD-10-CM

## 2022-11-02 DIAGNOSIS — I47.1 ATRIAL TACHYCARDIA (HCC): ICD-10-CM

## 2022-11-02 PROCEDURE — G8432 DEP SCR NOT DOC, RNG: HCPCS | Performed by: NURSE PRACTITIONER

## 2022-11-02 PROCEDURE — 1101F PT FALLS ASSESS-DOCD LE1/YR: CPT | Performed by: NURSE PRACTITIONER

## 2022-11-02 PROCEDURE — G8399 PT W/DXA RESULTS DOCUMENT: HCPCS | Performed by: NURSE PRACTITIONER

## 2022-11-02 PROCEDURE — 1123F ACP DISCUSS/DSCN MKR DOCD: CPT | Performed by: NURSE PRACTITIONER

## 2022-11-02 PROCEDURE — G8427 DOCREV CUR MEDS BY ELIG CLIN: HCPCS | Performed by: NURSE PRACTITIONER

## 2022-11-02 PROCEDURE — 3078F DIAST BP <80 MM HG: CPT | Performed by: NURSE PRACTITIONER

## 2022-11-02 PROCEDURE — G8754 DIAS BP LESS 90: HCPCS | Performed by: NURSE PRACTITIONER

## 2022-11-02 PROCEDURE — 99214 OFFICE O/P EST MOD 30 MIN: CPT | Performed by: NURSE PRACTITIONER

## 2022-11-02 PROCEDURE — G8752 SYS BP LESS 140: HCPCS | Performed by: NURSE PRACTITIONER

## 2022-11-02 PROCEDURE — 3074F SYST BP LT 130 MM HG: CPT | Performed by: NURSE PRACTITIONER

## 2022-11-02 PROCEDURE — 1090F PRES/ABSN URINE INCON ASSESS: CPT | Performed by: NURSE PRACTITIONER

## 2022-11-02 PROCEDURE — G8536 NO DOC ELDER MAL SCRN: HCPCS | Performed by: NURSE PRACTITIONER

## 2022-11-02 PROCEDURE — G8417 CALC BMI ABV UP PARAM F/U: HCPCS | Performed by: NURSE PRACTITIONER

## 2022-11-02 RX ORDER — HYDROXYCHLOROQUINE SULFATE 200 MG/1
200 TABLET, FILM COATED ORAL 2 TIMES DAILY
COMMUNITY
Start: 2022-10-12

## 2022-11-02 NOTE — PATIENT INSTRUCTIONS
[facility] primary care - Dr. Nelsy Levin - [phone]    If your stress test is normal you may stop taking aspirin

## 2022-12-01 ENCOUNTER — ANCILLARY PROCEDURE (OUTPATIENT)
Dept: CARDIOLOGY CLINIC | Age: 77
End: 2022-12-01
Payer: MEDICARE

## 2022-12-01 VITALS
DIASTOLIC BLOOD PRESSURE: 78 MMHG | BODY MASS INDEX: 30.86 KG/M2 | HEIGHT: 58 IN | WEIGHT: 147 LBS | SYSTOLIC BLOOD PRESSURE: 138 MMHG

## 2022-12-01 DIAGNOSIS — E78.5 DYSLIPIDEMIA: ICD-10-CM

## 2022-12-01 DIAGNOSIS — R07.9 CHEST PAIN, UNSPECIFIED TYPE: ICD-10-CM

## 2022-12-01 DIAGNOSIS — I10 ESSENTIAL HYPERTENSION: ICD-10-CM

## 2022-12-01 DIAGNOSIS — I49.3 PVC (PREMATURE VENTRICULAR CONTRACTION): ICD-10-CM

## 2022-12-01 RX ORDER — TETRAKIS(2-METHOXYISOBUTYLISOCYANIDE)COPPER(I) TETRAFLUOROBORATE 1 MG/ML
10 INJECTION, POWDER, LYOPHILIZED, FOR SOLUTION INTRAVENOUS ONCE
Status: COMPLETED | OUTPATIENT
Start: 2022-12-01 | End: 2022-12-01

## 2022-12-01 RX ORDER — TETRAKIS(2-METHOXYISOBUTYLISOCYANIDE)COPPER(I) TETRAFLUOROBORATE 1 MG/ML
30 INJECTION, POWDER, LYOPHILIZED, FOR SOLUTION INTRAVENOUS ONCE
Status: COMPLETED | OUTPATIENT
Start: 2022-12-01 | End: 2022-12-01

## 2022-12-01 RX ADMIN — TETRAKIS(2-METHOXYISOBUTYLISOCYANIDE)COPPER(I) TETRAFLUOROBORATE 8.2 MILLICURIE: 1 INJECTION, POWDER, LYOPHILIZED, FOR SOLUTION INTRAVENOUS at 09:51

## 2022-12-01 RX ADMIN — TETRAKIS(2-METHOXYISOBUTYLISOCYANIDE)COPPER(I) TETRAFLUOROBORATE 25.6 MILLICURIE: 1 INJECTION, POWDER, LYOPHILIZED, FOR SOLUTION INTRAVENOUS at 11:02

## 2022-12-02 LAB
NUC STRESS EJECTION FRACTION: 72 %
STRESS BASELINE DIAS BP: 78 MMHG
STRESS BASELINE HR: 55 BPM
STRESS BASELINE ST DEPRESSION: 0 MM
STRESS BASELINE SYS BP: 138 MMHG
STRESS O2 SAT PEAK: 100 %
STRESS O2 SAT REST: 100 %
STRESS PEAK DIAS BP: 80 MMHG
STRESS PEAK SYS BP: 140 MMHG
STRESS PERCENT HR ACHIEVED: 56 %
STRESS POST PEAK HR: 80 BPM
STRESS RATE PRESSURE PRODUCT: NORMAL BPM*MMHG
STRESS TARGET HR: 142 BPM

## 2022-12-05 ENCOUNTER — TELEPHONE (OUTPATIENT)
Dept: CARDIOLOGY CLINIC | Age: 77
End: 2022-12-05

## 2022-12-05 NOTE — TELEPHONE ENCOUNTER
Patient is calling for her nuclear stress test results. Patient request to leave a vm with results because she will be out.       [phone]

## 2022-12-05 NOTE — PROGRESS NOTES
Please call patient, she does not use MYChart    Please let the patient know that her stress test did show some abnormalities. The imaging looks like she may have had a silent heart attack in the past but there was no evidence of blocked arteries feeding viable heart muscle at this time. She has a fixed defect in the mid to distal anterior wall and the apex. Her heart function is normal and the walls of her heart move normally but the nuclear imaging suggests that she has had a previous heart attack. Because I saw her for chest pain and an abnormal EKG and we talked about the risk of coronary artery disease with rheumatoid arthritis I discussed her case with one of our physicians and he recommended that we proceed with a coronary CT angiogram to evaluate her coronary arteries further. This is less invasive than a cardiac catheterization. It is basically a CT scan with a dye injection to evaluate the coronary arteries and look for stenosis.   If she would like to proceed with this test I will place an order for it and we will give her instructions regarding preparation for the test.

## 2022-12-09 ENCOUNTER — TELEPHONE (OUTPATIENT)
Dept: CARDIOLOGY CLINIC | Age: 77
End: 2022-12-09

## 2022-12-09 NOTE — TELEPHONE ENCOUNTER
SAMANTHA Mcfarlane, RN  Please call patient, she does not use MYChart     Please let the patient know that her stress test did show some abnormalities. The imaging looks like she may have had a silent heart attack in the past but there was no evidence of blocked arteries feeding viable heart muscle at this time. She has a fixed defect in the mid to distal anterior wall and the apex. Her heart function is normal and the walls of her heart move normally but the nuclear imaging suggests that she has had a previous heart attack. Because I saw her for chest pain and an abnormal EKG and we talked about the risk of coronary artery disease with rheumatoid arthritis I discussed her case with one of our physicians and he recommended that we proceed with a coronary CT angiogram to evaluate her coronary arteries further. This is less invasive than a cardiac catheterization. It is basically a CT scan with a dye injection to evaluate the coronary arteries and look for stenosis.   If she would like to proceed with this test I will place an order for it and we will give her instructions regarding preparation for the test.      Called pt and there was no answer, I left message asking for return call

## 2022-12-09 NOTE — TELEPHONE ENCOUNTER
SAMANTHA Gallegos Chi, RN  Please call patient, she does not use MYChart     Please let the patient know that her stress test did show some abnormalities. The imaging looks like she may have had a silent heart attack in the past but there was no evidence of blocked arteries feeding viable heart muscle at this time. She has a fixed defect in the mid to distal anterior wall and the apex. Her heart function is normal and the walls of her heart move normally but the nuclear imaging suggests that she has had a previous heart attack. Because I saw her for chest pain and an abnormal EKG and we talked about the risk of coronary artery disease with rheumatoid arthritis I discussed her case with one of our physicians and he recommended that we proceed with a coronary CT angiogram to evaluate her coronary arteries further. This is less invasive than a cardiac catheterization. It is basically a CT scan with a dye injection to evaluate the coronary arteries and look for stenosis. If she would like to proceed with this test I will place an order for it and we will give her instructions regarding preparation for the test.\      Tala Rojas NP  You 4 days ago     KD  See my results message to you. If she wants to proceed with coronary CTA we have to give her instructions and an Rx for atenolol to take prior. We'll do that if she agrees.    Corazon   Left message asking for return call

## 2022-12-09 NOTE — TELEPHONE ENCOUNTER
SAMANTHA Antoine, RN  Please call patient, she does not use MYChart     Please let the patient know that her stress test did show some abnormalities. The imaging looks like she may have had a silent heart attack in the past but there was no evidence of blocked arteries feeding viable heart muscle at this time. She has a fixed defect in the mid to distal anterior wall and the apex. Her heart function is normal and the walls of her heart move normally but the nuclear imaging suggests that she has had a previous heart attack. Because I saw her for chest pain and an abnormal EKG and we talked about the risk of coronary artery disease with rheumatoid arthritis I discussed her case with one of our physicians and he recommended that we proceed with a coronary CT angiogram to evaluate her coronary arteries further. This is less invasive than a cardiac catheterization. It is basically a CT scan with a dye injection to evaluate the coronary arteries and look for stenosis. If she would like to proceed with this test I will place an order for it and we will give her instructions regarding preparation for the test.\        Yvan Orlando NP  You 4 days ago      KD  See my results message to you. If she wants to proceed with coronary CTA we have to give her instructions and an Rx for atenolol to take prior. We'll do that if she agrees.    [employer] with pt and verified 2 pt identifiers, gave patient the above information and she would like to speak with her  and will call back

## 2022-12-19 NOTE — TELEPHONE ENCOUNTER
Spoke with pt and verified 2 pt identifiers, pt stated that she wants to come to her Jan 6 appointment and speak Gabe Prince and get more inforamtion and tehn she can make a more educated decision, I let her know that I will let Weyman Olszewski know

## 2023-01-06 ENCOUNTER — OFFICE VISIT (OUTPATIENT)
Dept: CARDIOLOGY CLINIC | Age: 78
End: 2023-01-06
Payer: MEDICARE

## 2023-01-06 VITALS
OXYGEN SATURATION: 97 % | DIASTOLIC BLOOD PRESSURE: 68 MMHG | HEART RATE: 76 BPM | BODY MASS INDEX: 30.23 KG/M2 | SYSTOLIC BLOOD PRESSURE: 136 MMHG | HEIGHT: 58 IN | WEIGHT: 144 LBS | RESPIRATION RATE: 16 BRPM

## 2023-01-06 DIAGNOSIS — R07.9 CHEST PAIN, UNSPECIFIED TYPE: ICD-10-CM

## 2023-01-06 DIAGNOSIS — E78.5 DYSLIPIDEMIA: ICD-10-CM

## 2023-01-06 DIAGNOSIS — I10 ESSENTIAL HYPERTENSION: ICD-10-CM

## 2023-01-06 DIAGNOSIS — I47.1 ATRIAL TACHYCARDIA (HCC): ICD-10-CM

## 2023-01-06 DIAGNOSIS — I49.3 PVC (PREMATURE VENTRICULAR CONTRACTION): ICD-10-CM

## 2023-01-06 DIAGNOSIS — R94.39 ABNORMAL NUCLEAR STRESS TEST: Primary | ICD-10-CM

## 2023-01-06 RX ORDER — ATENOLOL 25 MG/1
25 TABLET ORAL 2 TIMES DAILY
Qty: 2 TABLET | Refills: 0 | Status: SHIPPED | OUTPATIENT
Start: 2023-01-06

## 2023-01-06 RX ORDER — VALSARTAN 80 MG/1
80 TABLET ORAL DAILY
Qty: 90 TABLET | Refills: 3 | Status: SHIPPED | OUTPATIENT
Start: 2023-01-06

## 2023-01-06 RX ORDER — LANCETS 28 GAUGE
EACH MISCELLANEOUS
COMMUNITY
Start: 2022-12-12

## 2023-01-06 RX ORDER — CALCIUM CITRATE/VITAMIN D3 200MG-6.25
TABLET ORAL
COMMUNITY
Start: 2022-11-10

## 2023-01-06 NOTE — PATIENT INSTRUCTIONS
Someone will call you to schedule your coronary CT angiogram  If you do not hear from them please call [phone]    You will take atenolol 25mg the night before your test and 25mg the morning of your test along with your other medications

## 2023-01-06 NOTE — PROGRESS NOTES
ZEINAB [place]:   [phone]    HPI: Rosalina Cheadle, a 65y.o. year-old who presents for follow up regarding abnormal stress test, PVCs, atrial tachycardia. She denies any palpitations or chest pain however she had two episodes of \"severe indigestion\" as described during her last visit   ECG showed possible new inferior Q waves, troponin negative x 2  She denies any increased fatigue  No dyspnea with exertion or PND  No lightheadedness or dizziness  Discussed abnormal stress test results suggesting possibility of silent MI   Given the fact that she has several risk factors for CAD discussed her case with one of our physicians and he recommended that we proceed with a coronary CT angiogram to evaluate her coronary arteries further. This is less invasive than a cardiac catheterization. Discussed this with her again today and she is agreeable to proceeding. She will be out of town 1/20 - 2/10/23 so she may not proceed with it until after she returns from vacation    Call cell phone with test results - does not use [facility]      Assessment/Plan: 1. Chest pain -   None currently  On ASA every other day, Toprol XL, not on statin currently   -Nuclear Stress 12/22- EF 74%,cannot rule out a small degree of myocardial infarction. There is a moderate severity left ventricular stress perfusion defect that is small to medium in size present in the mid to distal anterior and apex segment(s) that is predominantly fixed. There is normal wall motion in the defect area. The possibility of infarction cannot be excluded. -Will proceed with coronary CTA to further evaluate coronary arteries, sent Rx for atenolol 25mg to her pharmacy and instructed her on use surrounding coronary CTA  -She will follow up with me again after her testing is complete and at that time she will decide which cardiologist she would like to follow with, alternating visits with me    2.  PVCs, PACs, atrial tachycardia/SVT - well controlled on Toprol XL 25mg daily  -loop monitor in 8/18 without arrhythmias    3. Dyspnea with exertion - none currently, echo ok 8/18, stress test suggests possible silent MI/CAD, will proceed with coronary CTA as above    4. HTN- well controlled on amlodipine 2.5mg daily,Toprol XL 25mg, and Valsartan 80mg daily      5. Dyslipidemia-  in 3/22, diet controlled for now, ca score of zero in 2016    6. Anxiety - on paxil      7. DM Type 2 - A1c 7.2%, management per PCP      8. Vitamin D deficiency - level 38.2      9. Rheumatoid arthritis - on prednisone, managed by rheumatology     Nuclear Stress 12/22- EF 74%,cannot rule out a small degree of myocardial infarction. There is a moderate severity left ventricular stress perfusion defect that is small to medium in size present in the mid to distal anterior and apex segment(s) that is predominantly fixed. There is normal wall motion in the defect area. The possibility of infarction cannot be excluded. Loop Monitor 8/18 - no arrhythmias  Echo 9/18 - LV mildly dilated, LVEF 63%, no WMA, LA mildly dilated. Nuc Stress 8/18 - no ischemia, LVEF 64%  Coronary calcium scan 2016 - zero   Stress Echo 9/15 exe 7:00 anteroseptal and apical inferior hypokinesis,   Holter 9/15 PVC, PAC, atach 133    NUCLEAR CARDIAC STRESS TEST 12/02/2022 12/9/2022    Interpretation Summary    Nuclear Findings: The study cannot rule out a small degree of myocardial infarction. There is a moderate severity left ventricular stress perfusion defect that is small to medium in size present in the mid to distal anterior and apex segment(s) that is predominantly fixed. There is normal wall motion in the defect area. The possibility of infarction cannot be excluded. Nuclear Findings: Normal left ventricular systolic function post-stress. Nuclear Findings: The study is most consistent with myocardial infarction. ECG: Resting ECG demonstrates normal sinus rhythm.     ECG: Stress ECG was borderline for diagnosis. Stress Test: A pharmacological stress test was performed using lexiscan. Hemodynamics are adequate for diagnosis. Blood pressure demonstrated a normal response and heart rate demonstrated a normal response to stress. The patient's heart rate recovery was normal.    Post-stress ejection fraction is 72%. Stress Combined Conclusion: The study is most consistent with myocardial infarction distal anterior wall and apex. given low quality study. cannot r/o attenuation artifact no gross ischemia    Signed by: Shaan Hough MD on 12/2/2022  3:12 PM     Soc no tob no etoh  Fhx:  cancer, CVA no early CAD    She  has a past medical history of Anxiety, Basal cell carcinoma of skin of face, Bronchitis (03/09/2019), Colon polyps, Diabetes mellitus type 2, diet-controlled ([address] (9/1/2015), Family history of skin cancer, Hypercholesterolemia, Hypertension, IGT (impaired glucose tolerance), Osteopenia (july 2015), PVC (premature ventricular contraction) (February 2016), Skin cancer (March 2013), Sun-damaged skin, and Sunburn, blistering. She has no past medical history of Arsenic suspected exposure, Radiation exposure, or Tanning bed exposure. Cardiovascular ROS: negative for palpitations    Respiratory ROS: negative for - cough or orthopnea  Neurological ROS: no TIA or stroke symptoms  All other systems negative except as above. PE  Vitals:    01/06/23 1353   BP: 136/68   Pulse: 76   Resp: 16   SpO2: 97%   Weight: 144 lb (65.3 kg)   Height: 4' 10\" (1.473 m)      Body mass index is 30.1 kg/m².    General appearance - alert, well appearing, and in no distress  Mental status - affect appropriate to mood  Neck - supple, no carotid bruits   Chest - clear to auscultation, no wheezes, rales or rhonchi  Heart - bradycardic, regular rhythm, normal S1, S2, no murmurs, rubs, clicks or gallops  Abdomen - soft, nontender, nondistended  Back exam - full range of motion, no tenderness  Neurological - cranial nerves II through XII grossly intact, no focal deficit  Musculoskeletal - no muscular tenderness noted, normal strength  Extremities - peripheral pulses normal, no pedal edema  Skin - normal coloration  no rashes      Recent Labs:  Lab Results   Component Value Date/Time    Cholesterol, total 212 (H) 03/07/2022 11:52 AM    HDL Cholesterol 81 03/07/2022 11:52 AM    LDL, calculated 106.6 (H) 03/07/2022 11:52 AM    Triglyceride 122 03/07/2022 11:52 AM    CHOL/HDL Ratio 2.6 03/07/2022 11:52 AM     Lab Results   Component Value Date/Time    Creatinine 0.91 09/20/2022 01:02 PM     Lab Results   Component Value Date/Time    BUN 18 09/20/2022 01:02 PM     Lab Results   Component Value Date/Time    Potassium 4.2 09/20/2022 01:02 PM     Lab Results   Component Value Date/Time    Hemoglobin A1c 6.9 (H) 12/13/2021 01:54 PM     Lab Results   Component Value Date/Time    HGB 13.6 09/20/2022 01:02 PM     Lab Results   Component Value Date/Time    PLATELET 786 60/71/7227 01:02 PM       Reviewed:  Past Medical History:   Diagnosis Date    Anxiety     began with menopause    Basal cell carcinoma of skin of face     Bronchitis 03/09/2019    Pt 1st [facility]    Colon polyps     Dr Marylene Sons, January 2015 normal colonoscopy, need  year follow up    Diabetes mellitus type 2, diet-controlled ([facility] [address] 9/1/2015    Family history of skin cancer     sister- melanoma; son- several BCC    Hypercholesterolemia     Hypertension     IGT (impaired glucose tolerance)     pre diabetic    Osteopenia july 2015    PVC (premature ventricular contraction) February 2016    Dr Arshad Poag    Skin cancer March 2013    [facility], left cheek, Dr. Rios/Selina [id number] evaluation    Sun-damaged skin     As a child and teen pt had bad sunburns    Sunburn, blistering      Social History     Tobacco Use   Smoking Status Never    Passive exposure: Never   Smokeless Tobacco Never     Social History     Substance and Sexual Activity   Alcohol Use Not Currently     Allergies   Allergen Reactions    Pcn [Penicillins] Rash, Swelling and Unknown (comments)    Sulfa (Sulfonamide Antibiotics) Unknown (comments)       Current Outpatient Medications   Medication Sig    True Metrix Glucose Test Strip strip     lancets 28 gauge misc     valsartan (DIOVAN) 80 mg tablet Take 1 Tablet by mouth daily. hydrOXYchloroQUINE (PLAQUENIL) 200 mg tablet Take 200 mg by mouth two (2) times a day. metoprolol succinate (TOPROL-XL) 25 mg XL tablet TAKE 1 TABLET EVERY DAY    amLODIPine (NORVASC) 2.5 mg tablet TAKE ONE TABLET BY MOUTH ONE TIME DAILY    metFORMIN (GLUCOPHAGE) 500 mg tablet Take 1 Tablet by mouth two (2) times daily (with meals). PARoxetine (PAXIL) 10 mg tablet TAKE 1 AND 1/2 TABLETS EVERY DAY    cyanocobalamin 1,000 mcg tablet Take 1,000 mcg by mouth daily. glimepiride (AMARYL) 1 mg tablet Take 1 Tablet by mouth Daily (before breakfast). If blood sugar over 150. predniSONE (DELTASONE) 5 mg tablet Take 1 Tablet by mouth daily. acetaminophen (TYLENOL) 650 mg TbER Take 650 mg by mouth every eight (8) hours. Lactobacillus acidophilus (PROBIOTIC PO) Take  by mouth daily. MULTIVITAMIN PO Take  by mouth as directed. aspirin 81 mg tablet Take 81 mg by mouth every other day. CALCIUM PO Take 600 mg by mouth daily. No current facility-administered medications for this visit.      RAYMON Bernal, [facility]   [address], [address] [facility] [address]  [facility], [address] [place] [address]  (O) [phone] (JAN) [phone]

## 2023-01-10 ENCOUNTER — OFFICE VISIT (OUTPATIENT)
Dept: PRIMARY CARE CLINIC | Age: 78
End: 2023-01-10
Payer: MEDICARE

## 2023-01-10 VITALS
WEIGHT: 147 LBS | OXYGEN SATURATION: 95 % | TEMPERATURE: 97.1 F | SYSTOLIC BLOOD PRESSURE: 145 MMHG | RESPIRATION RATE: 16 BRPM | HEART RATE: 53 BPM | DIASTOLIC BLOOD PRESSURE: 72 MMHG | HEIGHT: 58 IN | BODY MASS INDEX: 30.86 KG/M2

## 2023-01-10 DIAGNOSIS — R80.9 TYPE 2 DIABETES MELLITUS WITH MICROALBUMINURIA, WITHOUT LONG-TERM CURRENT USE OF INSULIN (HCC): ICD-10-CM

## 2023-01-10 DIAGNOSIS — E78.00 PURE HYPERCHOLESTEROLEMIA: ICD-10-CM

## 2023-01-10 DIAGNOSIS — M06.9 RHEUMATOID ARTHRITIS, INVOLVING UNSPECIFIED SITE, UNSPECIFIED WHETHER RHEUMATOID FACTOR PRESENT (HCC): ICD-10-CM

## 2023-01-10 DIAGNOSIS — F41.9 ANXIETY: ICD-10-CM

## 2023-01-10 DIAGNOSIS — E11.29 TYPE 2 DIABETES MELLITUS WITH MICROALBUMINURIA, WITHOUT LONG-TERM CURRENT USE OF INSULIN (HCC): ICD-10-CM

## 2023-01-10 DIAGNOSIS — E11.65 TYPE 2 DIABETES MELLITUS WITH HYPERGLYCEMIA, WITHOUT LONG-TERM CURRENT USE OF INSULIN (HCC): Primary | ICD-10-CM

## 2023-01-10 DIAGNOSIS — I10 PRIMARY HYPERTENSION: ICD-10-CM

## 2023-01-10 PROCEDURE — 1123F ACP DISCUSS/DSCN MKR DOCD: CPT | Performed by: FAMILY MEDICINE

## 2023-01-10 PROCEDURE — 99204 OFFICE O/P NEW MOD 45 MIN: CPT | Performed by: FAMILY MEDICINE

## 2023-01-10 PROCEDURE — 3078F DIAST BP <80 MM HG: CPT | Performed by: FAMILY MEDICINE

## 2023-01-10 PROCEDURE — G8399 PT W/DXA RESULTS DOCUMENT: HCPCS | Performed by: FAMILY MEDICINE

## 2023-01-10 PROCEDURE — G8510 SCR DEP NEG, NO PLAN REQD: HCPCS | Performed by: FAMILY MEDICINE

## 2023-01-10 PROCEDURE — 1090F PRES/ABSN URINE INCON ASSESS: CPT | Performed by: FAMILY MEDICINE

## 2023-01-10 PROCEDURE — G8536 NO DOC ELDER MAL SCRN: HCPCS | Performed by: FAMILY MEDICINE

## 2023-01-10 PROCEDURE — 1101F PT FALLS ASSESS-DOCD LE1/YR: CPT | Performed by: FAMILY MEDICINE

## 2023-01-10 PROCEDURE — G8427 DOCREV CUR MEDS BY ELIG CLIN: HCPCS | Performed by: FAMILY MEDICINE

## 2023-01-10 PROCEDURE — G8417 CALC BMI ABV UP PARAM F/U: HCPCS | Performed by: FAMILY MEDICINE

## 2023-01-10 PROCEDURE — 3077F SYST BP >= 140 MM HG: CPT | Performed by: FAMILY MEDICINE

## 2023-01-10 RX ORDER — ACETAMINOPHEN 500 MG
TABLET ORAL DAILY
COMMUNITY

## 2023-01-10 NOTE — PROGRESS NOTES
HPI     Chief Complaint   Patient presents with    [facility]     She is a 65 y.o. female who presents for [facility]. HTN - Currently on Norvasc 2.5mg daily, Metoprolol 25mg XL daily, Valsartan 80mg daily. Checks BP at home and typically runs good. Has had a few episodes of chest pain last episode she called EMS for and went to the ER. She followed up with Cardiology Magan Hernandez NP. Had a stress test that was abnormal and have ordered some imaging to look into this further. Rheumatoid Arthritis - Currently on Plaquenil daily. Followed by Dr. Marcelino Cloud for Rheum at [facility]. DM2 - Currently on Metformin 500mg BID, Glimperide 1mg daily PRN BG >150. Anxiety - Currently on Paxil daily. Has been on this since she started menopause. No SI/ HI. She sees [facility] for R knee pain. She gets shots of cortisone in her knee as needed. She needs a new handicap placard today. Review of Systems   Respiratory:  Negative for shortness of breath. Cardiovascular:  Negative for chest pain. Psychiatric/Behavioral:  Negative for suicidal ideas. The patient is nervous/anxious. Reviewed PmHx, RxHx, FmHx, SocHx, AllgHx and updated and dated in the chart. Physical Exam:  Visit Vitals  BP (!) 145/72   Pulse (!) 53   Temp 97.1 °F (36.2 °C) (Temporal)   Resp 16   Ht 4' 10\" (1.473 m)   Wt 147 lb (66.7 kg)   SpO2 95%   BMI 30.72 kg/m²     Physical Exam  Vitals and nursing note reviewed. Constitutional:       General: She is not in acute distress. Appearance: Normal appearance. She is not ill-appearing. Cardiovascular:      Rate and Rhythm: Regular rhythm. Bradycardia present. Heart sounds: No murmur heard. Pulmonary:      Effort: Pulmonary effort is normal. No respiratory distress. Breath sounds: Normal breath sounds. Neurological:      General: No focal deficit present. Mental Status: She is alert.    Psychiatric:         Mood and Affect: Mood normal.         Behavior: Behavior normal.     No results found for this or any previous visit (from the past 12 hour(s)). Assessment / Plan     Diagnoses and all orders for this visit:    1. Type 2 diabetes mellitus with hyperglycemia, without long-term current use of insulin (HCC)  -     METABOLIC PANEL, BASIC; Future  -     HEMOGLOBIN A1C WITH EAG; Future    2. Type 2 diabetes mellitus with microalbuminuria, without long-term current use of insulin (HCC)  -     METABOLIC PANEL, BASIC; Future  -     MICROALBUMIN, UR, RAND W/ MICROALB/CREAT RATIO; Future    3. Pure hypercholesterolemia  -     LIPID PANEL; Future    4. Primary hypertension    5. Rheumatoid arthritis, involving unspecified site, unspecified whether rheumatoid factor present ([facility] [address]    6. Anxiety     BP high today. States she has high BP in doctor's office but normal at home. Continue to monitor. Check labs. Follow up in 6 months if labs okay or 3 months if labs are abnormal.  Given handicap form for 6 months. I have discussed the diagnosis with the patient and the intended plan as seen in the above orders. The patient has received an after-visit summary and questions were answered concerning future plans. I have discussed medication side effects and warnings with the patient as well.     Kerry Aguero,

## 2023-01-10 NOTE — PROGRESS NOTES
Chief Complaint   Patient presents with    Establish Care      Identified pt with two pt identifiers(name and ). Chief Complaint   Patient presents with    Establish Care        3 most recent [facility] 36 Screens 1/10/2023   Little interest or pleasure in doing things Not at all   Feeling down, depressed, irritable, or hopeless Not at all   Total Score PHQ 2 0   Trouble falling or staying asleep, or sleeping too much Several days   Feeling tired or having little energy Several days   Poor appetite, weight loss, or overeating Not at all   Feeling bad about yourself - or that you are a failure or have let yourself or your family down Not at all   Trouble concentrating on things such as school, work, reading, or watching TV Not at all   Moving or speaking so slowly that other people could have noticed; or the opposite being so fidgety that others notice Not at all   Thoughts of being better off dead, or hurting yourself in some way Not at all   PHQ 9 Score 2   How difficult have these problems made it for you to do your work, take care of your home and get along with others -        There were no vitals filed for this visit. Health Maintenance Due   Topic Date Due    Eye Exam Retinal or Dilated  2017    COVID-19 Vaccine (3 - Booster for Pfizer series) 2021    Flu Vaccine (1) 2022    A1C test (Diabetic or Prediabetic)  2022    Medicare Yearly Exam  2022        1. Have you been to the ER, urgent care clinic since your last visit? Hospitalized since your last visit? No    2. Have you seen or consulted any other health care providers outside of the [address] [place] [address] since your last visit? Include any pap smears or colon screening. Yes Dr. Maddy Dykes     3. For patients aged 39-70: Has the patient had a colonoscopy / FIT/ Cologuard? No      If the patient is female:    4. For patients aged 41-77: Has the patient had a mammogram within the past 2 years? NA - based on age or sex      11.  For patients aged 21-65: Has the patient had a pap smear?  NA - based on age or sex

## 2023-01-11 ENCOUNTER — TELEPHONE (OUTPATIENT)
Dept: CARDIOLOGY CLINIC | Age: 78
End: 2023-01-11

## 2023-01-11 NOTE — TELEPHONE ENCOUNTER
Please let her know that I spoke with Dr. Gregoria Colunga about her case since insurance will not cover coronary CTA with current diagnoses and the diagnoses are accurate. There are no other diagnoses that I can include on her case. He recommended we go ahead and proceed with a cardiac catheterization to evaluate for CAD. If she is comfortable proceeding with that please schedule her for a left heart cath with him. If she wants to discuss it further please set her up to see him in the office to discuss.

## 2023-01-12 NOTE — TELEPHONE ENCOUNTER
Spoke with patient. 2 patient identifiers used. Notified patient of Reji Leyv NP's note. Patient states she wants to do some more homework before proceeding. States she just saw PCP and has some testing to complete and that next week she is leaving for 3 weeks to be in [place]. Patient states she will call us back on decision and that she would like CTA appointment cancelled.

## 2023-01-12 NOTE — TELEPHONE ENCOUNTER
Maybe just offer her an appt with Dr. Jade Gu to discuss? She should see him and decide on testing before she sees me 3/7/23.     Future Appointments   Date Time Provider Dre Lu   3/7/2023 10:00 AM Gopal Sewell NP [facility]   11/7/2023 10:00 AM Gopal Sewell NP [facility]

## 2023-02-10 DIAGNOSIS — I10 ESSENTIAL HYPERTENSION: ICD-10-CM

## 2023-02-10 DIAGNOSIS — E11.9 DIABETES MELLITUS TYPE 2, DIET-CONTROLLED (HCC): ICD-10-CM

## 2023-02-10 DIAGNOSIS — F41.1 GAD (GENERALIZED ANXIETY DISORDER): ICD-10-CM

## 2023-02-10 DIAGNOSIS — F33.0 MILD EPISODE OF RECURRENT MAJOR DEPRESSIVE DISORDER (HCC): ICD-10-CM

## 2023-02-10 RX ORDER — VALSARTAN 80 MG/1
80 TABLET ORAL DAILY
Qty: 90 TABLET | Refills: 3 | Status: SHIPPED | OUTPATIENT
Start: 2023-02-10

## 2023-02-10 RX ORDER — PAROXETINE 10 MG/1
15 TABLET, FILM COATED ORAL DAILY
Qty: 135 TABLET | Refills: 1 | Status: SHIPPED | OUTPATIENT
Start: 2023-02-10

## 2023-02-10 RX ORDER — METFORMIN HYDROCHLORIDE 500 MG/1
500 TABLET ORAL 2 TIMES DAILY WITH MEALS
Qty: 180 TABLET | Refills: 1 | Status: SHIPPED | OUTPATIENT
Start: 2023-02-10

## 2023-02-10 NOTE — TELEPHONE ENCOUNTER
Requested Prescriptions     Pending Prescriptions Disp Refills    PARoxetine (PAXIL) 10 mg tablet 135 Tablet 1    valsartan (DIOVAN) 80 mg tablet 90 Tablet 3     Sig: Take 1 Tablet by mouth daily. metFORMIN (GLUCOPHAGE) 500 mg tablet 180 Tablet 1     Sig: Take 1 Tablet by mouth two (2) times daily (with meals).         Last Visit 01/10/2023  Last Refill 06/07/2022, 1/06/2023 (different provider), 06/08/2022 ( diff provider)

## 2023-03-08 ENCOUNTER — OFFICE VISIT (OUTPATIENT)
Dept: CARDIOLOGY CLINIC | Age: 78
End: 2023-03-08
Payer: MEDICARE

## 2023-03-08 VITALS
OXYGEN SATURATION: 96 % | WEIGHT: 147.2 LBS | HEIGHT: 58 IN | HEART RATE: 64 BPM | RESPIRATION RATE: 16 BRPM | DIASTOLIC BLOOD PRESSURE: 54 MMHG | BODY MASS INDEX: 30.9 KG/M2 | SYSTOLIC BLOOD PRESSURE: 138 MMHG

## 2023-03-08 DIAGNOSIS — E11.9 TYPE 2 DIABETES MELLITUS WITHOUT COMPLICATION, WITHOUT LONG-TERM CURRENT USE OF INSULIN (HCC): ICD-10-CM

## 2023-03-08 DIAGNOSIS — E11.22 TYPE 2 DIABETES MELLITUS WITH STAGE 2 CHRONIC KIDNEY DISEASE, WITHOUT LONG-TERM CURRENT USE OF INSULIN (HCC): ICD-10-CM

## 2023-03-08 DIAGNOSIS — I10 ESSENTIAL HYPERTENSION: Primary | ICD-10-CM

## 2023-03-08 DIAGNOSIS — N18.2 TYPE 2 DIABETES MELLITUS WITH STAGE 2 CHRONIC KIDNEY DISEASE, WITHOUT LONG-TERM CURRENT USE OF INSULIN (HCC): ICD-10-CM

## 2023-03-08 PROCEDURE — 3075F SYST BP GE 130 - 139MM HG: CPT | Performed by: INTERNAL MEDICINE

## 2023-03-08 PROCEDURE — G8510 SCR DEP NEG, NO PLAN REQD: HCPCS | Performed by: INTERNAL MEDICINE

## 2023-03-08 PROCEDURE — 99204 OFFICE O/P NEW MOD 45 MIN: CPT | Performed by: INTERNAL MEDICINE

## 2023-03-08 PROCEDURE — G8399 PT W/DXA RESULTS DOCUMENT: HCPCS | Performed by: INTERNAL MEDICINE

## 2023-03-08 PROCEDURE — 1123F ACP DISCUSS/DSCN MKR DOCD: CPT | Performed by: INTERNAL MEDICINE

## 2023-03-08 PROCEDURE — 3078F DIAST BP <80 MM HG: CPT | Performed by: INTERNAL MEDICINE

## 2023-03-08 PROCEDURE — 1090F PRES/ABSN URINE INCON ASSESS: CPT | Performed by: INTERNAL MEDICINE

## 2023-03-08 PROCEDURE — 1101F PT FALLS ASSESS-DOCD LE1/YR: CPT | Performed by: INTERNAL MEDICINE

## 2023-03-08 PROCEDURE — G8417 CALC BMI ABV UP PARAM F/U: HCPCS | Performed by: INTERNAL MEDICINE

## 2023-03-08 PROCEDURE — G8536 NO DOC ELDER MAL SCRN: HCPCS | Performed by: INTERNAL MEDICINE

## 2023-03-08 PROCEDURE — G8427 DOCREV CUR MEDS BY ELIG CLIN: HCPCS | Performed by: INTERNAL MEDICINE

## 2023-03-08 PROCEDURE — 3044F HG A1C LEVEL LT 7.0%: CPT | Performed by: INTERNAL MEDICINE

## 2023-03-08 RX ORDER — ROSUVASTATIN CALCIUM 10 MG/1
10 TABLET, COATED ORAL
Qty: 90 TABLET | Refills: 1 | Status: SHIPPED | OUTPATIENT
Start: 2023-03-08

## 2023-03-08 NOTE — LETTER
3/8/2023    Patient: Moraima Mondragon   YOB: 1945   Date of Visit: 3/8/2023     Lakeshia Shields, [address] [facility] [address]  [place]    Dear Lakeshia Shields DO,      Thank you for referring Ms. Lulu Yi to [address] [place] [address] for evaluation. My notes for this consultation are attached. If you have questions, please do not hesitate to call me. I look forward to following your patient along with you.       Sincerely,    Gianna Arguelles MD

## 2023-03-08 NOTE — PROGRESS NOTES
Dr. Jh Melendez cardiology [facility]. [phone]            Cardiology Consult/Progress Note      Requesting/referring provider: Prabha Valdez DO     Reason for Consult: Establish care    Assessment/Plan:  1. Hypertension  2. Type 2 diabetes mellitus  3. History of SVT with PACs and PVCs  4. Atypical chest discomfort noted late last year with prior history of atypical chest discomfort  5. Hypercholesterolemia    Kris Valencia is seen to establish care with me. History of diabetes mellitus, hypertension and presumed hyperlipidemia but not on statin. She reports some chest discomfort late last year which was associated with symptoms of palpitations. Symptoms were brief lasting seconds to minutes and overall description is not very classic for ischemic discomfort particularly because these were not exercise related episodes. Nonetheless her stress test demonstrated small to moderate sized perfusion defect in anterior wall/apex with overall low to intermediate risk stress test.  Her symptoms however have spontaneously improved over the last 3 months. I recommend initiation of statins with a diagnosis of presumed coronary artery disease. We will start her on rosuvastatin 10 mg daily. If symptoms recur again, may proceed with coronary angiography. However she like to try medical therapy first which is reasonable. It may be reasonable to consider an event monitor to see whether her symptoms of squeezing and tightness in the chest correlate with episodes of SVT which have been previously documented. Recommend continuation of beta-blockade along with statins. Follow-up with SAMANTHA Sewell in November 2023 and in 1 year with me.         Investigations ordered    []    High complexity decision making was performed  []    Patient is at high-risk of decompensation with multiple organ involvement  []    Complex/difficult social determinants of health outcomes  Total of ** minutes were spent on reviewing the records, analyzing investigations and documentation in the chart, on the day of visit including time for examination and time spent with the patient  Investigations personally reviewed and interpreted  Stress test December 2022: Personally reviewed. Normal LV function. Possibly hyperdynamic. Apical fixed perfusion defect of uncertain etiology. Could be artifact. Overall size of perfusion defect is small to moderate and overall stressors appear to be low to intermediate risk. Stress test 2018: Normal perfusion  Investigations reviewed    HPI: Arielle Duque, a 65y.o. year-old who is seen for evaluation of  and management of chest discomfort. She has had history of chest comfort off-and-on for the last few years but in the last few months had noted it in a recurrent fashion associated with some palpitations. In December 2022, she underwent a stress test which appeared to be abnormal.  Previous stress test in 2018 was normal.  Symptoms appear to be in the form of tightness or squeezing sensation in the chest accompanied by transient episodes of palpitation and a feeling of skipped beat at the end of the episode. She  has a past medical history of Anxiety, Basal cell carcinoma of skin of face, Bronchitis (03/09/2019), Colon polyps, Diabetes mellitus type 2, diet-controlled ([facility] [address] (9/1/2015), Family history of skin cancer, Hypercholesterolemia, Hypertension, IGT (impaired glucose tolerance), Osteopenia (july 2015), PVC (premature ventricular contraction) (February 2016), Skin cancer (March 2013), Sun-damaged skin, and Sunburn, blistering. She has no past medical history of Arsenic suspected exposure, Radiation exposure, or Tanning bed exposure. Review of system:Patient reports no dyspnea/PND/Orthpnea. She reports no cough/fever/focal neurological deficits/abdominal pain. All other systems negative except as above.    Family History   Problem Relation Age of Onset    Cancer Mother 39        uterine    Stroke Mother     Dementia Mother     Other Father         Parkinsons    Crohn's Disease Daughter     Dementia Maternal Aunt         all 5 mat aunts    Colon Cancer Maternal Grandmother     Colon Cancer Paternal Grandfather       Social History     Socioeconomic History    Marital status:    Tobacco Use    Smoking status: Never     Passive exposure: Never    Smokeless tobacco: Never   Vaping Use    Vaping Use: Never used   Substance and Sexual Activity    Alcohol use: Not Currently    Drug use: No    Sexual activity: Never     Partners: Male     Social Determinants of Health     Financial Resource Strain: Low Risk     Difficulty of Paying Living Expenses: Not hard at all   Food Insecurity: No Food Insecurity    Worried About [employer] in the Last Year: Never true    [employer] in the Last Year: Never true      PE  Vitals:    03/08/23 1414 03/08/23 1440   BP: (!) 166/78 (!) 138/54   Pulse: 64    Resp: 16    SpO2: 96%    Weight: 147 lb 3.2 oz (66.8 kg)    Height: 4' 10\" (1.473 m)     Body mass index is 30.76 kg/m². General:    Alert, cooperative, no distress. Psychiatric:    Normal Mood and affect    Eye/ENT:      Pupils equal, No asymmetry, Conjunctival pink. Able to hear voice at normal amplitude   Lungs:      Visibly symmetric chest expansion, No palpable tenderness. Clear to auscultation bilaterally. Heart[de-identified]    Regular rate and rhythm, S1, S2 normal, no murmur, click, rub or gallop. No JVD, Normal palpable peripheral pulses. No cyanosis   Abdomen:     Soft, non-tender. Bowel sounds normal. No masses,  No      organomegaly. Extremities:   Extremities normal, atraumatic, no edema. Neurologic:   CN II-XII grossly intact.  No gross focal deficits           Recent Labs:  Lab Results   Component Value Date/Time    Cholesterol, total 204 (H) 01/17/2023 09:07 AM    HDL Cholesterol 105 01/17/2023 09:07 AM    LDL, calculated 83.2 01/17/2023 09:07 AM    Triglyceride 79 01/17/2023 09:07 AM    CHOL/HDL Ratio 1.9 01/17/2023 09:07 AM     Lab Results   Component Value Date/Time    Creatinine 0.78 01/17/2023 09:07 AM     Lab Results   Component Value Date/Time    BUN 21 (H) 01/17/2023 09:07 AM     Lab Results   Component Value Date/Time    Potassium 4.5 01/17/2023 09:07 AM     Lab Results   Component Value Date/Time    Hemoglobin A1c 6.9 (H) 01/17/2023 09:07 AM     Lab Results   Component Value Date/Time    HGB 13.6 09/20/2022 01:02 PM     Lab Results   Component Value Date/Time    PLATELET 297 38/54/8371 01:02 PM       Reviewed:  Past Medical History:   Diagnosis Date    Anxiety     began with menopause    Basal cell carcinoma of skin of face     Bronchitis 03/09/2019    Pt 1st [address]    Colon polyps     Dr Sarah Huntley, January 2015 normal colonoscopy, need  year follow up    Diabetes mellitus type 2, diet-controlled ([facility] [address] 9/1/2015    Family history of skin cancer     sister- melanoma; son- several BCC    Hypercholesterolemia     Hypertension     IGT (impaired glucose tolerance)     pre diabetic    Osteopenia july 2015    PVC (premature ventricular contraction) February 2016    Dr Lauri Collins    Skin cancer March 2013    BCC, left cheek, Dr. Rios/Selina [id number] evaluation    Sun-damaged skin     As a child and teen pt had bad sunburns    Sunburn, blistering      Social History     Tobacco Use   Smoking Status Never    Passive exposure: Never   Smokeless Tobacco Never     Social History     Substance and Sexual Activity   Alcohol Use Not Currently     Allergies   Allergen Reactions    Pcn [Penicillins] Rash, Swelling and Unknown (comments)    Sulfa (Sulfonamide Antibiotics) Unknown (comments)     Family History   Problem Relation Age of Onset    Cancer Mother 39        uterine    Stroke Mother     Dementia Mother     Other Father         Parkinsons    Crohn's Disease Daughter     Dementia Maternal Aunt         all 5 mat aunts    Colon Cancer Maternal Grandmother Colon Cancer Paternal Grandfather         Current Outpatient Medications   Medication Sig    rosuvastatin (CRESTOR) 10 mg tablet Take 1 Tablet by mouth nightly. PARoxetine (PAXIL) 10 mg tablet Take 1.5 Tablets by mouth daily. valsartan (DIOVAN) 80 mg tablet Take 1 Tablet by mouth daily. metFORMIN (GLUCOPHAGE) 500 mg tablet Take 1 Tablet by mouth two (2) times daily (with meals). cholecalciferol (VITAMIN D3) (2,000 UNITS /50 MCG) cap capsule Take  by mouth daily. amLODIPine (NORVASC) 2.5 mg tablet TAKE 1 TABLET EVERY DAY    True Metrix Glucose Test Strip strip     lancets 28 gauge misc     hydrOXYchloroQUINE (PLAQUENIL) 200 mg tablet Take 200 mg by mouth two (2) times a day. metoprolol succinate (TOPROL-XL) 25 mg XL tablet TAKE 1 TABLET EVERY DAY    cyanocobalamin 1,000 mcg tablet Take 1,000 mcg by mouth daily. glimepiride (AMARYL) 1 mg tablet Take 1 Tablet by mouth Daily (before breakfast). If blood sugar over 150. predniSONE (DELTASONE) 5 mg tablet Take 1 Tablet by mouth daily. acetaminophen (TYLENOL) 650 mg TbER Take 650 mg by mouth every eight (8) hours. Lactobacillus acidophilus (PROBIOTIC PO) Take  by mouth daily. MULTIVITAMIN PO Take  by mouth as directed. aspirin 81 mg tablet Take 81 mg by mouth every other day. CALCIUM PO Take 600 mg by mouth daily. No current facility-administered medications for this visit. ATTENTION:   This medical record was transcribed using an electronic medical records/speech recognition system. Although proofread, it may and can contain electronic, spelling and other errors. Corrections may be executed at a later time. Please feel free to contact us for any clarifications as needed.     [employer] [facility] [place]  [facility] [place], [place]. [phone]

## 2023-03-08 NOTE — Clinical Note
3/8/2023    Patient: Jaimie Johnson   YOB: 1945   Date of Visit: 3/8/2023     Rosy Anglin, [address] [address]  [address] [place]    Dear Rosy Anglin DO,      Thank you for referring Ms. Brian Rodrigez to [address] [place] [address] for evaluation. My notes for this consultation are attached. If you have questions, please do not hesitate to call me. I look forward to following your patient along with you.       Sincerely,    Porter Peterson MD

## 2023-03-20 ENCOUNTER — TELEPHONE (OUTPATIENT)
Dept: URGENT CARE | Age: 78
End: 2023-03-20

## 2023-03-20 ENCOUNTER — OFFICE VISIT (OUTPATIENT)
Dept: URGENT CARE | Age: 78
End: 2023-03-20
Payer: MEDICARE

## 2023-03-20 VITALS
DIASTOLIC BLOOD PRESSURE: 74 MMHG | RESPIRATION RATE: 17 BRPM | HEART RATE: 66 BPM | OXYGEN SATURATION: 97 % | WEIGHT: 147.1 LBS | SYSTOLIC BLOOD PRESSURE: 148 MMHG | BODY MASS INDEX: 30.74 KG/M2 | TEMPERATURE: 97.6 F

## 2023-03-20 DIAGNOSIS — R11.0 NAUSEA: ICD-10-CM

## 2023-03-20 DIAGNOSIS — R52 BODY ACHES: Primary | ICD-10-CM

## 2023-03-20 LAB
BILIRUB UR QL STRIP: NEGATIVE
FLUAV+FLUBV AG NOSE QL IA.RAPID: NEGATIVE
FLUAV+FLUBV AG NOSE QL IA.RAPID: NEGATIVE
GLUCOSE UR-MCNC: NEGATIVE MG/DL
KETONES P FAST UR STRIP-MCNC: NEGATIVE MG/DL
LOT NUMBER POC: NORMAL
PH UR STRIP: 5.5 [PH] (ref 4.6–8)
PROT UR QL STRIP: NEGATIVE
SARS-COV-2 PCR, POC: NEGATIVE
SP GR UR STRIP: 1.03 (ref 1–1.03)
UA UROBILINOGEN AMB POC: NORMAL (ref 0.2–1)
URINALYSIS CLARITY POC: NORMAL
URINALYSIS COLOR POC: NORMAL
URINE BLOOD POC: NORMAL
URINE LEUKOCYTES POC: NORMAL
URINE NITRITES POC: NEGATIVE
VALID INTERNAL CONTROL?: YES
VALID INTERNAL CONTROL?: YES

## 2023-03-20 PROCEDURE — G8417 CALC BMI ABV UP PARAM F/U: HCPCS | Performed by: FAMILY MEDICINE

## 2023-03-20 PROCEDURE — G8399 PT W/DXA RESULTS DOCUMENT: HCPCS | Performed by: FAMILY MEDICINE

## 2023-03-20 PROCEDURE — 99202 OFFICE O/P NEW SF 15 MIN: CPT | Performed by: FAMILY MEDICINE

## 2023-03-20 PROCEDURE — 1090F PRES/ABSN URINE INCON ASSESS: CPT | Performed by: FAMILY MEDICINE

## 2023-03-20 PROCEDURE — 3077F SYST BP >= 140 MM HG: CPT | Performed by: FAMILY MEDICINE

## 2023-03-20 PROCEDURE — 1123F ACP DISCUSS/DSCN MKR DOCD: CPT | Performed by: FAMILY MEDICINE

## 2023-03-20 PROCEDURE — 87635 SARS-COV-2 COVID-19 AMP PRB: CPT | Performed by: FAMILY MEDICINE

## 2023-03-20 PROCEDURE — 3078F DIAST BP <80 MM HG: CPT | Performed by: FAMILY MEDICINE

## 2023-03-20 PROCEDURE — G8536 NO DOC ELDER MAL SCRN: HCPCS | Performed by: FAMILY MEDICINE

## 2023-03-20 PROCEDURE — G8432 DEP SCR NOT DOC, RNG: HCPCS | Performed by: FAMILY MEDICINE

## 2023-03-20 PROCEDURE — G8427 DOCREV CUR MEDS BY ELIG CLIN: HCPCS | Performed by: FAMILY MEDICINE

## 2023-03-20 PROCEDURE — 1101F PT FALLS ASSESS-DOCD LE1/YR: CPT | Performed by: FAMILY MEDICINE

## 2023-03-20 PROCEDURE — 87804 INFLUENZA ASSAY W/OPTIC: CPT | Performed by: FAMILY MEDICINE

## 2023-03-20 PROCEDURE — 81003 URINALYSIS AUTO W/O SCOPE: CPT | Performed by: FAMILY MEDICINE

## 2023-03-20 RX ORDER — ONDANSETRON 4 MG/1
4 TABLET, ORALLY DISINTEGRATING ORAL
Qty: 6 TABLET | Refills: 0 | Status: SHIPPED | OUTPATIENT
Start: 2023-03-20

## 2023-03-20 NOTE — TELEPHONE ENCOUNTER
----- Message from Julien Baca MD sent at 3/20/2023 10:16 AM EDT -----  Please notify as follow       Your urine test is trace positive for UTI-  Sine you don't have urinary sxs- frequency/urgency and burning - no need for Rx at this time  Will send for culture- if positive will send AB    If develop urinary sxs asa above call back     Increase your fluid intake

## 2023-03-20 NOTE — PATIENT INSTRUCTIONS
Light diet- more fluids  Tylenol and Zofran ( Rx ) as needed    Results for orders placed or performed in visit on 03/20/23   POCT COVID-19, SARS-COV-2, PCR   Result Value Ref Range    SARS-COV-2 PCR, POC Negative Negative    VALID INTERNAL CONTROL POC Yes     LOT NUMBER     AMB POC ACLI INFLUENZA A/B TEST   Result Value Ref Range    VALID INTERNAL CONTROL POC Yes     Influenza A Ag POC Negative Negative    Influenza B Ag POC Negative Negative

## 2023-03-20 NOTE — PROGRESS NOTES
Please notify as follow       Your urine test is trace positive for UTI-  Sine you don't have urinary sxs- frequency/urgency and burning - no need for Rx at this time  Will send for culture- if positive will send AB    If develop urinary sxs asa above call back     Increase your fluid intake

## 2023-03-20 NOTE — PROGRESS NOTES
Nausea  This is a new problem. The current episode started yesterday. The problem has been gradually improving. Pertinent negatives include no chest pain, no abdominal pain, no headaches and no shortness of breath. Associated symptoms comments: Started last night with some body ache  Feels better now  Wants to check for covid . Nothing aggravates the symptoms. She has tried nothing for the symptoms.       Past Medical History:   Diagnosis Date    Anxiety     began with menopause    Basal cell carcinoma of skin of face     Bronchitis 03/09/2019    Pt 1st [address] [facility]    Colon polyps     Dr Tra Perdue, January 2015 normal colonoscopy, need  year follow up    Diabetes mellitus type 2, diet-controlled ([facility] [address] 9/1/2015    Family history of skin cancer     sister- melanoma; son- several BCC    Hypercholesterolemia     Hypertension     IGT (impaired glucose tolerance)     pre diabetic    Osteopenia july 2015    PVC (premature ventricular contraction) February 2016    Dr Fede Vogel    Skin cancer March 2013    [facility], left cheek, Dr. Rios/Selina [id number] evaluation    Sun-damaged skin     As a child and teen pt had bad sunburns    Sunburn, blistering         Past Surgical History:   Procedure Laterality Date    ENDOSCOPY, COLON, DIAGNOSTIC  12/09    5 years    HX CARPAL TUNNEL RELEASE      bilat    HX POLYPECTOMY  2000,2005    colon         Family History   Problem Relation Age of Onset    Cancer Mother 39        uterine    Stroke Mother     Dementia Mother     Other Father         Parkinsons    Crohn's Disease Daughter     Dementia Maternal Aunt         all 5 mat aunts    Colon Cancer Maternal Grandmother     Colon Cancer Paternal Grandfather         Social History     Socioeconomic History    Marital status:      Spouse name: Not on file    Number of children: Not on file    Years of education: Not on file    Highest education level: Not on file   Occupational History    Not on file   Tobacco Use    Smoking status: Never Passive exposure: Never    Smokeless tobacco: Never   Vaping Use    Vaping Use: Never used   Substance and Sexual Activity    Alcohol use: Not Currently    Drug use: No    Sexual activity: Never     Partners: Male   Other Topics Concern    Not on file   Social History Narrative    Not on file     Social Determinants of Health     Financial Resource Strain: Low Risk     Difficulty of Paying Living Expenses: Not hard at all   Food Insecurity: No Food Insecurity    Worried About Running Out of Food in the Last Year: Never true    Ran Out of Food in the Last Year: Never true   Transportation Needs: Not on file   Physical Activity: Not on file   Stress: Not on file   Social Connections: Not on file   Intimate Partner Violence: Not on file   Housing Stability: Not on file                ALLERGIES: Pcn [penicillins] and Sulfa (sulfonamide antibiotics)    Review of Systems   Constitutional:  Negative for chills. Respiratory:  Negative for shortness of breath. Cardiovascular:  Negative for chest pain. Gastrointestinal:  Positive for nausea. Negative for abdominal pain. Musculoskeletal:  Positive for myalgias. Neurological:  Negative for headaches. All other systems reviewed and are negative. Vitals:    03/20/23 0913 03/20/23 0917   BP: (!) 149/74 (!) 148/74   Pulse: 66    Resp: 17    Temp: 97.6 °F (36.4 °C)    SpO2: 97%    Weight: 147 lb 1.6 oz (66.7 kg)        Physical Exam  Vitals and nursing note reviewed. Constitutional:       General: She is not in acute distress. Appearance: She is not ill-appearing. HENT:      Right Ear: Tympanic membrane and ear canal normal.      Left Ear: Tympanic membrane and ear canal normal.      Nose: Nose normal.      Mouth/Throat:      Pharynx: No oropharyngeal exudate or posterior oropharyngeal erythema. Eyes:      General:         Right eye: No discharge. Left eye: No discharge.       Conjunctiva/sclera: Conjunctivae normal.   Pulmonary:      Effort: Pulmonary effort is normal. No respiratory distress. Breath sounds: Normal breath sounds. No wheezing, rhonchi or rales. Abdominal:      General: There is no distension. Tenderness: There is no abdominal tenderness. There is no guarding. Musculoskeletal:      Cervical back: Neck supple. Lymphadenopathy:      Cervical: No cervical adenopathy. Skin:     Findings: No rash. MDM    Procedures        ICD-10-CM ICD-9-CM    1. Body aches  R52 780.96 POCT COVID-19, SARS-COV-2, PCR      AMB POC CALI INFLUENZA A/B TEST      AMB POC URINALYSIS DIP STICK AUTO W/O MICRO      2. Nausea  R11.0 787.02 AMB POC URINALYSIS DIP STICK AUTO W/O MICRO        Medications Ordered Today   Medications    ondansetron (ZOFRAN ODT) 4 mg disintegrating tablet     Sig: Take 1 Tablet by mouth every eight (8) hours as needed for Nausea or Vomiting. Dispense:  6 Tablet     Refill:  0     Results for orders placed or performed in visit on 03/20/23   POCT COVID-19, SARS-COV-2, PCR   Result Value Ref Range    SARS-COV-2 PCR, POC Negative Negative    VALID INTERNAL CONTROL POC Yes     LOT NUMBER     AMB POC CALI INFLUENZA A/B TEST   Result Value Ref Range    VALID INTERNAL CONTROL POC Yes     Influenza A Ag POC Negative Negative    Influenza B Ag POC Negative Negative     The patients condition was discussed with the patient and they understand. The patient is to follow up with primary care doctor. If signs and symptoms become worse the pt is to go to the ER. The patient is to take medications as prescribed.

## 2023-06-19 ENCOUNTER — TELEPHONE (OUTPATIENT)
Dept: PRIMARY CARE CLINIC | Facility: CLINIC | Age: 78
End: 2023-06-19

## 2023-06-19 NOTE — TELEPHONE ENCOUNTER
Patient took a fall over the weekend and which resulted in a broken nose. She wants to be seen sooner then the next available of June 29th. Went ahead scheduled that day but patient would like to be called by the nurse to reassure that waiting to the time is fine or if she can be seen before then.

## 2023-06-20 RX ORDER — METOPROLOL SUCCINATE 25 MG/1
TABLET, EXTENDED RELEASE ORAL
Qty: 90 TABLET | Refills: 1 | Status: SHIPPED | OUTPATIENT
Start: 2023-06-20

## 2023-06-20 NOTE — TELEPHONE ENCOUNTER
Requested Prescriptions     Signed Prescriptions Disp Refills    metoprolol succinate (TOPROL XL) 25 MG extended release tablet 90 tablet 1     Sig: TAKE 1 TABLET EVERY DAY     Authorizing Provider: Lang Whitley     Ordering User: Aric ELI per MD     Future Appointments   Date Time Provider Marita Kebede   6/29/2023 10:00 AM Thao Valdez DO [facility] BS AMB   11/7/2023 10:00 AM MARYANA Rebolledo NP AMB

## 2023-06-29 ENCOUNTER — OFFICE VISIT (OUTPATIENT)
Dept: PRIMARY CARE CLINIC | Facility: CLINIC | Age: 78
End: 2023-06-29
Payer: MEDICARE

## 2023-06-29 VITALS
OXYGEN SATURATION: 99 % | RESPIRATION RATE: 16 BRPM | BODY MASS INDEX: 29.88 KG/M2 | SYSTOLIC BLOOD PRESSURE: 153 MMHG | WEIGHT: 148.2 LBS | DIASTOLIC BLOOD PRESSURE: 69 MMHG | TEMPERATURE: 97.1 F | HEIGHT: 59 IN | HEART RATE: 55 BPM

## 2023-06-29 DIAGNOSIS — Z00.00 MEDICARE ANNUAL WELLNESS VISIT, SUBSEQUENT: ICD-10-CM

## 2023-06-29 DIAGNOSIS — Z91.81 AT HIGH RISK FOR FALLS: ICD-10-CM

## 2023-06-29 DIAGNOSIS — R94.6 ABNORMAL THYROID EXAM: ICD-10-CM

## 2023-06-29 DIAGNOSIS — E11.9 CONTROLLED TYPE 2 DIABETES MELLITUS WITHOUT COMPLICATION, WITHOUT LONG-TERM CURRENT USE OF INSULIN (HCC): ICD-10-CM

## 2023-06-29 DIAGNOSIS — R26.81 UNSTEADY GAIT: Primary | ICD-10-CM

## 2023-06-29 DIAGNOSIS — Z12.11 SCREENING FOR COLON CANCER: ICD-10-CM

## 2023-06-29 PROCEDURE — 3077F SYST BP >= 140 MM HG: CPT | Performed by: FAMILY MEDICINE

## 2023-06-29 PROCEDURE — 3044F HG A1C LEVEL LT 7.0%: CPT | Performed by: FAMILY MEDICINE

## 2023-06-29 PROCEDURE — 1123F ACP DISCUSS/DSCN MKR DOCD: CPT | Performed by: FAMILY MEDICINE

## 2023-06-29 PROCEDURE — G0439 PPPS, SUBSEQ VISIT: HCPCS | Performed by: FAMILY MEDICINE

## 2023-06-29 PROCEDURE — 3078F DIAST BP <80 MM HG: CPT | Performed by: FAMILY MEDICINE

## 2023-06-29 SDOH — ECONOMIC STABILITY: FOOD INSECURITY: WITHIN THE PAST 12 MONTHS, YOU WORRIED THAT YOUR FOOD WOULD RUN OUT BEFORE YOU GOT MONEY TO BUY MORE.: NEVER TRUE

## 2023-06-29 SDOH — ECONOMIC STABILITY: INCOME INSECURITY: HOW HARD IS IT FOR YOU TO PAY FOR THE VERY BASICS LIKE FOOD, HOUSING, MEDICAL CARE, AND HEATING?: NOT HARD AT ALL

## 2023-06-29 SDOH — ECONOMIC STABILITY: FOOD INSECURITY: WITHIN THE PAST 12 MONTHS, THE FOOD YOU BOUGHT JUST DIDN'T LAST AND YOU DIDN'T HAVE MONEY TO GET MORE.: NEVER TRUE

## 2023-06-29 SDOH — ECONOMIC STABILITY: HOUSING INSECURITY
IN THE LAST 12 MONTHS, WAS THERE A TIME WHEN YOU DID NOT HAVE A STEADY PLACE TO SLEEP OR SLEPT IN A SHELTER (INCLUDING NOW)?: NO

## 2023-06-29 ASSESSMENT — PATIENT HEALTH QUESTIONNAIRE - PHQ9
SUM OF ALL RESPONSES TO PHQ QUESTIONS 1-9: 0
SUM OF ALL RESPONSES TO PHQ QUESTIONS 1-9: 0
2. FEELING DOWN, DEPRESSED OR HOPELESS: 0
SUM OF ALL RESPONSES TO PHQ QUESTIONS 1-9: 0
SUM OF ALL RESPONSES TO PHQ9 QUESTIONS 1 & 2: 0
1. LITTLE INTEREST OR PLEASURE IN DOING THINGS: 0
SUM OF ALL RESPONSES TO PHQ QUESTIONS 1-9: 0

## 2023-06-29 ASSESSMENT — LIFESTYLE VARIABLES
HOW MANY STANDARD DRINKS CONTAINING ALCOHOL DO YOU HAVE ON A TYPICAL DAY: PATIENT DOES NOT DRINK
HOW OFTEN DO YOU HAVE A DRINK CONTAINING ALCOHOL: NEVER

## 2023-06-30 LAB
ALBUMIN SERPL-MCNC: 3.6 G/DL (ref 3.5–5)
ALBUMIN/GLOB SERPL: 1.2 (ref 1.1–2.2)
ALP SERPL-CCNC: 59 U/L (ref 45–117)
ALT SERPL-CCNC: 23 U/L (ref 12–78)
ANION GAP SERPL CALC-SCNC: 8 MMOL/L (ref 5–15)
AST SERPL-CCNC: 16 U/L (ref 15–37)
BILIRUB SERPL-MCNC: 0.4 MG/DL (ref 0.2–1)
BUN SERPL-MCNC: 24 MG/DL (ref 6–20)
BUN/CREAT SERPL: 24 (ref 12–20)
CALCIUM SERPL-MCNC: 9.9 MG/DL (ref 8.5–10.1)
CHLORIDE SERPL-SCNC: 100 MMOL/L (ref 97–108)
CO2 SERPL-SCNC: 30 MMOL/L (ref 21–32)
CREAT SERPL-MCNC: 0.99 MG/DL (ref 0.55–1.02)
EST. AVERAGE GLUCOSE BLD GHB EST-MCNC: 140 MG/DL
GLOBULIN SER CALC-MCNC: 3.1 G/DL (ref 2–4)
GLUCOSE SERPL-MCNC: 205 MG/DL (ref 65–100)
HBA1C MFR BLD: 6.5 % (ref 4–5.6)
POTASSIUM SERPL-SCNC: 4.7 MMOL/L (ref 3.5–5.1)
PROT SERPL-MCNC: 6.7 G/DL (ref 6.4–8.2)
SODIUM SERPL-SCNC: 138 MMOL/L (ref 136–145)

## 2023-07-01 LAB — TSH SERPL DL<=0.05 MIU/L-ACNC: 2.36 UIU/ML (ref 0.45–4.5)

## 2023-07-06 RX ORDER — LANCETS 28 GAUGE
EACH MISCELLANEOUS
Qty: 100 EACH | Refills: 3 | Status: SHIPPED | OUTPATIENT
Start: 2023-07-06

## 2023-07-10 DIAGNOSIS — R19.5 POSITIVE COLORECTAL CANCER SCREENING USING COLOGUARD TEST: Primary | ICD-10-CM

## 2023-07-10 LAB — NONINV COLON CA DNA+OCC BLD SCRN STL QL: POSITIVE

## 2023-07-10 RX ORDER — PAROXETINE 10 MG/1
TABLET, FILM COATED ORAL
Qty: 135 TABLET | Refills: 0 | Status: SHIPPED | OUTPATIENT
Start: 2023-07-10

## 2023-07-10 NOTE — TELEPHONE ENCOUNTER
PCP: Wisam Monson,     Last Visit 6/29/2023   Future Appointments   Date Time Provider [address]   7/17/2023  2:30 PM Foreign Zhong, PT [facility] Ninfa Lunaier Re   7/20/2023 10:00 AM Judi Dumont  [address] BS AMB   11/7/2023 10:00 AM MARYANA Nathan - YIMI RUIZ BS AMB       Requested Prescriptions     Pending Prescriptions Disp Refills    PARoxetine (PAXIL) 10 MG tablet [Pharmacy Med Name: PAROXETINE HYDROCHLORIDE 10 MG Tablet] 135 tablet 0     Sig: TAKE 1 AND 1/2 TABLETS EVERY DAY    metFORMIN (GLUCOPHAGE) 500 MG tablet [Pharmacy Med Name: METFORMIN HYDROCHLORIDE 500 MG Tablet] 180 tablet 0     Sig: TAKE 1 TABLET TWICE DAILY WITH MEALS         Other Comments: Last Refill 02/10/2023

## 2023-07-17 ENCOUNTER — HOSPITAL ENCOUNTER (OUTPATIENT)
Facility: HOSPITAL | Age: 78
Setting detail: RECURRING SERIES
Discharge: HOME | End: 2023-07-20
Payer: MEDICARE

## 2023-07-17 PROCEDURE — 97161 PT EVAL LOW COMPLEX 20 MIN: CPT

## 2023-07-17 NOTE — THERAPY EVALUATION
Physical Therapy at [facility],   a part of [address] [place] [address] Maurisio Pereira  TCZFT:[phone] LOP:[phone]                                                                            PHYSICAL THERAPY - MEDICARE EVALUATION/PLAN OF CARE NOTE (updated 3/23)      Date: 2023          Patient Name:  Enrike Alcaraz :  1945   Medical   Diagnosis:  Unsteadiness on feet [R26.81] Treatment Diagnosis:  R26.81   Unsteadiness on feet    Referral Source:  Geremias Horner DO Provider #:  [id number]                  Insurance: Payor: [employer] / Plan: [place] / Product Type: *No Product type* /      Patient  verified yes     Visit #   Current  / Total 1 --   Time   In / Out 230 P 3:05 P   Total Treatment Time 35   Total Timed Codes 5   1:1 Treatment Time 35       BC Totals Reminder:  bill using total billable   min of TIMED therapeutic procedures and modalities. 8-22 min = 1 unit; 23-37 min = 2 units; 38-52 min = 3 units;  53-67 min = 4 units; 68-82 min = 5 units           SUBJECTIVE  Pain Level (0-10 scale): 0  []constant []intermittent []improving []worsening []no change since onset    Any medication changes, allergies to medications, adverse drug reactions, diagnosis change, or new procedure performed?: [x] No    [] Yes (see summary sheet for update)  Medications: Verified on Patient Summary List    Subjective functional status/changes:     Pt reports that she tripped/fell mid . She was wearing rubber slip on sandals. She broke her nose; had several bruises on her face. She has had ~2-3 falls in the last year. >1 yr ago she fell in a parking lot. She owns a cane-- uses it occasionally. She does have RA and OA-- had \"bad flare up\" of RA ~1.5 yrs ago-- her R knee still bothers her.      Start of Care: 2023  Onset Date: fall   Current symptoms/Complaints: inc'd falling, dec'd balance and dec'd

## 2023-07-20 ENCOUNTER — OFFICE VISIT (OUTPATIENT)
Age: 78
End: 2023-07-20
Payer: MEDICARE

## 2023-07-20 VITALS
HEIGHT: 59 IN | WEIGHT: 145 LBS | OXYGEN SATURATION: 96 % | BODY MASS INDEX: 29.23 KG/M2 | RESPIRATION RATE: 20 BRPM | TEMPERATURE: 97.9 F | SYSTOLIC BLOOD PRESSURE: 135 MMHG | DIASTOLIC BLOOD PRESSURE: 60 MMHG

## 2023-07-20 DIAGNOSIS — R19.5 POSITIVE COLORECTAL CANCER SCREENING USING COLOGUARD TEST: ICD-10-CM

## 2023-07-20 DIAGNOSIS — D12.6 ADENOMATOUS POLYP OF COLON, UNSPECIFIED PART OF COLON: ICD-10-CM

## 2023-07-20 PROCEDURE — 1090F PRES/ABSN URINE INCON ASSESS: CPT | Performed by: SURGERY

## 2023-07-20 PROCEDURE — 3078F DIAST BP <80 MM HG: CPT | Performed by: SURGERY

## 2023-07-20 PROCEDURE — G8399 PT W/DXA RESULTS DOCUMENT: HCPCS | Performed by: SURGERY

## 2023-07-20 PROCEDURE — 1123F ACP DISCUSS/DSCN MKR DOCD: CPT | Performed by: SURGERY

## 2023-07-20 PROCEDURE — 99204 OFFICE O/P NEW MOD 45 MIN: CPT | Performed by: SURGERY

## 2023-07-20 PROCEDURE — G8419 CALC BMI OUT NRM PARAM NOF/U: HCPCS | Performed by: SURGERY

## 2023-07-20 PROCEDURE — 3075F SYST BP GE 130 - 139MM HG: CPT | Performed by: SURGERY

## 2023-07-20 PROCEDURE — 1036F TOBACCO NON-USER: CPT | Performed by: SURGERY

## 2023-07-20 PROCEDURE — G8427 DOCREV CUR MEDS BY ELIG CLIN: HCPCS | Performed by: SURGERY

## 2023-07-20 RX ORDER — SODIUM, POTASSIUM,MAG SULFATES 17.5-3.13G
1 SOLUTION, RECONSTITUTED, ORAL ORAL ONCE
Qty: 1 EACH | Refills: 0 | Status: SHIPPED | OUTPATIENT
Start: 2023-07-20 | End: 2023-07-20

## 2023-07-20 ASSESSMENT — ENCOUNTER SYMPTOMS
DIARRHEA: 0
ABDOMINAL DISTENTION: 0
VOMITING: 0
SHORTNESS OF BREATH: 0
ABDOMINAL PAIN: 0
CONSTIPATION: 0
NAUSEA: 0
BLOOD IN STOOL: 0

## 2023-07-20 ASSESSMENT — PATIENT HEALTH QUESTIONNAIRE - PHQ9
10. IF YOU CHECKED OFF ANY PROBLEMS, HOW DIFFICULT HAVE THESE PROBLEMS MADE IT FOR YOU TO DO YOUR WORK, TAKE CARE OF THINGS AT HOME, OR GET ALONG WITH OTHER PEOPLE: 0
8. MOVING OR SPEAKING SO SLOWLY THAT OTHER PEOPLE COULD HAVE NOTICED. OR THE OPPOSITE, BEING SO FIGETY OR RESTLESS THAT YOU HAVE BEEN MOVING AROUND A LOT MORE THAN USUAL: 0
3. TROUBLE FALLING OR STAYING ASLEEP: 0
7. TROUBLE CONCENTRATING ON THINGS, SUCH AS READING THE NEWSPAPER OR WATCHING TELEVISION: 0
5. POOR APPETITE OR OVEREATING: 0
9. THOUGHTS THAT YOU WOULD BE BETTER OFF DEAD, OR OF HURTING YOURSELF: 0
4. FEELING TIRED OR HAVING LITTLE ENERGY: 0
SUM OF ALL RESPONSES TO PHQ QUESTIONS 1-9: 0
6. FEELING BAD ABOUT YOURSELF - OR THAT YOU ARE A FAILURE OR HAVE LET YOURSELF OR YOUR FAMILY DOWN: 0
2. FEELING DOWN, DEPRESSED OR HOPELESS: 0
SUM OF ALL RESPONSES TO PHQ QUESTIONS 1-9: 0

## 2023-07-20 NOTE — ASSESSMENT & PLAN NOTE
Needs diagnostic colonoscopy. I discussed the intended procedure as well as alternative treatments including doing nothing. I discussed the risks of the procedure including but not limited to bleeding, perforation, aspiration, and damage to surrounding structures. I answered all questions related to the procedure. Understanding was verbalized and we will proceed as planned.

## 2023-07-20 NOTE — PROGRESS NOTES
[facility]    Subjective    Patient ID: Yari Aguayo is a 65 y.o. female. No chief complaint on file. HPI Comments: Yari Aguayo presents today for positive cologuard and hx of colon polyps. No gross blood, no change in stools, positive family hx of colon cancer, No recent weight loss. No other complaints. Allergies   Allergen Reactions    Sulfa Antibiotics Hives     Other reaction(s): Unknown (comments)    Penicillins Rash and Swelling     Other reaction(s): Unknown (comments)       Current Outpatient Medications:     PARoxetine (PAXIL) 10 MG tablet, TAKE 1 AND 1/2 TABLETS EVERY DAY, Disp: 135 tablet, Rfl: 0    metFORMIN (GLUCOPHAGE) 500 MG tablet, TAKE 1 TABLET TWICE DAILY WITH MEALS, Disp: 180 tablet, Rfl: 0    GlucoCom Lancets MISC, To check sugars daily. , Disp: 100 each, Rfl: 3    metoprolol succinate (TOPROL XL) 25 MG extended release tablet, TAKE 1 TABLET EVERY DAY, Disp: 90 tablet, Rfl: 1    CALCIUM PO, Take 600 mg by mouth daily, Disp: , Rfl:     Multiple Vitamin (MULTIVITAMIN PO), Take by mouth, Disp: , Rfl:     acetaminophen (TYLENOL) 650 MG extended release tablet, Take 1 tablet by mouth in the morning and 1 tablet at noon and 1 tablet in the evening., Disp: , Rfl:     amLODIPine (NORVASC) 2.5 MG tablet, TAKE 1 TABLET EVERY DAY, Disp: , Rfl:     Cholecalciferol 50 MCG (2000 UT) CAPS, Take by mouth daily, Disp: , Rfl:     cyanocobalamin 1000 MCG tablet, Take 1 tablet by mouth daily, Disp: , Rfl:     hydroxychloroquine (PLAQUENIL) 200 MG tablet, Take 1 tablet by mouth 2 times daily, Disp: , Rfl:     predniSONE (DELTASONE) 5 MG tablet, Take 3 tablets by mouth daily, Disp: , Rfl:     rosuvastatin (CRESTOR) 10 MG tablet, Take 1 tablet by mouth, Disp: , Rfl:     valsartan (DIOVAN) 80 MG tablet, Take 1 tablet by mouth daily, Disp: , Rfl:     blood glucose test strips (ASCENSIA AUTODISC VI;ONE TOUCH ULTRA TEST VI) strip, 1 each by In Vitro route daily As needed. ,

## 2023-07-20 NOTE — ASSESSMENT & PLAN NOTE
Needs colonoscopy given hx of polyps and it has been over 10 years since her last one. Also has a positive cologuard. Needs diagnostic colonoscopy. I discussed the intended procedure as well as alternative treatments including doing nothing. I discussed the risks of the procedure including but not limited to bleeding, perforation, aspiration, and damage to surrounding structures. I answered all questions related to the procedure. Understanding was verbalized and we will proceed as planned.       \

## 2023-07-20 NOTE — H&P (VIEW-ONLY)
personally performed the services described in this document. This documentation was facilitated by voice recognition software and may contain inadvertent typographical errors. If there are substantial concerns about the content of this note that may affect patient care, please contact me for clarification.

## 2023-08-01 ENCOUNTER — HOSPITAL ENCOUNTER (OUTPATIENT)
Facility: HOSPITAL | Age: 78
Setting detail: RECURRING SERIES
Discharge: HOME | End: 2023-08-04
Payer: MEDICARE

## 2023-08-01 PROCEDURE — 97110 THERAPEUTIC EXERCISES: CPT

## 2023-08-01 NOTE — PROGRESS NOTES
PHYSICAL THERAPY - MEDICARE DAILY TREATMENT NOTE (updated 3/23)      Date: 2023          Patient Name:  Brinda Cox :  1945   Medical   Diagnosis:  Unsteady gait [R26.81] Treatment Diagnosis:  R26.89   Abnormalities of gait and mobility    Referral Source:  [employer], [employer] Insurance:   Payor: Johnathan Hernandez / Plan: Jori Opitz / Product Type: *No Product type* /                     Patient  verified yes     Visit #   Current  / Total 2 12   Time   In / Out 930 A 10:15 A   Total Treatment Time 45   Total Timed Codes 45   1:1 Treatment Time 40      [facility] Totals Reminder:  bill using total billable   min of TIMED therapeutic procedures and modalities. 8-22 min = 1 unit; 23-37 min = 2 units; 38-52 min = 3 units; 53-67 min = 4 units; 68-82 min = 5 units            SUBJECTIVE    Pain Level (0-10 scale): 0    Any medication changes, allergies to medications, adverse drug reactions, diagnosis change, or new procedure performed?: [x] No    [] Yes (see summary sheet for update)  Medications: Verified on Patient Summary List    Subjective functional status/changes:     \"I've been Jewish about doing my exercises! \"    OBJECTIVE      Therapeutic Procedures: Tx Min Billable or 1:1 Min (if diff from Tx Min) Procedure, Rationale, Specifics   45 40 06743 Therapeutic Exercise (timed):  increase ROM, strength, coordination, balance, and proprioception to improve patient's ability to progress to PLOF and address remaining functional goals. (see flow sheet as applicable)     Details if applicable:     45 40    Total Total         [x]  Patient Education billed concurrently with other procedures   [x] Review HEP    [] Progressed/Changed HEP, detail:    [] Other detail:         Other Objective/Functional Measures  FOTO complete, see chart. Pain Level at end of session (0-10 scale): 0      Assessment   Very good tolerance to exercise progression.  Able to perform retro walking with close PT

## 2023-08-06 ENCOUNTER — ANESTHESIA EVENT (OUTPATIENT)
Facility: HOSPITAL | Age: 78
End: 2023-08-06
Payer: MEDICARE

## 2023-08-07 ENCOUNTER — ANESTHESIA (OUTPATIENT)
Facility: HOSPITAL | Age: 78
End: 2023-08-07
Payer: MEDICARE

## 2023-08-07 ENCOUNTER — HOSPITAL ENCOUNTER (OUTPATIENT)
Facility: HOSPITAL | Age: 78
Discharge: HOME | End: 2023-08-07
Attending: SURGERY | Admitting: SURGERY
Payer: MEDICARE

## 2023-08-07 VITALS
TEMPERATURE: 98.9 F | HEIGHT: 59 IN | SYSTOLIC BLOOD PRESSURE: 140 MMHG | WEIGHT: 142.6 LBS | BODY MASS INDEX: 28.75 KG/M2 | RESPIRATION RATE: 14 BRPM | HEART RATE: 81 BPM | DIASTOLIC BLOOD PRESSURE: 72 MMHG | OXYGEN SATURATION: 97 %

## 2023-08-07 LAB
GLUCOSE BLD STRIP.AUTO-MCNC: 113 MG/DL (ref 65–117)
GLUCOSE BLD STRIP.AUTO-MCNC: 91 MG/DL (ref 65–117)
SERVICE CMNT-IMP: NORMAL
SERVICE CMNT-IMP: NORMAL

## 2023-08-07 PROCEDURE — 6370000000 HC RX 637 (ALT 250 FOR IP): Performed by: SURGERY

## 2023-08-07 PROCEDURE — 3600007502: Performed by: SURGERY

## 2023-08-07 PROCEDURE — 2580000003 HC RX 258: Performed by: NURSE ANESTHETIST, CERTIFIED REGISTERED

## 2023-08-07 PROCEDURE — 3700000000 HC ANESTHESIA ATTENDED CARE: Performed by: SURGERY

## 2023-08-07 PROCEDURE — 2580000003 HC RX 258: Performed by: SURGERY

## 2023-08-07 PROCEDURE — 2709999900 HC NON-CHARGEABLE SUPPLY: Performed by: SURGERY

## 2023-08-07 PROCEDURE — 3700000001 HC ADD 15 MINUTES (ANESTHESIA): Performed by: SURGERY

## 2023-08-07 PROCEDURE — 6360000002 HC RX W HCPCS: Performed by: NURSE ANESTHETIST, CERTIFIED REGISTERED

## 2023-08-07 PROCEDURE — 7100000010 HC PHASE II RECOVERY - FIRST 15 MIN: Performed by: SURGERY

## 2023-08-07 PROCEDURE — 88305 TISSUE EXAM BY PATHOLOGIST: CPT

## 2023-08-07 PROCEDURE — 2500000003 HC RX 250 WO HCPCS: Performed by: NURSE ANESTHETIST, CERTIFIED REGISTERED

## 2023-08-07 PROCEDURE — 3600007512: Performed by: SURGERY

## 2023-08-07 PROCEDURE — 7100000011 HC PHASE II RECOVERY - ADDTL 15 MIN: Performed by: SURGERY

## 2023-08-07 PROCEDURE — 82962 GLUCOSE BLOOD TEST: CPT

## 2023-08-07 RX ORDER — SODIUM CHLORIDE 0.9 % (FLUSH) 0.9 %
5-40 SYRINGE (ML) INJECTION PRN
Status: DISCONTINUED | OUTPATIENT
Start: 2023-08-07 | End: 2023-08-07 | Stop reason: HOSPADM

## 2023-08-07 RX ORDER — SIMETHICONE 20 MG/.3ML
EMULSION ORAL PRN
Status: DISCONTINUED | OUTPATIENT
Start: 2023-08-07 | End: 2023-08-07 | Stop reason: ALTCHOICE

## 2023-08-07 RX ORDER — SODIUM CHLORIDE 9 MG/ML
INJECTION, SOLUTION INTRAVENOUS CONTINUOUS PRN
Status: COMPLETED | OUTPATIENT
Start: 2023-08-07 | End: 2023-08-07

## 2023-08-07 RX ORDER — SODIUM CHLORIDE 9 MG/ML
INJECTION, SOLUTION INTRAVENOUS CONTINUOUS PRN
Status: DISCONTINUED | OUTPATIENT
Start: 2023-08-07 | End: 2023-08-07 | Stop reason: SDUPTHER

## 2023-08-07 RX ORDER — LIDOCAINE HYDROCHLORIDE 20 MG/ML
INJECTION, SOLUTION EPIDURAL; INFILTRATION; INTRACAUDAL; PERINEURAL PRN
Status: DISCONTINUED | OUTPATIENT
Start: 2023-08-07 | End: 2023-08-07 | Stop reason: SDUPTHER

## 2023-08-07 RX ORDER — METOPROLOL TARTRATE 5 MG/5ML
INJECTION INTRAVENOUS PRN
Status: DISCONTINUED | OUTPATIENT
Start: 2023-08-07 | End: 2023-08-07 | Stop reason: SDUPTHER

## 2023-08-07 RX ORDER — SODIUM CHLORIDE 9 MG/ML
INJECTION, SOLUTION INTRAVENOUS CONTINUOUS
Status: DISCONTINUED | OUTPATIENT
Start: 2023-08-07 | End: 2023-08-07 | Stop reason: HOSPADM

## 2023-08-07 RX ORDER — SODIUM CHLORIDE 0.9 % (FLUSH) 0.9 %
5-40 SYRINGE (ML) INJECTION EVERY 12 HOURS SCHEDULED
Status: DISCONTINUED | OUTPATIENT
Start: 2023-08-07 | End: 2023-08-07 | Stop reason: HOSPADM

## 2023-08-07 RX ORDER — SODIUM CHLORIDE 9 MG/ML
25 INJECTION, SOLUTION INTRAVENOUS PRN
Status: DISCONTINUED | OUTPATIENT
Start: 2023-08-07 | End: 2023-08-07 | Stop reason: HOSPADM

## 2023-08-07 RX ADMIN — PROPOFOL 20 MG: 10 INJECTION, EMULSION INTRAVENOUS at 12:59

## 2023-08-07 RX ADMIN — PROPOFOL 20 MG: 10 INJECTION, EMULSION INTRAVENOUS at 12:49

## 2023-08-07 RX ADMIN — LIDOCAINE HYDROCHLORIDE 100 MG: 20 INJECTION, SOLUTION EPIDURAL; INFILTRATION; INTRACAUDAL; PERINEURAL at 12:45

## 2023-08-07 RX ADMIN — PROPOFOL 50 MG: 10 INJECTION, EMULSION INTRAVENOUS at 12:45

## 2023-08-07 RX ADMIN — PROPOFOL 20 MG: 10 INJECTION, EMULSION INTRAVENOUS at 12:53

## 2023-08-07 RX ADMIN — PROPOFOL 20 MG: 10 INJECTION, EMULSION INTRAVENOUS at 12:51

## 2023-08-07 RX ADMIN — PROPOFOL 20 MG: 10 INJECTION, EMULSION INTRAVENOUS at 12:57

## 2023-08-07 RX ADMIN — PROPOFOL 40 MG: 10 INJECTION, EMULSION INTRAVENOUS at 13:03

## 2023-08-07 RX ADMIN — PROPOFOL 20 MG: 10 INJECTION, EMULSION INTRAVENOUS at 13:01

## 2023-08-07 RX ADMIN — PROPOFOL 10 MG: 10 INJECTION, EMULSION INTRAVENOUS at 12:47

## 2023-08-07 RX ADMIN — SODIUM CHLORIDE: 9 INJECTION, SOLUTION INTRAVENOUS at 12:39

## 2023-08-07 RX ADMIN — METOPROLOL TARTRATE 2.5 MG: 1 INJECTION, SOLUTION INTRAVENOUS at 12:41

## 2023-08-07 ASSESSMENT — PAIN - FUNCTIONAL ASSESSMENT: PAIN_FUNCTIONAL_ASSESSMENT: NONE - DENIES PAIN

## 2023-08-07 NOTE — ANESTHESIA POSTPROCEDURE EVALUATION
Department of Anesthesiology  Postprocedure Note    Patient: Rosaura Duenas  MRN: [id number]  YOB: 1945  Date of evaluation: 8/7/2023      Procedure Summary     Date: 08/07/23 Room / Location: [facility] ENDO 02 / [facility] ENDOSCOPY    Anesthesia Start: 1239 Anesthesia Stop: 0    Procedures:       COLONOSCOPY (Lower GI Region)      COLONOSCOPY POLYPECTOMY SNARE/COLD BIOPSY (Lower GI Region) Diagnosis:       Positive occult stool blood test      Benign neoplasm of colon, unspecified part of colon      (Positive occult stool blood test [R19.5])      (Benign neoplasm of colon, unspecified part of colon [D12.6])    Surgeons: Babrer Pritchett MD Responsible Provider: Charis Chester DO    Anesthesia Type: MAC ASA Status: 2          Anesthesia Type: MAC    Kelsie Phase I: Kelsie Score: 10    Kelsie Phase II: Kelsie Score: 10      Anesthesia Post Evaluation    Patient location during evaluation: bedside  Patient participation: complete - patient participated  Level of consciousness: awake and alert  Airway patency: patent  Nausea & Vomiting: no nausea and no vomiting  Complications: no  Cardiovascular status: hemodynamically stable  Respiratory status: room air  Hydration status: euvolemic  Pain management: adequate

## 2023-08-07 NOTE — DISCHARGE INSTRUCTIONS
Kellee Diaz  [id number]  1945    COLONOSCOPY DISCHARGE INSTRUCTIONS    DISCOMFORT:  Redness at IV site- apply warm compress to area; if redness or soreness persist- contact your physician  There may be a slight amount of blood passed from the rectum  Gaseous discomfort- walking, belching will help relieve any discomfort    DIET:   Regular diet, however, remember your colon is empty and a heavy meal will produce gas. Avoid these foods:  vegetables, fried / greasy foods, carbonated drinks for today  You may not drink alcoholic beverages for at least 12 hours       ACTIVITY:  You may not operate a vehicle for 12 hours  You may not engage in an occupation involving machinery or appliances for rest of today  You may then resume your normal daily activities it is recommended that you spend the remainder of the day resting -  avoid any strenuous activity. Avoid making any critical decisions for at least 24 hour    CALL M.D. ANY SIGN OF:   Increasing pain, nausea, vomiting  Abdominal distension (swelling)  New increased bleeding (oral or rectal)  Fever (chills)  Pain in chest area  Bloody discharge from nose or mouth  Shortness of breath     Follow-up Instructions:   Call Dr. Michelle Vicente for any questions or concerns.    Telephone # [phone]  Biopsy results will be available in  5 to 7 days      Impression: transverse colon polyp mild diverticulosis, hemorrhoids

## 2023-08-07 NOTE — INTERVAL H&P NOTE
Update History & Physical    The patient's History and Physical of July 20, 2023 was reviewed with the patient and I examined the patient. There was no change. The surgical site was confirmed by the patient and me. Plan: The risks, benefits, expected outcome, and alternative to the recommended procedure have been discussed with the patient. Patient understands and wants to proceed with the procedure.      Electronically signed by Ketan Pereira MD on 8/7/2023 at 12:27 PM

## 2023-08-07 NOTE — ANESTHESIA PRE PROCEDURE
Department of Anesthesiology  Preprocedure Note       Name:  Pily West   Age:  65 y.o.  :  1945                                          MRN:  [id number]         Date:  2023      Surgeon: Brian Bowers):  Jacinta Burris MD    Procedure: Procedure(s):  COLONOSCOPY    Medications prior to admission:   Prior to Admission medications    Medication Sig Start Date End Date Taking? Authorizing Provider   PARoxetine (PAXIL) 10 MG tablet TAKE 1 AND 1/2 TABLETS EVERY DAY 7/10/23   Laisha Navarro DO   metFORMIN (GLUCOPHAGE) 500 MG tablet TAKE 1 TABLET TWICE DAILY WITH MEALS 7/10/23   Laisha Navarro, DO   blood glucose test strips (ASCENSIA AUTODISC VI;ONE TOUCH ULTRA TEST VI) strip 1 each by In Vitro route daily As needed. 23   Laisha Navarro DO   GlucoCom Lancets MISC To check sugars daily. 23   Laisha Navarro DO   metoprolol succinate (TOPROL XL) 25 MG extended release tablet TAKE 1 TABLET EVERY DAY 23   Nakul Martin MD   CALCIUM PO Take 600 mg by mouth daily 11   Ar Automatic Reconciliation   Multiple Vitamin (MULTIVITAMIN PO) Take by mouth 11   Ar Automatic Reconciliation   acetaminophen (TYLENOL) 650 MG extended release tablet Take 1 tablet by mouth in the morning and 1 tablet at noon and 1 tablet in the evening.     Ar Automatic Reconciliation   amLODIPine (NORVASC) 2.5 MG tablet TAKE 1 TABLET EVERY DAY 23   Ar Automatic Reconciliation   Cholecalciferol 50 MCG (2000 UT) CAPS Take by mouth daily    Ar Automatic Reconciliation   cyanocobalamin 1000 MCG tablet Take 1 tablet by mouth daily    Ar Automatic Reconciliation   glimepiride (AMARYL) 1 MG tablet Take 1 tablet by mouth every morning (before breakfast)  Patient not taking: Reported on 2023   Ar Automatic Reconciliation   hydroxychloroquine (PLAQUENIL) 200 MG tablet Take 1 tablet by mouth 2 times daily 10/12/22   Ar Automatic Reconciliation   predniSONE (DELTASONE) 5 MG tablet Take 3

## 2023-08-07 NOTE — OP NOTE
Colonoscopy Procedure Note      Indications:    positive cologuard      :  Francoise Hernandez MD    Staff: Circulator: Darrell Newman RN  Endoscopy Technician: Angela Lim Endoscopy Technician: Sunil Kumar     Implants: none    Referring Provider: Cynthia Romeo DO    Sedation: MAC    Procedure Details:  After informed consent was obtained with all risks and benefits of procedure explained and preoperative exam completed, the patient was taken to the endoscopy suite and placed in the left lateral decubitus position. Upon sequential sedation as per above, a digital rectal exam was performed  And was normal.  The Olympus videocolonoscope  was inserted in the rectum and carefully advanced to the cecum, which was identified by the ileocecal valve and appendiceal orifice. The quality of preparation was excellent. The colonoscope was slowly withdrawn with careful evaluation between folds. Retroflexion in the rectum was performed and was normal..     Colon Findings:   Rectum: hemorrhoids  Sigmoid: mild diverticulosis; Descending Colon: normal  Transverse Colon: normal  Ascending Colon: 1  Sessile polyp(s), the largest 12 mm in size;  Cecum: normal  Terminal Ileum: not intubated      Therapies:  1 complete polypectomy were performed using hot snare  and the polyps were  retrieved    Complications:   None; patient tolerated the procedure well. Impression:    See Postoperative diagnosis above    Recommendations:  -Await pathology. Resume normal medication(s). Interventions:  1 piece meal polypectomy were performed using hot specimen retrieved and the polyps were  retrieved    Complications: None. Specimen Removed:    ID Type Source Tests Collected by Time Destination   A : colon polyp Tissue Colon-Ascending SURGICAL PATHOLOGY Judi Ambriz MD 8/7/2023 1256        EBL:  None.     Discharge Disposition:  Home in the company of a  when able to

## 2023-08-08 ENCOUNTER — APPOINTMENT (OUTPATIENT)
Facility: HOSPITAL | Age: 78
End: 2023-08-08
Payer: MEDICARE

## 2023-08-15 ENCOUNTER — HOSPITAL ENCOUNTER (OUTPATIENT)
Facility: HOSPITAL | Age: 78
Setting detail: RECURRING SERIES
Discharge: HOME | End: 2023-08-18
Payer: MEDICARE

## 2023-08-15 ENCOUNTER — TELEPHONE (OUTPATIENT)
Age: 78
End: 2023-08-15

## 2023-08-15 PROCEDURE — 97110 THERAPEUTIC EXERCISES: CPT

## 2023-08-15 PROCEDURE — 97112 NEUROMUSCULAR REEDUCATION: CPT

## 2023-08-15 NOTE — PROGRESS NOTES
PHYSICAL THERAPY - MEDICARE DAILY TREATMENT NOTE (updated 3/23)      Date: 8/15/2023          Patient Name:  Rosaura Duenas :  1945   Medical   Diagnosis:  Unsteadiness on feet [R26.81] Treatment Diagnosis:  R26.89   Abnormalities of gait and mobility    Referral Source:  Priscila Ford DO Insurance:   Payor: Tyronne Hodgkins / Plan: Maribell Valle / Product Type: *No Product type* /                     Patient  verified yes     Visit #   Current  / Total 3 12   Time   In / Out 1035 A 11:15 A   Total Treatment Time 40   Total Timed Codes 40   1:1 Treatment Time 40      [facility] Totals Reminder:  bill using total billable   min of TIMED therapeutic procedures and modalities. 8-22 min = 1 unit; 23-37 min = 2 units; 38-52 min = 3 units; 53-67 min = 4 units; 68-82 min = 5 units            SUBJECTIVE    Pain Level (0-10 scale): 0    Any medication changes, allergies to medications, adverse drug reactions, diagnosis change, or new procedure performed?: [x] No    [] Yes (see summary sheet for update)  Medications: Verified on Patient Summary List    Subjective functional status/changes:     \"I really feel good! \"    OBJECTIVE      Therapeutic Procedures: Tx Min Billable or 1:1 Min (if diff from Tx Min) Procedure, Rationale, Specifics   25  29337 Therapeutic Exercise (timed):  increase ROM, strength, coordination, balance, and proprioception to improve patient's ability to progress to PLOF and address remaining functional goals. (see flow sheet as applicable)     Details if applicable:     15  33572 Neuromuscular Re-Education (timed):  improve balance, coordination, kinesthetic sense, posture, core stability and proprioception to improve patient's ability to develop conscious control of individual muscles and awareness of position of extremities in order to progress to PLOF and address remaining functional goals.  (see flow sheet as applicable)     Details if applicable:        40     Total Total

## 2023-08-16 NOTE — TELEPHONE ENCOUNTER
Patient identified with two patient identifiers. Patient states she missed call from provider. Patient informed per Dr. Jorge Candelario polyp was benign but aggressive and she will need a repeat colonoscopy in 1 year for re-evaluation. Patient expressed understanding. Patient requesting to schedule visit now. Patient transferred to [facility] to schedule.

## 2023-08-22 ENCOUNTER — TELEPHONE (OUTPATIENT)
Dept: PRIMARY CARE CLINIC | Facility: CLINIC | Age: 78
End: 2023-08-22

## 2023-08-22 ENCOUNTER — HOSPITAL ENCOUNTER (OUTPATIENT)
Facility: HOSPITAL | Age: 78
Setting detail: RECURRING SERIES
Discharge: HOME | End: 2023-08-25
Payer: MEDICARE

## 2023-08-22 PROCEDURE — 97110 THERAPEUTIC EXERCISES: CPT

## 2023-08-22 PROCEDURE — 97112 NEUROMUSCULAR REEDUCATION: CPT

## 2023-08-22 RX ORDER — LANCETS 30 GAUGE
1 EACH MISCELLANEOUS DAILY
Qty: 100 EACH | Refills: 5 | Status: SHIPPED | OUTPATIENT
Start: 2023-08-22

## 2023-08-22 RX ORDER — GLUCOSAMINE HCL/CHONDROITIN SU 500-400 MG
CAPSULE ORAL
Qty: 100 STRIP | Refills: 5 | Status: SHIPPED | OUTPATIENT
Start: 2023-08-22

## 2023-08-22 NOTE — TELEPHONE ENCOUNTER
Patient called to request refills on [employer] Ultra Thin Lancets and [employer] True Metrix Test Strips to be sent to:    [facility]   [address] [place]  # [phone]    If you have any questions please contact patient.   Patient stated she will be away on a trip in Sept.

## 2023-08-22 NOTE — PROGRESS NOTES
PHYSICAL THERAPY - MEDICARE DAILY TREATMENT NOTE (updated 3/23)      Date: 2023          Patient Name:  Pily West :  1945   Medical   Diagnosis:  Unsteadiness on feet [R26.81] Treatment Diagnosis:  R26.89   Abnormalities of gait and mobility    Referral Source:  Laisha Navarro DO Insurance:   Payor: Alan Roland / Plan: ftopia / Product Type: *No Product type* /                     Patient  verified yes     Visit #   Current  / Total 4 12   Time   In / Out 1000 A 1030 A   Total Treatment Time 30   Total Timed Codes 30   1:1 Treatment Time 30      [facility] Totals Reminder:  bill using total billable   min of TIMED therapeutic procedures and modalities. 8-22 min = 1 unit; 23-37 min = 2 units; 38-52 min = 3 units; 53-67 min = 4 units; 68-82 min = 5 units            SUBJECTIVE    Pain Level (0-10 scale): 0    Any medication changes, allergies to medications, adverse drug reactions, diagnosis change, or new procedure performed?: [x] No    [] Yes (see summary sheet for update)  Medications: Verified on Patient Summary List    Subjective functional status/changes:     Pt doing very well. She needs to leave by 10:30 AM today. OBJECTIVE      Therapeutic Procedures: Tx Min Billable or 1:1 Min (if diff from Tx Min) Procedure, Rationale, Specifics   15  68689 Therapeutic Exercise (timed):  increase ROM, strength, coordination, balance, and proprioception to improve patient's ability to progress to PLOF and address remaining functional goals. (see flow sheet as applicable)     Details if applicable:     15  89100 Neuromuscular Re-Education (timed):  improve balance, coordination, kinesthetic sense, posture, core stability and proprioception to improve patient's ability to develop conscious control of individual muscles and awareness of position of extremities in order to progress to PLOF and address remaining functional goals.  (see flow sheet as applicable)     Details if

## 2023-08-29 ENCOUNTER — HOSPITAL ENCOUNTER (OUTPATIENT)
Facility: HOSPITAL | Age: 78
Setting detail: RECURRING SERIES
Discharge: HOME | End: 2023-09-01
Payer: MEDICARE

## 2023-08-29 PROCEDURE — 97110 THERAPEUTIC EXERCISES: CPT

## 2023-08-29 PROCEDURE — 97112 NEUROMUSCULAR REEDUCATION: CPT

## 2023-08-29 RX ORDER — ROSUVASTATIN CALCIUM 10 MG/1
TABLET, COATED ORAL
Qty: 90 TABLET | Refills: 1 | Status: SHIPPED | OUTPATIENT
Start: 2023-08-29

## 2023-08-29 NOTE — PROGRESS NOTES
progress to PLOF and address remaining functional goals. (see flow sheet as applicable)     Details if applicable:        45     Total Total         [x]  Patient Education billed concurrently with other procedures   [x] Review HEP    [] Progressed/Changed HEP, detail:    [] Other detail:         Other Objective/Functional Measures  Posture/Observation: fair sitting, standing posture  Gait: ambulates independently without AD. Slightly dec'd foot clearance occasionally     Strength (1-5)            Right Left   Hip flexion 4 4   Knee ext 4+ 4+   Knee flex 4+ 4+   Ankle DF 4+ 4+      Stairs: ascends, descends with step to pattern (hx of R>L knee OA)     Modified CTSIB:  Condition 1) 20 sec. No sway   Condition 2) 20 sec. Min to no sway  Condition 3) 20 sec. Min sway  Condition 4) 20 sec. Mod sway     Tandem stance >15 sec on airex pad     R SLS:  ~5-6 sec, mod sway  L SLS:  ~6-7 sec, mod sway     5xSTS: NT today due to R knee p! (Hx of OA)     TU) 11 sec    FOTO: 49/100 (47 at eval)      Pain Level at end of session (0-10 scale): 0      Assessment   Pt has completed 5 skilled PT visits. She has met/progressing toward PT goals. She demonstrates improvement in balance, LE strength since beginning PT. Reviewed HEP today and discussed return to walking program for exercise while in [place], use of ice on R knee (hx of OA). Pt is ready for D/C to HEP at this time.     Short term goals:  1) Pt will be I in initial HEP met  2) Pt will report >/=25% improvement in symptoms met  3) Pt will report compliance with wearing proper footwear to decrease fall risk met     Long Term Goals:   1) Pt will perform R and L SLS for >/=5 sec without hip drop in order to demonstrate improvement in balance met  2) Pt will dem >/=4+/5 bilat hip flex strength in order to assist with LE foot clearance during ambulation progressing  3) Pt will perform TUG in </= 12 sec in order to dem improvement in functional mobility met  4)Pt will be I in final

## 2023-08-29 NOTE — THERAPY DISCHARGE
Physical Therapy at [facility],   a part of [address] [place] [address] Ambreen Flores, [address]  Phone: [phone]  Fax: [phone]     DISCHARGE SUMMARY  Patient Name: Netta Ndiaye : 1945   Treatment/Medical Diagnosis: Unsteadiness on feet [R26.81]   Referral Source: Siomara Humphreys DO     Date of Initial Visit: 23 Attended Visits: 5 Missed Visits: 0     SUMMARY OF TREATMENT  Posture/Observation: fair sitting, standing posture  Gait: ambulates independently without AD. Slightly dec'd foot clearance occasionally     Strength (1-5)            Right Left   Hip flexion 4 4   Knee ext 4+ 4+   Knee flex 4+ 4+   Ankle DF 4+ 4+      Stairs: ascends, descends with step to pattern (hx of R>L knee OA)     Modified CTSIB:  Condition 1) 20 sec. No sway   Condition 2) 20 sec. Min to no sway  Condition 3) 20 sec. Min sway  Condition 4) 20 sec. Mod sway     Tandem stance >15 sec on airex pad     R SLS:  ~5-6 sec, mod sway  L SLS:  ~6-7 sec, mod sway     5xSTS: NT today due to R knee p! (Hx of OA)     TU) 11 sec     FOTO: 49/100 (47 at eval)     Assessment   Pt has completed 5 skilled PT visits. She has met/progressing toward PT goals. She demonstrates improvement in balance, LE strength since beginning PT. Reviewed HEP today and discussed return to walking program for exercise while in [place], use of ice on R knee (hx of OA). Pt is ready for D/C to HEP at this time.      Short term goals:  1) Pt will be I in initial HEP met  2) Pt will report >/=25% improvement in symptoms met  3) Pt will report compliance with wearing proper footwear to decrease fall risk met     Long Term Goals:   1) Pt will perform R and L SLS for >/=5 sec without hip drop in order to demonstrate improvement in balance met  2) Pt will dem >/=4+/5 bilat hip flex strength in order to assist with LE foot clearance during ambulation progressing  3) Pt will perform TUG in </= 12 sec in order to [employer]

## 2023-08-29 NOTE — TELEPHONE ENCOUNTER
Requested Prescriptions     Signed Prescriptions Disp Refills    rosuvastatin (CRESTOR) 10 MG tablet 90 tablet 1     Sig: TAKE 1 TABLET EVERY NIGHT     Authorizing Provider: Cande Longoria     Ordering User: Kimberlee ELI per MD    Future Appointments   Date Time Provider [address] [place] [address]   11/7/2023 10:00 AM MARYANA Mccloud NP AMB   8/8/2024 11:20 AM Blake Coley  [address]  BS AMB
normal...

## 2023-09-27 ENCOUNTER — HOSPITAL ENCOUNTER (EMERGENCY)
Facility: HOSPITAL | Age: 78
Discharge: HOME | End: 2023-09-27
Attending: STUDENT IN AN ORGANIZED HEALTH CARE EDUCATION/TRAINING PROGRAM
Payer: MEDICARE

## 2023-09-27 ENCOUNTER — APPOINTMENT (OUTPATIENT)
Facility: HOSPITAL | Age: 78
End: 2023-09-27
Payer: MEDICARE

## 2023-09-27 VITALS
RESPIRATION RATE: 18 BRPM | TEMPERATURE: 97.5 F | SYSTOLIC BLOOD PRESSURE: 156 MMHG | OXYGEN SATURATION: 96 % | DIASTOLIC BLOOD PRESSURE: 65 MMHG | HEART RATE: 69 BPM

## 2023-09-27 DIAGNOSIS — S09.90XA CLOSED HEAD INJURY, INITIAL ENCOUNTER: Primary | ICD-10-CM
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
## 2023-09-27 PROCEDURE — 70450 CT HEAD/BRAIN W/O DYE: CPT

## 2023-09-27 PROCEDURE — 6370000000 HC RX 637 (ALT 250 FOR IP): Performed by: STUDENT IN AN ORGANIZED HEALTH CARE EDUCATION/TRAINING PROGRAM

## 2023-09-27 PROCEDURE — 72125 CT NECK SPINE W/O DYE: CPT

## 2023-09-27 PROCEDURE — 99284 EMERGENCY DEPT VISIT MOD MDM: CPT

## 2023-09-27 RX ORDER — ACETAMINOPHEN 500 MG
1000 TABLET ORAL ONCE
Status: COMPLETED | OUTPATIENT
Start: 2023-09-27 | End: 2023-09-27

## 2023-09-27 RX ADMIN — ACETAMINOPHEN 1000 MG: 500 TABLET ORAL at 11:34

## 2023-09-27 ASSESSMENT — PAIN DESCRIPTION - ORIENTATION: ORIENTATION: POSTERIOR

## 2023-09-27 ASSESSMENT — PAIN DESCRIPTION - LOCATION: LOCATION: HEAD

## 2023-09-27 ASSESSMENT — PAIN SCALES - GENERAL: PAINLEVEL_OUTOF10: 5

## 2023-09-27 ASSESSMENT — PAIN DESCRIPTION - DESCRIPTORS: DESCRIPTORS: SORE

## 2023-09-27 NOTE — ED PROVIDER NOTES
[address] Minda Schilder, January 2015 normal colonoscopy, need  year follow up    Diabetes mellitus type 2, diet-controlled ([address]) 9/1/2015    Family history of skin cancer     sister- melanoma; son- several BCC    Hypercholesterolemia     Hypertension     IGT (impaired glucose tolerance)     pre diabetic    Osteopenia july 2015    PVC (premature ventricular contraction) February 2016    Dr Tiarra Arzate    Skin cancer March 2013    [facility], left cheek, Dr. Ziegler/Tamara [email or web address] evaluation    Sun-damaged skin     As a child and teen pt had bad sunburns    Sunburn, blistering        SURGICAL HISTORY       Past Surgical History:   Procedure Laterality Date    CARPAL TUNNEL RELEASE      bilat    COLONOSCOPY  12/09    5 years    COLONOSCOPY N/A 8/7/2023    COLONOSCOPY performed by Jose Cole MD at [facility] ENDOSCOPY    COLONOSCOPY N/A 8/7/2023    COLONOSCOPY POLYPECTOMY SNARE/COLD BIOPSY performed by Jose Cole MD at [facility] ENDOSCOPY    POLYPECTOMY  [phone]    colon       CURRENT MEDICATIONS       Discharge Medication List as of 9/27/2023  1:27 PM        CONTINUE these medications which have NOT CHANGED    Details   rosuvastatin (CRESTOR) 10 MG tablet TAKE 1 TABLET EVERY NIGHT, Disp-90 tablet, R-1Normal      Lancets MISC DAILY Starting Tue 8/22/2023, Disp-100 each, R-5, Normal[employer]ix Ultra Thin Lancets      blood glucose monitor strips Test 2 times a day & as needed for symptoms of irregular blood glucose. Dispense sufficient amount for indicated testing frequency plus additional to accommodate PRN testing needs.  [employer] True Metrix Test Strips, Disp-100 strip, R-5, Normal      Lactobacillus (PROBIOTIC ACIDOPHILUS PO) Take by mouthHistorical Med      PARoxetine (PAXIL) 10 MG tablet TAKE 1 AND 1/2 TABLETS EVERY DAY, Disp-135 tablet, R-0Normal      metFORMIN (GLUCOPHAGE) 500 MG tablet TAKE 1 TABLET TWICE DAILY WITH MEALS, Disp-180 tablet, R-0Normal      metoprolol succinate (TOPROL XL) 25 MG

## 2023-09-27 NOTE — ED NOTES
Patient given discharge paperwork and instructions by RN and provider. Patient verbalized understanding. No signs of distress at time of discharge. Patient ambulatory out of ER with steady gait.         Karley James RN  09/27/23 [id number]

## 2023-09-27 NOTE — ED TRIAGE NOTES
Patient arrives pov, ambulatory, and A & O x 4 to room. Patient arrives with cc of falling morning around 0915 today. Patient reports she tripped with her R foot and hit her head in the grocery store. Denies taking blood thinners. Denies LOC. Denies dizziness, cp, or sob at this time. Patient denies taking medication for pain pta.

## 2023-09-28 ENCOUNTER — TELEPHONE (OUTPATIENT)
Dept: PRIMARY CARE CLINIC | Facility: CLINIC | Age: 78
End: 2023-09-28

## 2023-09-28 NOTE — TELEPHONE ENCOUNTER
Patient fell yesterday and went immediately to the ER. Patient said they are fine and just wanted to let Tracy Marie know what happened. I informed the patient that the ER notes are in her chart and that we would notify Tracy Marie.

## 2023-10-18 ENCOUNTER — TELEPHONE (OUTPATIENT)
Age: 78
End: 2023-10-18

## 2023-11-30 RX ORDER — METOPROLOL SUCCINATE 25 MG/1
TABLET, EXTENDED RELEASE ORAL
Qty: 90 TABLET | Refills: 3 | Status: SHIPPED | OUTPATIENT
Start: 2023-11-30

## 2023-12-05 RX ORDER — PAROXETINE 10 MG/1
TABLET, FILM COATED ORAL
Qty: 135 TABLET | Refills: 1 | Status: SHIPPED | OUTPATIENT
Start: 2023-12-05

## 2024-01-01 NOTE — PROGRESS NOTES
Primary Cardiologist:  Dr. Roman Macias [facility] cardiology Edinson Martinez.  [phone]               Cardiology Consult/Progress Note        Requesting/referring provider: Ruma Gibson DO       Reason for Consult: Establish care      Assessment/Plan:  1.  Hypertension  2.  Type 2 diabetes mellitus  3.  History of SVT with PACs and PVCs  4.  Atypical chest discomfort noted late last year with prior history of atypical  chest discomfort  5.  Hypercholesterolemia  6. Palpitations       History of diabetes mellitus, hypertension and presumed hyperlipidemia but not on statin.  She reports some chest discomfort late last year which  was associated with symptoms of palpitations.  Symptoms were brief lasting seconds to minutes and overall description is not very classic for ischemic discomfort particularly because these were not exercise related episodes.  Nonetheless her stress test  demonstrated small to moderate sized perfusion defect in anterior wall/apex with overall low to intermediate risk stress test.  Her symptoms however have spontaneously improved over the last 3 months.     Continue statin with a diagnosis of presumed coronary artery disease.  Cont rosuvastatin 10 mg daily.    -Rare palpitations. Cont BB.   Denies CP, SOB    Cont Toprol Valsartan, Amlodipine, Toprol  Cont Crestor 10mg-  Labs with PCP soon  Follow-up with Dr. Owens in 1 year         Investigations ordered      []    High complexity decision making was performed   []    Patient is at high-risk of decompensation  with multiple organ involvement   []    Complex/difficult social determinants of  health outcomes   Total of ** minutes were spent on reviewing the records, analyzing investigations and documentation in the chart, on the day of visit including time for examination and time spent with the patient   Investigations personally reviewed and interpreted   Stress test December 2022:

## 2024-01-09 ENCOUNTER — OFFICE VISIT (OUTPATIENT)
Age: 79
End: 2024-01-09
Payer: MEDICARE

## 2024-01-09 VITALS
HEIGHT: 59 IN | HEART RATE: 54 BPM | OXYGEN SATURATION: 98 % | BODY MASS INDEX: 29.35 KG/M2 | WEIGHT: 145.6 LBS | DIASTOLIC BLOOD PRESSURE: 60 MMHG | SYSTOLIC BLOOD PRESSURE: 134 MMHG

## 2024-01-09 DIAGNOSIS — I49.3 VENTRICULAR PREMATURE DEPOLARIZATION: ICD-10-CM

## 2024-01-09 DIAGNOSIS — I10 ESSENTIAL (PRIMARY) HYPERTENSION: Primary | ICD-10-CM

## 2024-01-09 DIAGNOSIS — I47.10 SUPRAVENTRICULAR TACHYCARDIA: ICD-10-CM

## 2024-01-09 DIAGNOSIS — R07.89 ATYPICAL CHEST PAIN: ICD-10-CM

## 2024-01-09 DIAGNOSIS — E11.9 TYPE 2 DIABETES MELLITUS WITHOUT COMPLICATION, UNSPECIFIED WHETHER LONG TERM INSULIN USE (HCC): ICD-10-CM

## 2024-01-09 DIAGNOSIS — E78.2 MIXED HYPERLIPIDEMIA: ICD-10-CM

## 2024-01-09 DIAGNOSIS — R00.2 PALPITATIONS: ICD-10-CM

## 2024-01-09 PROCEDURE — G8427 DOCREV CUR MEDS BY ELIG CLIN: HCPCS

## 2024-01-09 PROCEDURE — 3078F DIAST BP <80 MM HG: CPT

## 2024-01-09 PROCEDURE — 99214 OFFICE O/P EST MOD 30 MIN: CPT

## 2024-01-09 PROCEDURE — G8484 FLU IMMUNIZE NO ADMIN: HCPCS

## 2024-01-09 PROCEDURE — G8399 PT W/DXA RESULTS DOCUMENT: HCPCS

## 2024-01-09 PROCEDURE — 3075F SYST BP GE 130 - 139MM HG: CPT

## 2024-01-09 PROCEDURE — 1036F TOBACCO NON-USER: CPT

## 2024-01-09 PROCEDURE — 1090F PRES/ABSN URINE INCON ASSESS: CPT

## 2024-01-09 PROCEDURE — G8419 CALC BMI OUT NRM PARAM NOF/U: HCPCS

## 2024-01-09 PROCEDURE — 1123F ACP DISCUSS/DSCN MKR DOCD: CPT

## 2024-01-09 ASSESSMENT — PATIENT HEALTH QUESTIONNAIRE - PHQ9
2. FEELING DOWN, DEPRESSED OR HOPELESS: 0
SUM OF ALL RESPONSES TO PHQ QUESTIONS 1-9: 0
SUM OF ALL RESPONSES TO PHQ9 QUESTIONS 1 & 2: 0
SUM OF ALL RESPONSES TO PHQ QUESTIONS 1-9: 0
1. LITTLE INTEREST OR PLEASURE IN DOING THINGS: 0

## 2024-01-11 ENCOUNTER — OFFICE VISIT (OUTPATIENT)
Dept: PRIMARY CARE CLINIC | Facility: CLINIC | Age: 79
End: 2024-01-11

## 2024-01-11 ENCOUNTER — TELEPHONE (OUTPATIENT)
Dept: PRIMARY CARE CLINIC | Facility: CLINIC | Age: 79
End: 2024-01-11

## 2024-01-11 VITALS
BODY MASS INDEX: 28.83 KG/M2 | SYSTOLIC BLOOD PRESSURE: 138 MMHG | HEIGHT: 59 IN | DIASTOLIC BLOOD PRESSURE: 75 MMHG | RESPIRATION RATE: 19 BRPM | OXYGEN SATURATION: 98 % | HEART RATE: 54 BPM | WEIGHT: 143 LBS

## 2024-01-11 DIAGNOSIS — Z78.0 POST-MENOPAUSAL: ICD-10-CM

## 2024-01-11 DIAGNOSIS — F41.9 ANXIETY: ICD-10-CM

## 2024-01-11 DIAGNOSIS — E78.5 HYPERLIPIDEMIA, UNSPECIFIED HYPERLIPIDEMIA TYPE: ICD-10-CM

## 2024-01-11 DIAGNOSIS — I10 PRIMARY HYPERTENSION: ICD-10-CM

## 2024-01-11 DIAGNOSIS — E11.9 CONTROLLED TYPE 2 DIABETES MELLITUS WITHOUT COMPLICATION, WITHOUT LONG-TERM CURRENT USE OF INSULIN (HCC): Primary | ICD-10-CM

## 2024-01-11 DIAGNOSIS — M06.9 RHEUMATOID ARTHRITIS, INVOLVING UNSPECIFIED SITE, UNSPECIFIED WHETHER RHEUMATOID FACTOR PRESENT (HCC): ICD-10-CM

## 2024-01-11 DIAGNOSIS — E11.9 CONTROLLED TYPE 2 DIABETES MELLITUS WITHOUT COMPLICATION, WITHOUT LONG-TERM CURRENT USE OF INSULIN (HCC): ICD-10-CM

## 2024-01-11 DIAGNOSIS — Z00.00 MEDICARE ANNUAL WELLNESS VISIT, SUBSEQUENT: ICD-10-CM

## 2024-01-11 PROBLEM — E11.22 TYPE 2 DIABETES MELLITUS WITH CHRONIC KIDNEY DISEASE (HCC): Status: RESOLVED | Noted: 2023-03-08 | Resolved: 2024-01-11

## 2024-01-11 LAB
ALBUMIN SERPL-MCNC: 3.9 G/DL (ref 3.5–5)
ALBUMIN/GLOB SERPL: 1.3 (ref 1.1–2.2)
ALP SERPL-CCNC: 63 U/L (ref 45–117)
ALT SERPL-CCNC: 21 U/L (ref 12–78)
ANION GAP SERPL CALC-SCNC: 5 MMOL/L (ref 5–15)
AST SERPL-CCNC: 13 U/L (ref 15–37)
BILIRUB SERPL-MCNC: 0.6 MG/DL (ref 0.2–1)
BUN SERPL-MCNC: 18 MG/DL (ref 6–20)
BUN/CREAT SERPL: 19 (ref 12–20)
CALCIUM SERPL-MCNC: 9.9 MG/DL (ref 8.5–10.1)
CHLORIDE SERPL-SCNC: 101 MMOL/L (ref 97–108)
CHOLEST SERPL-MCNC: 197 MG/DL
CO2 SERPL-SCNC: 32 MMOL/L (ref 21–32)
COMMENT:: NORMAL
CREAT SERPL-MCNC: 0.95 MG/DL (ref 0.55–1.02)
CREAT UR-MCNC: 31.2 MG/DL
ERYTHROCYTE [DISTWIDTH] IN BLOOD BY AUTOMATED COUNT: 12.7 % (ref 11.5–14.5)
EST. AVERAGE GLUCOSE BLD GHB EST-MCNC: 151 MG/DL
GLOBULIN SER CALC-MCNC: 3.1 G/DL (ref 2–4)
GLUCOSE SERPL-MCNC: 170 MG/DL (ref 65–100)
HCT VFR BLD AUTO: 45.4 % (ref 35–47)
HDLC SERPL-MCNC: 134 MG/DL
HDLC SERPL: 1.5 (ref 0–5)
HGB BLD-MCNC: 14.6 G/DL (ref 11.5–16)
LDLC SERPL CALC-MCNC: 40 MG/DL (ref 0–100)
MCH RBC QN AUTO: 31.9 PG (ref 26–34)
MCHC RBC AUTO-ENTMCNC: 32.2 G/DL (ref 30–36.5)
MCV RBC AUTO: 99.3 FL (ref 80–99)
MICROALBUMIN UR-MCNC: <0.5 MG/DL
MICROALBUMIN/CREAT UR-RTO: <16 MG/G (ref 0–30)
NRBC # BLD: 0 K/UL (ref 0–0.01)
NRBC BLD-RTO: 0 PER 100 WBC
PLATELET # BLD AUTO: 296 K/UL (ref 150–400)
PMV BLD AUTO: 9.9 FL (ref 8.9–12.9)
POTASSIUM SERPL-SCNC: 4.2 MMOL/L (ref 3.5–5.1)
PROT SERPL-MCNC: 7 G/DL (ref 6.4–8.2)
RBC # BLD AUTO: 4.57 M/UL (ref 3.8–5.2)
SODIUM SERPL-SCNC: 138 MMOL/L (ref 136–145)
SPECIMEN HOLD: NORMAL
TRIGL SERPL-MCNC: 115 MG/DL
VLDLC SERPL CALC-MCNC: 23 MG/DL
WBC # BLD AUTO: 9.3 K/UL (ref 3.6–11)

## 2024-01-11 RX ORDER — ROSUVASTATIN CALCIUM 10 MG/1
10 TABLET, COATED ORAL NIGHTLY
Qty: 90 TABLET | Refills: 3 | Status: SHIPPED | OUTPATIENT
Start: 2024-01-11

## 2024-01-11 RX ORDER — VALSARTAN 80 MG/1
80 TABLET ORAL DAILY
Qty: 90 TABLET | Refills: 3 | Status: SHIPPED | OUTPATIENT
Start: 2024-01-11

## 2024-01-11 RX ORDER — ROSUVASTATIN CALCIUM 10 MG/1
10 TABLET, COATED ORAL NIGHTLY
Qty: 90 TABLET | Refills: 3 | Status: CANCELLED | OUTPATIENT
Start: 2024-01-11

## 2024-01-11 RX ORDER — PAROXETINE 10 MG/1
15 TABLET, FILM COATED ORAL EVERY MORNING
Qty: 135 TABLET | Refills: 3 | Status: CANCELLED | OUTPATIENT
Start: 2024-01-11

## 2024-01-11 RX ORDER — PAROXETINE 10 MG/1
15 TABLET, FILM COATED ORAL EVERY MORNING
Qty: 135 TABLET | Refills: 3 | Status: SHIPPED | OUTPATIENT
Start: 2024-01-11

## 2024-01-11 RX ORDER — VALSARTAN 80 MG/1
80 TABLET ORAL DAILY
Qty: 90 TABLET | Refills: 3 | Status: CANCELLED | OUTPATIENT
Start: 2024-01-11

## 2024-01-11 RX ORDER — AMLODIPINE BESYLATE 2.5 MG/1
2.5 TABLET ORAL DAILY
Qty: 90 TABLET | Refills: 3 | Status: SHIPPED | OUTPATIENT
Start: 2024-01-11

## 2024-01-11 RX ORDER — AMLODIPINE BESYLATE 2.5 MG/1
2.5 TABLET ORAL DAILY
Qty: 90 TABLET | Refills: 3 | Status: CANCELLED | OUTPATIENT
Start: 2024-01-11

## 2024-01-11 ASSESSMENT — PATIENT HEALTH QUESTIONNAIRE - PHQ9
5. POOR APPETITE OR OVEREATING: 0
4. FEELING TIRED OR HAVING LITTLE ENERGY: 0
SUM OF ALL RESPONSES TO PHQ QUESTIONS 1-9: 0
SUM OF ALL RESPONSES TO PHQ QUESTIONS 1-9: 0
SUM OF ALL RESPONSES TO PHQ9 QUESTIONS 1 & 2: 0
1. LITTLE INTEREST OR PLEASURE IN DOING THINGS: 0
7. TROUBLE CONCENTRATING ON THINGS, SUCH AS READING THE NEWSPAPER OR WATCHING TELEVISION: 0
3. TROUBLE FALLING OR STAYING ASLEEP: 0
2. FEELING DOWN, DEPRESSED OR HOPELESS: 0
SUM OF ALL RESPONSES TO PHQ QUESTIONS 1-9: 0
6. FEELING BAD ABOUT YOURSELF - OR THAT YOU ARE A FAILURE OR HAVE LET YOURSELF OR YOUR FAMILY DOWN: 0
9. THOUGHTS THAT YOU WOULD BE BETTER OFF DEAD, OR OF HURTING YOURSELF: 0
10. IF YOU CHECKED OFF ANY PROBLEMS, HOW DIFFICULT HAVE THESE PROBLEMS MADE IT FOR YOU TO DO YOUR WORK, TAKE CARE OF THINGS AT HOME, OR GET ALONG WITH OTHER PEOPLE: 0
SUM OF ALL RESPONSES TO PHQ QUESTIONS 1-9: 0
8. MOVING OR SPEAKING SO SLOWLY THAT OTHER PEOPLE COULD HAVE NOTICED. OR THE OPPOSITE, BEING SO FIGETY OR RESTLESS THAT YOU HAVE BEEN MOVING AROUND A LOT MORE THAN USUAL: 0

## 2024-01-11 ASSESSMENT — LIFESTYLE VARIABLES
HOW OFTEN DO YOU HAVE A DRINK CONTAINING ALCOHOL: NEVER
HOW MANY STANDARD DRINKS CONTAINING ALCOHOL DO YOU HAVE ON A TYPICAL DAY: PATIENT DOES NOT DRINK

## 2024-01-11 NOTE — PROGRESS NOTES
\"Have you been to the ER, urgent care clinic since your last visit?  Hospitalized since your last visit?\"    October and September for falling.     “Have you seen or consulted any other health care providers outside of  since your last visit?”    NO

## 2024-01-11 NOTE — TELEPHONE ENCOUNTER
Patient needs all of today's refills to go to [facility] Removed unused pharmacies from the chart.    Amlodipine 2.5 mg  Metformin 500 mg  Paroxetine 10 mg  Rosuvastatin 10 mg  Valsartan 80 mg    [facility] - [place], [place] - [address] - P [phone] - F [phone]

## 2024-01-11 NOTE — PATIENT INSTRUCTIONS
week. You also may want to swim, bike, or do other activities.     Do not smoke. If you need help quitting, talk to your doctor about stop-smoking programs and medicines. These can increase your chances of quitting for good. Quitting smoking may be the most important step you can take to protect your heart. It is never too late to quit.     Limit alcohol to 2 drinks a day for men and 1 drink a day for women. Too much alcohol can cause health problems.     Manage other health problems such as diabetes, high blood pressure, and high cholesterol. If you think you may have a problem with alcohol or drug use, talk to your doctor.   Medicines    Take your medicines exactly as prescribed. Call your doctor if you think you are having a problem with your medicine.     If your doctor recommends aspirin, take the amount directed each day. Make sure you take aspirin and not another kind of pain reliever, such as acetaminophen (Tylenol).   When should you call for help?   Call 911 if you have symptoms of a heart attack. These may include:    Chest pain or pressure, or a strange feeling in the chest.     Sweating.     Shortness of breath.     Pain, pressure, or a strange feeling in the back, neck, jaw, or upper belly or in one or both shoulders or arms.     Lightheadedness or sudden weakness.     A fast or irregular heartbeat.   After you call 911, the  may tell you to chew 1 adult-strength or 2 to 4 low-dose aspirin. Wait for an ambulance. Do not try to drive yourself.  Watch closely for changes in your health, and be sure to contact your doctor if you have any problems.  Where can you learn more?  Go to https://www.health"[employer].net/patientEd and enter F075 to learn more about \"A Healthy Heart: Care Instructions.\"  Current as of: June 25, 2023               Content Version: 13.9  © 6089-1314 Healthwise, Inc[employer].   Care instructions adapted under license by [employer]. If you have questions about a medical condition

## 2024-01-11 NOTE — PROGRESS NOTES
Medicare Annual Wellness Visit    Patricia Lockwood is here for Medicare AWV, Follow-up, and Diabetes    Assessment & Plan   Controlled type 2 diabetes mellitus without complication, without long-term current use of insulin (HCC) - Check labs. Well controlled. Cont Metformin. Continue routine eye exams.   -     Hemoglobin A1C; Future  -     Microalbumin / Creatinine Urine Ratio; Future  -     Comprehensive Metabolic Panel; Future  -     metFORMIN (GLUCOPHAGE) 500 MG tablet; Take 1 tablet by mouth 2 times daily (with meals), Disp-180 tablet, R-2Normal  Primary hypertension - BP well controlled today. Cont Valsartan and Norvasc. Refilled. Check labs.   -     Comprehensive Metabolic Panel; Future  -     CBC; Future  -     valsartan (DIOVAN) 80 MG tablet; Take 1 tablet by mouth daily, Disp-90 tablet, R-3Normal  -     amLODIPine (NORVASC) 2.5 MG tablet; Take 1 tablet by mouth daily, Disp-90 tablet, R-3Normal  Hyperlipidemia, unspecified hyperlipidemia type - Check LDL on Crestor. IF not well controlled will titrate up. Check labs.   -     Lipid Panel; Future  -     Comprehensive Metabolic Panel; Future  -     rosuvastatin (CRESTOR) 10 MG tablet; Take 1 tablet by mouth nightly, Disp-90 tablet, R-3Normal  Post-menopausal - Recommend checking DEXA given she is on steroids for RA.   -     DEXA BONE DENSITY AXIAL SKELETON; Future  Medicare annual wellness visit, subsequent  Anxiety - Well controlled. Stable on Paxil. Cont current regimen. Refilled.   -     PARoxetine (PAXIL) 10 MG tablet; Take 1.5 tablets by mouth every morning, Disp-135 tablet, R-3Normal  Rheumatoid arthritis, involving unspecified site, unspecified whether rheumatoid factor present (HCC) - Followed by Rheum.     Recommendations for Preventive Services Due: see orders and patient instructions/AVS.  Recommended screening schedule for the next 5-10 years is provided to the patient in written form: see Patient Instructions/AVS.     No follow-ups on file.

## 2024-01-14 DIAGNOSIS — D75.89 MACROCYTOSIS WITHOUT ANEMIA: Primary | ICD-10-CM

## 2024-01-17 DIAGNOSIS — I10 PRIMARY HYPERTENSION: ICD-10-CM

## 2024-01-17 DIAGNOSIS — E11.9 CONTROLLED TYPE 2 DIABETES MELLITUS WITHOUT COMPLICATION, WITHOUT LONG-TERM CURRENT USE OF INSULIN (HCC): ICD-10-CM

## 2024-01-17 DIAGNOSIS — E78.5 HYPERLIPIDEMIA, UNSPECIFIED HYPERLIPIDEMIA TYPE: ICD-10-CM

## 2024-01-17 DIAGNOSIS — F41.9 ANXIETY: ICD-10-CM

## 2024-01-17 RX ORDER — AMLODIPINE BESYLATE 2.5 MG/1
2.5 TABLET ORAL DAILY
Qty: 90 TABLET | Refills: 3 | Status: SHIPPED | OUTPATIENT
Start: 2024-01-17

## 2024-01-17 RX ORDER — VALSARTAN 80 MG/1
80 TABLET ORAL DAILY
Qty: 90 TABLET | Refills: 3 | Status: SHIPPED | OUTPATIENT
Start: 2024-01-17

## 2024-01-17 RX ORDER — ROSUVASTATIN CALCIUM 10 MG/1
10 TABLET, COATED ORAL NIGHTLY
Qty: 90 TABLET | Refills: 3 | Status: SHIPPED | OUTPATIENT
Start: 2024-01-17

## 2024-01-17 RX ORDER — PAROXETINE 10 MG/1
15 TABLET, FILM COATED ORAL EVERY MORNING
Qty: 135 TABLET | Refills: 3 | Status: SHIPPED | OUTPATIENT
Start: 2024-01-17

## 2024-01-17 RX ORDER — METOPROLOL SUCCINATE 25 MG/1
25 TABLET, EXTENDED RELEASE ORAL DAILY
Qty: 90 TABLET | Refills: 3 | Status: SHIPPED | OUTPATIENT
Start: 2024-01-17

## 2024-02-12 ENCOUNTER — HOSPITAL ENCOUNTER (OUTPATIENT)
Age: 79
Discharge: HOME | End: 2024-02-15
Payer: MEDICARE

## 2024-02-12 DIAGNOSIS — Z78.0 POST-MENOPAUSAL: ICD-10-CM

## 2024-02-12 PROCEDURE — 77080 DXA BONE DENSITY AXIAL: CPT

## 2024-08-15 ENCOUNTER — OFFICE VISIT (OUTPATIENT)
Dept: PRIMARY CARE CLINIC | Facility: CLINIC | Age: 79
End: 2024-08-15

## 2024-08-15 VITALS
WEIGHT: 147 LBS | SYSTOLIC BLOOD PRESSURE: 152 MMHG | HEART RATE: 50 BPM | RESPIRATION RATE: 19 BRPM | BODY MASS INDEX: 29.64 KG/M2 | OXYGEN SATURATION: 96 % | DIASTOLIC BLOOD PRESSURE: 68 MMHG | HEIGHT: 59 IN

## 2024-08-15 DIAGNOSIS — I10 UNCONTROLLED HYPERTENSION: Primary | ICD-10-CM

## 2024-08-15 DIAGNOSIS — E11.9 CONTROLLED TYPE 2 DIABETES MELLITUS WITHOUT COMPLICATION, WITHOUT LONG-TERM CURRENT USE OF INSULIN (HCC): ICD-10-CM

## 2024-08-15 DIAGNOSIS — I10 UNCONTROLLED HYPERTENSION: ICD-10-CM

## 2024-08-15 LAB
ANION GAP SERPL CALC-SCNC: 5 MMOL/L (ref 5–15)
BUN SERPL-MCNC: 23 MG/DL (ref 6–20)
BUN/CREAT SERPL: 25 (ref 12–20)
CALCIUM SERPL-MCNC: 9.8 MG/DL (ref 8.5–10.1)
CHLORIDE SERPL-SCNC: 102 MMOL/L (ref 97–108)
CO2 SERPL-SCNC: 30 MMOL/L (ref 21–32)
CREAT SERPL-MCNC: 0.91 MG/DL (ref 0.55–1.02)
EST. AVERAGE GLUCOSE BLD GHB EST-MCNC: 166 MG/DL
GLUCOSE SERPL-MCNC: 174 MG/DL (ref 65–100)
HBA1C MFR BLD: 7.4 % (ref 4–5.6)
POTASSIUM SERPL-SCNC: 4.3 MMOL/L (ref 3.5–5.1)
SODIUM SERPL-SCNC: 137 MMOL/L (ref 136–145)

## 2024-08-15 RX ORDER — AMLODIPINE BESYLATE 5 MG/1
5 TABLET ORAL DAILY
Qty: 90 TABLET | Refills: 3 | Status: SHIPPED | OUTPATIENT
Start: 2024-08-15

## 2024-08-15 SDOH — ECONOMIC STABILITY: FOOD INSECURITY: WITHIN THE PAST 12 MONTHS, YOU WORRIED THAT YOUR FOOD WOULD RUN OUT BEFORE YOU GOT MONEY TO BUY MORE.: NEVER TRUE

## 2024-08-15 SDOH — ECONOMIC STABILITY: FOOD INSECURITY: WITHIN THE PAST 12 MONTHS, THE FOOD YOU BOUGHT JUST DIDN'T LAST AND YOU DIDN'T HAVE MONEY TO GET MORE.: NEVER TRUE

## 2024-08-15 SDOH — ECONOMIC STABILITY: INCOME INSECURITY: HOW HARD IS IT FOR YOU TO PAY FOR THE VERY BASICS LIKE FOOD, HOUSING, MEDICAL CARE, AND HEATING?: NOT HARD AT ALL

## 2024-08-15 ASSESSMENT — PATIENT HEALTH QUESTIONNAIRE - PHQ9
SUM OF ALL RESPONSES TO PHQ QUESTIONS 1-9: 0
SUM OF ALL RESPONSES TO PHQ9 QUESTIONS 1 & 2: 0
1. LITTLE INTEREST OR PLEASURE IN DOING THINGS: NOT AT ALL
SUM OF ALL RESPONSES TO PHQ QUESTIONS 1-9: 0
SUM OF ALL RESPONSES TO PHQ QUESTIONS 1-9: 0
2. FEELING DOWN, DEPRESSED OR HOPELESS: NOT AT ALL
SUM OF ALL RESPONSES TO PHQ QUESTIONS 1-9: 0

## 2024-08-15 NOTE — PROGRESS NOTES
HPI     Chief Complaint   Patient presents with    Follow-up    Diabetes       History of Present Illness  The patient is a 78-year-old female who is coming in for follow-up.    She has been monitoring her blood pressure at home, which was slightly elevated this morning. Typically, her readings are in the low 140s. She denies experiencing any chest pain, headaches, blurred vision, or shortness of breath.    She underwent a diabetic eye exam approximately a month ago under the care of Dr. Mak Rodriguez at [facility].     Reviewed PmHx, RxHx, FmHx, SocHx, AllgHx and updated and dated in the chart.    Physical Exam:  BP (!) 152/68   Pulse 50   Resp 19   Ht 1.499 m (4' 11\")   Wt 66.7 kg (147 lb)   SpO2 96%   BMI 29.69 kg/m²     Physical Exam  WNWD, NAD  Rajendra, regular rhythm, no murmur  CTA, no wheezes/ ronchi/ rales  Diabetic foot exam:     Left: Filament test normal sensation   Pulse PT: 2+ (normal)   Deformities: None  Right: Filament test normal sensation   Pulse PT: 2+ (normal)   Deformities: None  No focal deficits        Results          No results found for this or any previous visit (from the past 12 hour(s)).}        Assessment / Plan       ICD-10-CM    1. Uncontrolled hypertension  I10 Basic Metabolic Panel     amLODIPine (NORVASC) 5 MG tablet      2. Controlled type 2 diabetes mellitus without complication, without long-term current use of insulin ([facility])  E11.9 Hemoglobin A1C     metFORMIN (GLUCOPHAGE) 500 MG tablet      DIABETES FOOT EXAM           Assessment & Plan  1. Hypertension.  Her blood pressure readings are consistently above the desired range, with a target systolic value of less than 140. The dosage of amlodipine will be increased to 5 mg. She is advised to take two 2.5 mg tablets until the new prescription is obtained. The new prescription for 5 mg amlodipine will be sent to the pharmacy. Maintaining blood pressure below 140 is crucial to prevent potential kidney

## 2024-08-15 NOTE — PROGRESS NOTES
Health Decision Maker has been checked with the patient     Primary Decision Maker: Francisco Lockwood - Spouse - [phone]    Secondary Decision Maker: Dayan Lockwood - Child - [phone]       AI form was signed    Chief Complaint   Patient presents with    Follow-up    Diabetes       \"Have you been to the ER, urgent care clinic since your last visit?  Hospitalized since your last visit?\"    NO    “Have you seen or consulted any other health care providers outside of [facility] since your last visit?”    NO      Vitals:    08/15/24 0930   Weight: 66.7 kg (147 lb)      Depression: Not at risk (8/15/2024)    PHQ-2     PHQ-2 Score: 0              Click Here for Release of Records Request        Chart reviewed: immunizations are documented.   Immunization History   Administered Date(s) Administered    COVID-19, PFIZER PURPLE top, DILUTE for use, (age 12 y+), 30mcg/0.3mL 03/25/2021, 04/21/2021    Influenza, FLUAD, (age 65 y+), IM, Quadv, 0.5mL 08/31/2020    Influenza, FLUAD, (age 65 y+), IM, Trivalent PF, 0.5mL 09/05/2018, 09/05/2019    Influenza, High Dose (Fluzone 65 yrs and older) 09/01/2021    Pneumococcal Vaccine 02/07/2011    Pneumococcal, PCV-13, PREVNAR 13, (age 6w+), IM, 0.5mL 08/12/2015    TDaP, ADACEL (age 10y-64y), BOOSTRIX (age 10y+), IM, 0.5mL 07/14/2014    Zoster Live (Zostavax) 01/01/2012    Zoster Recombinant (Shingrix) 07/22/2020, 10/23/2020

## 2024-08-22 ENCOUNTER — APPOINTMENT (OUTPATIENT)
Facility: HOSPITAL | Age: 79
End: 2024-08-22
Payer: MEDICARE

## 2024-08-22 ENCOUNTER — OFFICE VISIT (OUTPATIENT)
Age: 79
End: 2024-08-22
Payer: MEDICARE

## 2024-08-22 ENCOUNTER — HOSPITAL ENCOUNTER (OUTPATIENT)
Facility: HOSPITAL | Age: 79
Setting detail: OBSERVATION
Discharge: HOME | End: 2024-08-23
Attending: EMERGENCY MEDICINE | Admitting: FAMILY MEDICINE
Payer: MEDICARE

## 2024-08-22 VITALS
OXYGEN SATURATION: 96 % | BODY MASS INDEX: 29.56 KG/M2 | RESPIRATION RATE: 19 BRPM | TEMPERATURE: 98.2 F | HEART RATE: 120 BPM | SYSTOLIC BLOOD PRESSURE: 137 MMHG | DIASTOLIC BLOOD PRESSURE: 85 MMHG | WEIGHT: 146.6 LBS | HEIGHT: 59 IN

## 2024-08-22 DIAGNOSIS — R79.89 ELEVATED TROPONIN: ICD-10-CM

## 2024-08-22 DIAGNOSIS — R00.0 SINUS TACHYCARDIA: Primary | ICD-10-CM

## 2024-08-22 DIAGNOSIS — D12.6 ADENOMATOUS POLYP OF COLON, UNSPECIFIED PART OF COLON: Primary | ICD-10-CM

## 2024-08-22 DIAGNOSIS — F41.9 ANXIETY: ICD-10-CM

## 2024-08-22 PROBLEM — E11.9 TYPE 2 DIABETES MELLITUS (HCC): Status: ACTIVE | Noted: 2024-08-22

## 2024-08-22 LAB
ALBUMIN SERPL-MCNC: 3.8 G/DL (ref 3.5–5)
ALBUMIN/GLOB SERPL: 1.1 (ref 1.1–2.2)
ALP SERPL-CCNC: 59 U/L (ref 45–117)
ALT SERPL-CCNC: 21 U/L (ref 12–78)
ANION GAP SERPL CALC-SCNC: 6 MMOL/L (ref 5–15)
APPEARANCE UR: CLEAR
AST SERPL-CCNC: 14 U/L (ref 15–37)
BACTERIA URNS QL MICRO: NEGATIVE /HPF
BASOPHILS # BLD: 0 K/UL (ref 0–0.1)
BASOPHILS NFR BLD: 1 % (ref 0–1)
BILIRUB SERPL-MCNC: 0.4 MG/DL (ref 0.2–1)
BILIRUB UR QL: NEGATIVE
BUN SERPL-MCNC: 22 MG/DL (ref 6–20)
BUN/CREAT SERPL: 22 (ref 12–20)
CALCIUM SERPL-MCNC: 9.7 MG/DL (ref 8.5–10.1)
CHLORIDE SERPL-SCNC: 103 MMOL/L (ref 97–108)
CO2 SERPL-SCNC: 26 MMOL/L (ref 21–32)
COLOR UR: ABNORMAL
COMMENT:: NORMAL
CREAT SERPL-MCNC: 1.02 MG/DL (ref 0.55–1.02)
DIFFERENTIAL METHOD BLD: ABNORMAL
EKG ATRIAL RATE: 132 BPM
EKG DIAGNOSIS: NORMAL
EKG P AXIS: 45 DEGREES
EKG P-R INTERVAL: 268 MS
EKG Q-T INTERVAL: 158 MS
EKG QRS DURATION: 84 MS
EKG QTC CALCULATION (BAZETT): 234 MS
EKG R AXIS: 54 DEGREES
EKG T AXIS: 225 DEGREES
EKG VENTRICULAR RATE: 132 BPM
EOSINOPHIL # BLD: 0 K/UL (ref 0–0.4)
EOSINOPHIL NFR BLD: 0 % (ref 0–7)
EPITH CASTS URNS QL MICRO: ABNORMAL /LPF
ERYTHROCYTE [DISTWIDTH] IN BLOOD BY AUTOMATED COUNT: 12.6 % (ref 11.5–14.5)
GLOBULIN SER CALC-MCNC: 3.4 G/DL (ref 2–4)
GLUCOSE SERPL-MCNC: 214 MG/DL (ref 65–100)
GLUCOSE UR STRIP.AUTO-MCNC: NEGATIVE MG/DL
HCT VFR BLD AUTO: 43.8 % (ref 35–47)
HGB BLD-MCNC: 14.5 G/DL (ref 11.5–16)
HGB UR QL STRIP: NEGATIVE
HYALINE CASTS URNS QL MICRO: ABNORMAL /LPF (ref 0–5)
IMM GRANULOCYTES # BLD AUTO: 0 K/UL (ref 0–0.04)
IMM GRANULOCYTES NFR BLD AUTO: 0 % (ref 0–0.5)
KETONES UR QL STRIP.AUTO: NEGATIVE MG/DL
LEUKOCYTE ESTERASE UR QL STRIP.AUTO: ABNORMAL
LYMPHOCYTES # BLD: 1 K/UL (ref 0.8–3.5)
LYMPHOCYTES NFR BLD: 11 % (ref 12–49)
MCH RBC QN AUTO: 31.7 PG (ref 26–34)
MCHC RBC AUTO-ENTMCNC: 33.1 G/DL (ref 30–36.5)
MCV RBC AUTO: 95.8 FL (ref 80–99)
MONOCYTES # BLD: 0.6 K/UL (ref 0–1)
MONOCYTES NFR BLD: 7 % (ref 5–13)
NEUTS SEG # BLD: 6.9 K/UL (ref 1.8–8)
NEUTS SEG NFR BLD: 81 % (ref 32–75)
NITRITE UR QL STRIP.AUTO: NEGATIVE
NRBC # BLD: 0 K/UL (ref 0–0.01)
NRBC BLD-RTO: 0 PER 100 WBC
PH UR STRIP: 6.5 (ref 5–8)
PLATELET # BLD AUTO: 274 K/UL (ref 150–400)
PMV BLD AUTO: 9.3 FL (ref 8.9–12.9)
POTASSIUM SERPL-SCNC: 4.4 MMOL/L (ref 3.5–5.1)
PROT SERPL-MCNC: 7.2 G/DL (ref 6.4–8.2)
PROT UR STRIP-MCNC: NEGATIVE MG/DL
RBC # BLD AUTO: 4.57 M/UL (ref 3.8–5.2)
RBC #/AREA URNS HPF: ABNORMAL /HPF (ref 0–5)
SODIUM SERPL-SCNC: 135 MMOL/L (ref 136–145)
SP GR UR REFRACTOMETRY: 1 (ref 1–1.03)
SPECIMEN HOLD: NORMAL
SPECIMEN HOLD: NORMAL
T4 FREE SERPL-MCNC: 0.9 NG/DL (ref 0.8–1.5)
TROPONIN I SERPL HS-MCNC: 39 NG/L (ref 0–51)
TROPONIN I SERPL HS-MCNC: 54 NG/L (ref 0–51)
TROPONIN I SERPL HS-MCNC: 72 NG/L (ref 0–51)
TSH SERPL DL<=0.05 MIU/L-ACNC: 1.06 UIU/ML (ref 0.36–3.74)
UROBILINOGEN UR QL STRIP.AUTO: 0.2 EU/DL (ref 0.2–1)
WBC # BLD AUTO: 8.6 K/UL (ref 3.6–11)
WBC URNS QL MICRO: ABNORMAL /HPF (ref 0–4)

## 2024-08-22 PROCEDURE — 36415 COLL VENOUS BLD VENIPUNCTURE: CPT

## 2024-08-22 PROCEDURE — 1123F ACP DISCUSS/DSCN MKR DOCD: CPT | Performed by: SURGERY

## 2024-08-22 PROCEDURE — 1036F TOBACCO NON-USER: CPT | Performed by: SURGERY

## 2024-08-22 PROCEDURE — 99285 EMERGENCY DEPT VISIT HI MDM: CPT

## 2024-08-22 PROCEDURE — 3079F DIAST BP 80-89 MM HG: CPT | Performed by: SURGERY

## 2024-08-22 PROCEDURE — 96361 HYDRATE IV INFUSION ADD-ON: CPT

## 2024-08-22 PROCEDURE — 99214 OFFICE O/P EST MOD 30 MIN: CPT | Performed by: SURGERY

## 2024-08-22 PROCEDURE — 85025 COMPLETE CBC W/AUTO DIFF WBC: CPT

## 2024-08-22 PROCEDURE — 80053 COMPREHEN METABOLIC PANEL: CPT

## 2024-08-22 PROCEDURE — 3075F SYST BP GE 130 - 139MM HG: CPT | Performed by: SURGERY

## 2024-08-22 PROCEDURE — G8419 CALC BMI OUT NRM PARAM NOF/U: HCPCS | Performed by: SURGERY

## 2024-08-22 PROCEDURE — 84443 ASSAY THYROID STIM HORMONE: CPT

## 2024-08-22 PROCEDURE — 81001 URINALYSIS AUTO W/SCOPE: CPT

## 2024-08-22 PROCEDURE — 84439 ASSAY OF FREE THYROXINE: CPT

## 2024-08-22 PROCEDURE — 1090F PRES/ABSN URINE INCON ASSESS: CPT | Performed by: SURGERY

## 2024-08-22 PROCEDURE — 96360 HYDRATION IV INFUSION INIT: CPT

## 2024-08-22 PROCEDURE — 6360000004 HC RX CONTRAST MEDICATION: Performed by: EMERGENCY MEDICINE

## 2024-08-22 PROCEDURE — 2580000003 HC RX 258: Performed by: EMERGENCY MEDICINE

## 2024-08-22 PROCEDURE — 84484 ASSAY OF TROPONIN QUANT: CPT

## 2024-08-22 PROCEDURE — 71275 CT ANGIOGRAPHY CHEST: CPT

## 2024-08-22 PROCEDURE — 71046 X-RAY EXAM CHEST 2 VIEWS: CPT

## 2024-08-22 PROCEDURE — 93005 ELECTROCARDIOGRAM TRACING: CPT

## 2024-08-22 PROCEDURE — G8399 PT W/DXA RESULTS DOCUMENT: HCPCS | Performed by: SURGERY

## 2024-08-22 PROCEDURE — 6370000000 HC RX 637 (ALT 250 FOR IP): Performed by: EMERGENCY MEDICINE

## 2024-08-22 PROCEDURE — G0378 HOSPITAL OBSERVATION PER HR: HCPCS

## 2024-08-22 PROCEDURE — G8427 DOCREV CUR MEDS BY ELIG CLIN: HCPCS | Performed by: SURGERY

## 2024-08-22 RX ORDER — DEXTROSE MONOHYDRATE 100 MG/ML
INJECTION, SOLUTION INTRAVENOUS CONTINUOUS PRN
Status: DISCONTINUED | OUTPATIENT
Start: 2024-08-22 | End: 2024-08-23 | Stop reason: HOSPADM

## 2024-08-22 RX ORDER — ACETAMINOPHEN 325 MG/1
650 TABLET ORAL EVERY 6 HOURS PRN
Status: DISCONTINUED | OUTPATIENT
Start: 2024-08-22 | End: 2024-08-23 | Stop reason: HOSPADM

## 2024-08-22 RX ORDER — METOPROLOL TARTRATE 25 MG/1
25 TABLET, FILM COATED ORAL ONCE
Status: COMPLETED | OUTPATIENT
Start: 2024-08-22 | End: 2024-08-22

## 2024-08-22 RX ORDER — VALSARTAN 40 MG/1
80 TABLET ORAL DAILY
Status: DISCONTINUED | OUTPATIENT
Start: 2024-08-23 | End: 2024-08-23 | Stop reason: HOSPADM

## 2024-08-22 RX ORDER — POTASSIUM CHLORIDE 7.45 MG/ML
10 INJECTION INTRAVENOUS PRN
Status: DISCONTINUED | OUTPATIENT
Start: 2024-08-22 | End: 2024-08-23 | Stop reason: HOSPADM

## 2024-08-22 RX ORDER — SODIUM CHLORIDE 0.9 % (FLUSH) 0.9 %
5-40 SYRINGE (ML) INJECTION PRN
Status: DISCONTINUED | OUTPATIENT
Start: 2024-08-22 | End: 2024-08-23 | Stop reason: HOSPADM

## 2024-08-22 RX ORDER — AMLODIPINE BESYLATE 5 MG/1
5 TABLET ORAL DAILY
Status: DISCONTINUED | OUTPATIENT
Start: 2024-08-23 | End: 2024-08-23 | Stop reason: HOSPADM

## 2024-08-22 RX ORDER — MAGNESIUM SULFATE IN WATER 40 MG/ML
2000 INJECTION, SOLUTION INTRAVENOUS PRN
Status: DISCONTINUED | OUTPATIENT
Start: 2024-08-22 | End: 2024-08-23 | Stop reason: HOSPADM

## 2024-08-22 RX ORDER — 0.9 % SODIUM CHLORIDE 0.9 %
1000 INTRAVENOUS SOLUTION INTRAVENOUS ONCE
Status: COMPLETED | OUTPATIENT
Start: 2024-08-22 | End: 2024-08-22

## 2024-08-22 RX ORDER — INSULIN LISPRO 100 [IU]/ML
0-4 INJECTION, SOLUTION INTRAVENOUS; SUBCUTANEOUS
Status: DISCONTINUED | OUTPATIENT
Start: 2024-08-23 | End: 2024-08-23 | Stop reason: HOSPADM

## 2024-08-22 RX ORDER — 0.9 % SODIUM CHLORIDE 0.9 %
1000 INTRAVENOUS SOLUTION INTRAVENOUS ONCE
Status: COMPLETED | OUTPATIENT
Start: 2024-08-22 | End: 2024-08-23

## 2024-08-22 RX ORDER — PAROXETINE 20 MG/1
15 TABLET, FILM COATED ORAL EVERY MORNING
Status: DISCONTINUED | OUTPATIENT
Start: 2024-08-23 | End: 2024-08-23

## 2024-08-22 RX ORDER — ROSUVASTATIN CALCIUM 10 MG/1
10 TABLET, COATED ORAL NIGHTLY
Status: DISCONTINUED | OUTPATIENT
Start: 2024-08-22 | End: 2024-08-23 | Stop reason: HOSPADM

## 2024-08-22 RX ORDER — ONDANSETRON 2 MG/ML
4 INJECTION INTRAMUSCULAR; INTRAVENOUS EVERY 6 HOURS PRN
Status: DISCONTINUED | OUTPATIENT
Start: 2024-08-22 | End: 2024-08-23 | Stop reason: HOSPADM

## 2024-08-22 RX ORDER — POTASSIUM CHLORIDE 750 MG/1
40 TABLET, EXTENDED RELEASE ORAL PRN
Status: DISCONTINUED | OUTPATIENT
Start: 2024-08-22 | End: 2024-08-23 | Stop reason: HOSPADM

## 2024-08-22 RX ORDER — PREDNISONE 10 MG/1
5 TABLET ORAL DAILY
Status: DISCONTINUED | OUTPATIENT
Start: 2024-08-23 | End: 2024-08-23 | Stop reason: HOSPADM

## 2024-08-22 RX ORDER — ONDANSETRON 4 MG/1
4 TABLET, ORALLY DISINTEGRATING ORAL EVERY 8 HOURS PRN
Status: DISCONTINUED | OUTPATIENT
Start: 2024-08-22 | End: 2024-08-23 | Stop reason: HOSPADM

## 2024-08-22 RX ORDER — IOPAMIDOL 755 MG/ML
100 INJECTION, SOLUTION INTRAVASCULAR
Status: COMPLETED | OUTPATIENT
Start: 2024-08-22 | End: 2024-08-22

## 2024-08-22 RX ORDER — ENOXAPARIN SODIUM 100 MG/ML
40 INJECTION SUBCUTANEOUS DAILY
Status: DISCONTINUED | OUTPATIENT
Start: 2024-08-23 | End: 2024-08-23 | Stop reason: HOSPADM

## 2024-08-22 RX ORDER — POLYETHYLENE GLYCOL 3350 17 G/17G
17 POWDER, FOR SOLUTION ORAL DAILY PRN
Status: DISCONTINUED | OUTPATIENT
Start: 2024-08-22 | End: 2024-08-23 | Stop reason: HOSPADM

## 2024-08-22 RX ORDER — METOPROLOL SUCCINATE 25 MG/1
25 TABLET, EXTENDED RELEASE ORAL DAILY
Status: DISCONTINUED | OUTPATIENT
Start: 2024-08-23 | End: 2024-08-23 | Stop reason: HOSPADM

## 2024-08-22 RX ORDER — INSULIN LISPRO 100 [IU]/ML
0-4 INJECTION, SOLUTION INTRAVENOUS; SUBCUTANEOUS NIGHTLY
Status: DISCONTINUED | OUTPATIENT
Start: 2024-08-22 | End: 2024-08-23 | Stop reason: HOSPADM

## 2024-08-22 RX ORDER — SODIUM CHLORIDE 0.9 % (FLUSH) 0.9 %
5-40 SYRINGE (ML) INJECTION EVERY 12 HOURS SCHEDULED
Status: DISCONTINUED | OUTPATIENT
Start: 2024-08-22 | End: 2024-08-23 | Stop reason: HOSPADM

## 2024-08-22 RX ORDER — ACETAMINOPHEN 650 MG/1
650 SUPPOSITORY RECTAL EVERY 6 HOURS PRN
Status: DISCONTINUED | OUTPATIENT
Start: 2024-08-22 | End: 2024-08-23 | Stop reason: HOSPADM

## 2024-08-22 RX ORDER — HYDROXYCHLOROQUINE SULFATE 200 MG/1
300 TABLET, FILM COATED ORAL DAILY
Status: DISCONTINUED | OUTPATIENT
Start: 2024-08-23 | End: 2024-08-23 | Stop reason: HOSPADM

## 2024-08-22 RX ORDER — SODIUM CHLORIDE 9 MG/ML
INJECTION, SOLUTION INTRAVENOUS PRN
Status: DISCONTINUED | OUTPATIENT
Start: 2024-08-22 | End: 2024-08-23 | Stop reason: HOSPADM

## 2024-08-22 RX ADMIN — IOPAMIDOL 70 ML: 755 INJECTION, SOLUTION INTRAVENOUS at 19:09

## 2024-08-22 RX ADMIN — METOPROLOL 25 MG: 25 TABLET ORAL at 20:20

## 2024-08-22 RX ADMIN — SODIUM CHLORIDE 1000 ML: 9 INJECTION, SOLUTION INTRAVENOUS at 16:39

## 2024-08-22 RX ADMIN — SODIUM CHLORIDE 1000 ML: 9 INJECTION, SOLUTION INTRAVENOUS at 18:19

## 2024-08-22 ASSESSMENT — PATIENT HEALTH QUESTIONNAIRE - PHQ9
SUM OF ALL RESPONSES TO PHQ QUESTIONS 1-9: 0
1. LITTLE INTEREST OR PLEASURE IN DOING THINGS: NOT AT ALL
SUM OF ALL RESPONSES TO PHQ QUESTIONS 1-9: 0
SUM OF ALL RESPONSES TO PHQ9 QUESTIONS 1 & 2: 0
SUM OF ALL RESPONSES TO PHQ QUESTIONS 1-9: 0
2. FEELING DOWN, DEPRESSED OR HOPELESS: NOT AT ALL
SUM OF ALL RESPONSES TO PHQ QUESTIONS 1-9: 0

## 2024-08-22 ASSESSMENT — ENCOUNTER SYMPTOMS
DIARRHEA: 0
ABDOMINAL DISTENTION: 0
VOMITING: 0
NAUSEA: 0
ABDOMINAL PAIN: 0
BLOOD IN STOOL: 0
CONSTIPATION: 0
SHORTNESS OF BREATH: 0

## 2024-08-22 ASSESSMENT — PAIN - FUNCTIONAL ASSESSMENT: PAIN_FUNCTIONAL_ASSESSMENT: NONE - DENIES PAIN

## 2024-08-22 NOTE — ED PROVIDER NOTES
[facility] EMERGENCY DEP  EMERGENCY DEPARTMENT ENCOUNTER      Pt Name: Patricia Lockwood  MRN: [id number]  Birthdate 1945  Date of evaluation: 8/22/2024  Provider: Rubi Bansal MD    CHIEF COMPLAINT     No chief complaint on file.        HISTORY OF PRESENT ILLNESS   (Location/Symptom, Timing/Onset, Context/Setting, Quality, Duration, Modifying Factors, Severity)  Note limiting factors.   HPI      Review of External Medical Records:     Nursing Notes were reviewed.    REVIEW OF SYSTEMS    (2-9 systems for level 4, 10 or more for level 5)     Review of Systems    Except as noted above the remainder of the review of systems was reviewed and negative.       PAST MEDICAL HISTORY     Past Medical History:   Diagnosis Date    Anxiety     began with menopause    Basal cell carcinoma of skin of face     Bronchitis 03/09/2019    Pt 1st [address]    Colon polyps     Dr carlisle, January 2015 normal colonoscopy, need  year follow up    Diabetes mellitus type 2, diet-controlled (HCC) 9/1/2015    Family history of skin cancer     sister- melanoma; son- several BCC    Hypercholesterolemia     Hypertension     IGT (impaired glucose tolerance)     pre diabetic    Osteopenia july 2015    PVC (premature ventricular contraction) February 2016    Dr Mahoney    Skin cancer March 2013    BCC, left cheek, Dr. Ziegler/Tamara [id number] evaluation    Sun-damaged skin     As a child and teen pt had bad sunburns    Sunburn, blistering          SURGICAL HISTORY       Past Surgical History:   Procedure Laterality Date    CARPAL TUNNEL RELEASE      bilat    COLONOSCOPY  12/09    5 years    COLONOSCOPY N/A 8/7/2023    COLONOSCOPY performed by Judi Katz Jr., MD at [facility] ENDOSCOPY    COLONOSCOPY N/A 8/7/2023    COLONOSCOPY POLYPECTOMY SNARE/COLD BIOPSY performed by Judi Katz Jr., MD at [facility] ENDOSCOPY    POLYPECTOMY  2000,2005    colon         CURRENT MEDICATIONS       Previous Medications    AMLODIPINE (NORVASC) 5 MG TABLET    Take 1  Vaping status: Never Used   Substance and Sexual Activity    Alcohol use: Not Currently    Drug use: No    Sexual activity: Defer     Social Determinants of Health     Financial Resource Strain: Low Risk  (8/15/2024)    Overall Financial Resource Strain (CARDIA)     Difficulty of Paying Living Expenses: Not hard at all   Food Insecurity: No Food Insecurity (8/15/2024)    Hunger Vital Sign     Worried About Running Out of Food in the Last Year: Never true     Ran Out of Food in the Last Year: Never true   Transportation Needs: Unknown (8/15/2024)    PRAPARE - Transportation     Lack of Transportation (Non-Medical): No   Physical Activity: Insufficiently Active (1/11/2024)    Exercise Vital Sign     Days of Exercise per Week: 1 day     Minutes of Exercise per Session: 30 min   Stress: No Stress Concern Present (8/14/2023)    [place] [employer] - Occupational Stress Questionnaire     Feeling of Stress : Not at all   Social Connections: Moderately Integrated (8/14/2023)    Social Connection and Isolation Panel [NHANES]     Frequency of Communication with Friends and Family: More than three times a week     Frequency of Social Gatherings with Friends and Family: Once a week     Attends Orthodox Services: More than 4 times per year     Active Member of Clubs or Organizations: No     Attends Club or Organization Meetings: Never     Marital Status:    Intimate Partner Violence: Not At Risk (8/14/2023)    Humiliation, Afraid, Rape, and Kick questionnaire     Fear of Current or Ex-Partner: No     Emotionally Abused: No     Physically Abused: No     Sexually Abused: No   Housing Stability: Unknown (8/15/2024)    Housing Stability Vital Sign     Homeless in the Last Year: No           PHYSICAL EXAM    (up to 7 for level 4, 8 or more for level 5)     ED Triage Vitals [08/22/24 1536]   BP Systolic BP Percentile Diastolic BP Percentile Temp Temp Source Pulse Respirations SpO2   125/82 -- -- 98 °F  Value    Neutrophils % 81 (*)     Lymphocytes % 11 (*)     All other components within normal limits   COMPREHENSIVE METABOLIC PANEL - Abnormal; Notable for the following components:    Sodium 135 (*)     Glucose 214 (*)     BUN 22 (*)     BUN/Creatinine Ratio 22 (*)     Est, Glom Filt Rate 56 (*)     AST 14 (*)     All other components within normal limits   TROPONIN - Abnormal; Notable for the following components:    Troponin, High Sensitivity 54 (*)     All other components within normal limits   URINALYSIS WITH MICROSCOPIC - Abnormal; Notable for the following components:    Leukocyte Esterase, Urine TRACE (*)     All other components within normal limits   URINE CULTURE HOLD SAMPLE   EXTRA TUBES HOLD   TROPONIN   TSH + FREE T4 PANEL       All other labs were within normal range or not returned as of this dictation.    EMERGENCY DEPARTMENT COURSE and DIFFERENTIAL DIAGNOSIS/MDM:   Vitals:    Vitals:    08/22/24 1800 08/22/24 1801 08/22/24 1830 08/22/24 1835   BP: 117/78  109/68    Pulse:  (!) 116  (!) 115   Resp: 19 11 16    Temp:       TempSrc:       SpO2: 96% 96% 94% 96%   Weight:       Height:               Medical Decision Making  Amount and/or Complexity of Data Reviewed  Labs: ordered.  Radiology: ordered.  ECG/medicine tests:  Decision-making details documented in ED Course.    Risk  Prescription drug management.  Decision regarding hospitalization.            REASSESSMENT     ED Course as of 08/22/24 1849   Thu Aug 22, 2024   1655 EKG 12 Lead (Abn HR)  Normal sinus rhythm, tachycardia, 132 bpm, no STEMI, no ectopy, normal axis [IO]      ED Course User Index  [IO] Rubi Bansal MD           CONSULTS:  None    PROCEDURES:  Unless otherwise noted below, none     Procedures      FINAL IMPRESSION      1. Sinus tachycardia    2. Elevated troponin          DISPOSITION/PLAN   DISPOSITION Decision To Admit 08/22/2024 06:48:45 PM    Perfect Serve Consult for Admission  6:49 PM    ED Room Number:

## 2024-08-22 NOTE — PROGRESS NOTES
Identified pt with two pt identifiers (name and ). Reviewed chart in preparation for visit and have obtained necessary documentation.    Patricia Lockwood is a 78 y.o. female No chief complaint on file.  .    Vitals:    24 1407   BP: (!) 158/84   Site: Left Upper Arm   Position: Sitting   Cuff Size: Large Adult   Pulse: (!) 148   Resp: 19   Temp: 98.2 °F (36.8 °C)   TempSrc: Oral   SpO2: 96%   Weight: 66.5 kg (146 lb 9.6 oz)   Height: 1.499 m (4' 11\")      Rechecked blood pressure  137/85 left upper arm  Pulse 140    1. Have you been to the ER, urgent care clinic since your last visit?  Hospitalized since your last visit?  no     2. Have you seen or consulted any other health care providers outside of the [facility] since your last visit?  Include any pap smears or colon screening.  no  
Rechecked patients heart rate, rate was 120 and bounding, patient stated that she could feel it beating in her chest advised to go to ED.  MD bergman  
200 MG tablet, Take 1 tablet by mouth 2 times daily Taking 1 1/2 tablets per day, Disp: , Rfl:     predniSONE (DELTASONE) 5 MG tablet, Take 1 tablet by mouth daily Taking one 5 mg tablet per day, Disp: , Rfl:   Past Medical History:   Diagnosis Date    Anxiety     began with menopause    Basal cell carcinoma of skin of face     Bronchitis 03/09/2019    Pt 1st [address]    Colon polyps     Dr carlisle, January 2015 normal colonoscopy, need  year follow up    Diabetes mellitus type 2, diet-controlled (HCC) 9/1/2015    Family history of skin cancer     sister- melanoma; son- several BCC    Hypercholesterolemia     Hypertension     IGT (impaired glucose tolerance)     pre diabetic    Osteopenia july 2015    PVC (premature ventricular contraction) February 2016    Dr Mahoney    Skin cancer March 2013    BCC, left cheek, Dr. Ziegler/Tamara [id number] evaluation    Sun-damaged skin     As a child and teen pt had bad sunburns    Sunburn, blistering      Past Surgical History:   Procedure Laterality Date    CARPAL TUNNEL RELEASE      bilat    COLONOSCOPY  12/09    5 years    COLONOSCOPY N/A 8/7/2023    COLONOSCOPY performed by Judi Katz Jr., MD at [facility] ENDOSCOPY    COLONOSCOPY N/A 8/7/2023    COLONOSCOPY POLYPECTOMY SNARE/COLD BIOPSY performed by Judi Katz Jr., MD at [facility] ENDOSCOPY    POLYPECTOMY  2000,2005    colon     Social History     Tobacco Use    Smoking status: Never    Smokeless tobacco: Never   Vaping Use    Vaping status: Never Used   Substance Use Topics    Alcohol use: Not Currently    Drug use: No     Family History   Problem Relation Age of Onset    Colon Cancer Maternal Grandmother     Dementia Maternal Aunt         all 5 mat aunts    Stroke Mother     Colon Cancer Paternal Grandfather     Crohn's Disease Daughter     Dementia Mother     Other Father         Parkinsons    Cancer Mother 45        uterine        Review of Systems   Constitutional:  Negative for appetite change, chills and fever.

## 2024-08-22 NOTE — ASSESSMENT & PLAN NOTE
Recommend repeat colonoscopy given large aggressive polyp.  She would like to defer until January.  This is reasonable.

## 2024-08-22 NOTE — ED NOTES
3:38 PM  I have evaluated the patient as the Provider in Rapid Medical Evaluation (RME). I have reviewed her vital signs and the triage nurse assessment. I have talked with the patient and any available family and advised that I am the provider in triage and have ordered the appropriate study to initiate their work up based on the clinical presentation during my assessment. I have advised that the patient will be accommodated in the Main ED as soon as possible. I have also requested to contact the triage nurse or myself immediately if the patient experiences any changes in their condition during this brief waiting period.    Patricia Lockwood is a 78 y.o. female with history of  has a past medical history of Anxiety, Basal cell carcinoma of skin of face, Bronchitis (03/09/2019), Colon polyps, Diabetes mellitus type 2, diet-controlled (HCC) (9/1/2015), Family history of skin cancer, Hypercholesterolemia, Hypertension, IGT (impaired glucose tolerance), Osteopenia (july 2015), PVC (premature ventricular contraction) (February 2016), Skin cancer (March 2013), Sun-damaged skin, and Sunburn, blistering. who presents from home to [facility] ED with cc of heart palpitations beginning today at her appointment with general surgery.  Denies chest pain or shortness of breath.  Reports previous similar episodes however this 1 has lasted longer than normal.  No history of A-fib.            PCP: Ruma Gibson, DO    There are no other complaints, changes or physical findings at this time.    NICHOLE Recinos Tara E, PA-C  08/22/24 6475

## 2024-08-22 NOTE — ED TRIAGE NOTES
Patient is coming over from Dr. Hanson office for rapid HR that started in the office. Patient states has happened in the past but has never lasted this long. Denies SOB or chest pain.

## 2024-08-22 NOTE — ASSESSMENT & PLAN NOTE
She has asked if when she has the procedure she can have an anxiety pill.  I will defer to Dr. Gibson and she will reach out closer to the time.

## 2024-08-23 ENCOUNTER — APPOINTMENT (OUTPATIENT)
Facility: HOSPITAL | Age: 79
End: 2024-08-23
Attending: FAMILY MEDICINE
Payer: MEDICARE

## 2024-08-23 VITALS
HEART RATE: 56 BPM | DIASTOLIC BLOOD PRESSURE: 56 MMHG | WEIGHT: 146.39 LBS | RESPIRATION RATE: 18 BRPM | HEIGHT: 59 IN | SYSTOLIC BLOOD PRESSURE: 145 MMHG | BODY MASS INDEX: 29.51 KG/M2 | TEMPERATURE: 97.9 F | OXYGEN SATURATION: 95 %

## 2024-08-23 LAB
ANION GAP SERPL CALC-SCNC: 2 MMOL/L (ref 5–15)
BASOPHILS # BLD: 0.1 K/UL (ref 0–0.1)
BASOPHILS NFR BLD: 1 % (ref 0–1)
BUN SERPL-MCNC: 15 MG/DL (ref 6–20)
BUN/CREAT SERPL: 18 (ref 12–20)
CALCIUM SERPL-MCNC: 8.6 MG/DL (ref 8.5–10.1)
CHLORIDE SERPL-SCNC: 109 MMOL/L (ref 97–108)
CHOLEST SERPL-MCNC: 171 MG/DL
CO2 SERPL-SCNC: 27 MMOL/L (ref 21–32)
CREAT SERPL-MCNC: 0.85 MG/DL (ref 0.55–1.02)
DIFFERENTIAL METHOD BLD: NORMAL
ECHO AO ROOT DIAM: 2.3 CM
ECHO AO ROOT INDEX: 1.43 CM/M2
ECHO AV AREA PEAK VELOCITY: 1.3 CM2
ECHO AV AREA VTI: 1.2 CM2
ECHO AV AREA/BSA PEAK VELOCITY: 0.8 CM2/M2
ECHO AV AREA/BSA VTI: 0.7 CM2/M2
ECHO AV MEAN GRADIENT: 8 MMHG
ECHO AV MEAN VELOCITY: 1.3 M/S
ECHO AV PEAK GRADIENT: 13 MMHG
ECHO AV PEAK VELOCITY: 1.8 M/S
ECHO AV VELOCITY RATIO: 0.56
ECHO AV VTI: 46.9 CM
ECHO BSA: 1.66 M2
ECHO LA DIAMETER INDEX: 1.74 CM/M2
ECHO LA DIAMETER: 2.8 CM
ECHO LA TO AORTIC ROOT RATIO: 1.22
ECHO LA VOL A-L A2C: 31 ML (ref 22–52)
ECHO LA VOL A-L A4C: 40 ML (ref 22–52)
ECHO LA VOL MOD A2C: 30 ML (ref 22–52)
ECHO LA VOL MOD A4C: 37 ML (ref 22–52)
ECHO LA VOLUME AREA LENGTH: 36 ML
ECHO LA VOLUME INDEX A-L A2C: 19 ML/M2 (ref 16–34)
ECHO LA VOLUME INDEX A-L A4C: 25 ML/M2 (ref 16–34)
ECHO LA VOLUME INDEX AREA LENGTH: 22 ML/M2 (ref 16–34)
ECHO LA VOLUME INDEX MOD A2C: 19 ML/M2 (ref 16–34)
ECHO LA VOLUME INDEX MOD A4C: 23 ML/M2 (ref 16–34)
ECHO LV E' LATERAL VELOCITY: 5 CM/S
ECHO LV E' SEPTAL VELOCITY: 6 CM/S
ECHO LV EDV A2C: 70 ML
ECHO LV EDV A4C: 77 ML
ECHO LV EDV BP: 77 ML (ref 56–104)
ECHO LV EDV INDEX A4C: 48 ML/M2
ECHO LV EDV INDEX BP: 48 ML/M2
ECHO LV EDV NDEX A2C: 43 ML/M2
ECHO LV EJECTION FRACTION A2C: 60 %
ECHO LV EJECTION FRACTION A4C: 56 %
ECHO LV EJECTION FRACTION BIPLANE: 58 % (ref 55–100)
ECHO LV ESV A2C: 28 ML
ECHO LV ESV A4C: 34 ML
ECHO LV ESV BP: 32 ML (ref 19–49)
ECHO LV ESV INDEX A2C: 17 ML/M2
ECHO LV ESV INDEX A4C: 21 ML/M2
ECHO LV ESV INDEX BP: 20 ML/M2
ECHO LV FRACTIONAL SHORTENING: 28 % (ref 28–44)
ECHO LV INTERNAL DIMENSION DIASTOLE INDEX: 3.11 CM/M2
ECHO LV INTERNAL DIMENSION DIASTOLIC: 5 CM (ref 3.9–5.3)
ECHO LV INTERNAL DIMENSION SYSTOLIC INDEX: 2.24 CM/M2
ECHO LV INTERNAL DIMENSION SYSTOLIC: 3.6 CM
ECHO LV IVSD: 0.7 CM (ref 0.6–0.9)
ECHO LV MASS 2D: 169.9 G (ref 67–162)
ECHO LV MASS INDEX 2D: 105.5 G/M2 (ref 43–95)
ECHO LV POSTERIOR WALL DIASTOLIC: 1.2 CM (ref 0.6–0.9)
ECHO LV RELATIVE WALL THICKNESS RATIO: 0.48
ECHO LVOT AREA: 2.3 CM2
ECHO LVOT AV VTI INDEX: 0.53
ECHO LVOT DIAM: 1.7 CM
ECHO LVOT MEAN GRADIENT: 2 MMHG
ECHO LVOT PEAK GRADIENT: 4 MMHG
ECHO LVOT PEAK VELOCITY: 1 M/S
ECHO LVOT STROKE VOLUME INDEX: 35.2 ML/M2
ECHO LVOT SV: 56.7 ML
ECHO LVOT VTI: 25 CM
ECHO MV A VELOCITY: 1.1 M/S
ECHO MV AREA PHT: 2.8 CM2
ECHO MV E DECELERATION TIME (DT): 267.9 MS
ECHO MV E VELOCITY: 0.95 M/S
ECHO MV E/A RATIO: 0.86
ECHO MV E/E' LATERAL: 19
ECHO MV E/E' RATIO (AVERAGED): 17.42
ECHO MV E/E' SEPTAL: 15.83
ECHO MV PRESSURE HALF TIME (PHT): 77.7 MS
ECHO PV MAX VELOCITY: 0.9 M/S
ECHO PV PEAK GRADIENT: 3 MMHG
ECHO RV TAPSE: 2.9 CM (ref 1.7–?)
EOSINOPHIL # BLD: 0.2 K/UL (ref 0–0.4)
EOSINOPHIL NFR BLD: 3 % (ref 0–7)
ERYTHROCYTE [DISTWIDTH] IN BLOOD BY AUTOMATED COUNT: 12.9 % (ref 11.5–14.5)
EST. AVERAGE GLUCOSE BLD GHB EST-MCNC: 169 MG/DL
GLUCOSE BLD STRIP.AUTO-MCNC: 137 MG/DL (ref 65–117)
GLUCOSE BLD STRIP.AUTO-MCNC: 166 MG/DL (ref 65–117)
GLUCOSE BLD STRIP.AUTO-MCNC: 326 MG/DL (ref 65–117)
GLUCOSE SERPL-MCNC: 158 MG/DL (ref 65–100)
HBA1C MFR BLD: 7.5 % (ref 4–5.6)
HCT VFR BLD AUTO: 37.7 % (ref 35–47)
HDLC SERPL-MCNC: 129 MG/DL
HDLC SERPL: 1.3 (ref 0–5)
HGB BLD-MCNC: 12.2 G/DL (ref 11.5–16)
IMM GRANULOCYTES # BLD AUTO: 0 K/UL (ref 0–0.04)
IMM GRANULOCYTES NFR BLD AUTO: 0 % (ref 0–0.5)
LDLC SERPL CALC-MCNC: 27 MG/DL (ref 0–100)
LYMPHOCYTES # BLD: 2.2 K/UL (ref 0.8–3.5)
LYMPHOCYTES NFR BLD: 27 % (ref 12–49)
MCH RBC QN AUTO: 31.6 PG (ref 26–34)
MCHC RBC AUTO-ENTMCNC: 32.4 G/DL (ref 30–36.5)
MCV RBC AUTO: 97.7 FL (ref 80–99)
MONOCYTES # BLD: 0.8 K/UL (ref 0–1)
MONOCYTES NFR BLD: 10 % (ref 5–13)
NEUTS SEG # BLD: 4.8 K/UL (ref 1.8–8)
NEUTS SEG NFR BLD: 59 % (ref 32–75)
NRBC # BLD: 0 K/UL (ref 0–0.01)
NRBC BLD-RTO: 0 PER 100 WBC
PLATELET # BLD AUTO: 222 K/UL (ref 150–400)
PMV BLD AUTO: 9.3 FL (ref 8.9–12.9)
POTASSIUM SERPL-SCNC: 3.8 MMOL/L (ref 3.5–5.1)
RBC # BLD AUTO: 3.86 M/UL (ref 3.8–5.2)
SERVICE CMNT-IMP: ABNORMAL
SODIUM SERPL-SCNC: 138 MMOL/L (ref 136–145)
TRIGL SERPL-MCNC: 75 MG/DL
TROPONIN I SERPL HS-MCNC: 76 NG/L (ref 0–51)
VLDLC SERPL CALC-MCNC: 15 MG/DL
WBC # BLD AUTO: 8 K/UL (ref 3.6–11)

## 2024-08-23 PROCEDURE — G0378 HOSPITAL OBSERVATION PER HR: HCPCS

## 2024-08-23 PROCEDURE — 80048 BASIC METABOLIC PNL TOTAL CA: CPT

## 2024-08-23 PROCEDURE — 83036 HEMOGLOBIN GLYCOSYLATED A1C: CPT

## 2024-08-23 PROCEDURE — 82962 GLUCOSE BLOOD TEST: CPT

## 2024-08-23 PROCEDURE — 85025 COMPLETE CBC W/AUTO DIFF WBC: CPT

## 2024-08-23 PROCEDURE — 36415 COLL VENOUS BLD VENIPUNCTURE: CPT

## 2024-08-23 PROCEDURE — 93306 TTE W/DOPPLER COMPLETE: CPT

## 2024-08-23 PROCEDURE — 6360000002 HC RX W HCPCS: Performed by: FAMILY MEDICINE

## 2024-08-23 PROCEDURE — 2580000003 HC RX 258: Performed by: FAMILY MEDICINE

## 2024-08-23 PROCEDURE — 84484 ASSAY OF TROPONIN QUANT: CPT

## 2024-08-23 PROCEDURE — 96361 HYDRATE IV INFUSION ADD-ON: CPT

## 2024-08-23 PROCEDURE — 99223 1ST HOSP IP/OBS HIGH 75: CPT | Performed by: INTERNAL MEDICINE

## 2024-08-23 PROCEDURE — 96372 THER/PROPH/DIAG INJ SC/IM: CPT

## 2024-08-23 PROCEDURE — 93306 TTE W/DOPPLER COMPLETE: CPT | Performed by: INTERNAL MEDICINE

## 2024-08-23 PROCEDURE — 80061 LIPID PANEL: CPT

## 2024-08-23 PROCEDURE — 6370000000 HC RX 637 (ALT 250 FOR IP): Performed by: FAMILY MEDICINE

## 2024-08-23 RX ORDER — PAROXETINE 20 MG/1
5 TABLET, FILM COATED ORAL EVERY MORNING
Status: DISCONTINUED | OUTPATIENT
Start: 2024-08-24 | End: 2024-08-23 | Stop reason: HOSPADM

## 2024-08-23 RX ADMIN — ENOXAPARIN SODIUM 40 MG: 100 INJECTION SUBCUTANEOUS at 08:31

## 2024-08-23 RX ADMIN — HYDROXYCHLOROQUINE SULFATE 300 MG: 200 TABLET ORAL at 08:28

## 2024-08-23 RX ADMIN — Medication 10 ML: at 08:31

## 2024-08-23 RX ADMIN — PAROXETINE HYDROCHLORIDE 15 MG: 20 TABLET, FILM COATED ORAL at 08:29

## 2024-08-23 RX ADMIN — INSULIN LISPRO 3 UNITS: 100 INJECTION, SOLUTION INTRAVENOUS; SUBCUTANEOUS at 13:00

## 2024-08-23 RX ADMIN — VALSARTAN 80 MG: 40 TABLET, FILM COATED ORAL at 08:29

## 2024-08-23 RX ADMIN — Medication 10 ML: at 00:00

## 2024-08-23 RX ADMIN — AMLODIPINE BESYLATE 5 MG: 5 TABLET ORAL at 08:30

## 2024-08-23 RX ADMIN — PREDNISONE 5 MG: 10 TABLET ORAL at 08:29

## 2024-08-23 NOTE — ED NOTES
ED TO INPATIENT SBAR HANDOFF    Patient Name: Patricia Lockwood   :  1945  78 y.o.   MRN:  [id number]  ED Room #:  ER08/08     Situation  Code Status: Full Code   Allergies: Sulfa antibiotics and Penicillins  Weight: Patient Vitals for the past 96 hrs (Last 3 readings):   Weight   24 1536 66.4 kg (146 lb 6.2 oz)       Arrived from: home    Chief Complaint: No chief complaint on file.      Hospital Problem/Diagnosis:  Principal Problem:    Elevated troponin  Resolved Problems:    * No resolved hospital problems. *      Mobility: uses cane at baseline  ED Fall Risk: Presents to emergency department  because of falls (Syncope, seizure, or loss of consciousness): No, Age > 70: Yes, Altered Mental Status, Intoxication with alcohol or substance confusion (Disorientation, impaired judgment, poor safety awaremess, or inability to follow instructions): No, Impaired Mobility: Ambulates or transfers with assistive devices or assistance; Unable to ambulate or transer.: No, Nursing Judgement: No   Fell in ED or prior to admission: no   Restraints: no     Sitter: no   Family/Caregiver Present: yes    Neet to know social/safety information: n/a    Background  History:   Past Medical History:   Diagnosis Date    Anxiety     began with menopause    Basal cell carcinoma of skin of face     Bronchitis 2019    Pt 1st [address] Derrek    Colon polyps     Dr carlisle, 2015 normal colonoscopy, need  year follow up    Diabetes mellitus type 2, diet-controlled (HCC) 2015    Family history of skin cancer     sister- melanoma; son- several BCC    Hypercholesterolemia     Hypertension     IGT (impaired glucose tolerance)     pre diabetic    Osteopenia 2015    PVC (premature ventricular contraction) 2016    Dr Mahoney    Skin cancer 2013    BCC, left cheek, Dr. Ziegler/Tamara [id number] evaluation    Sun-damaged skin     As a child and teen pt had bad sunburns    Sunburn, blistering

## 2024-08-23 NOTE — DISCHARGE INSTRUCTIONS
Discharge Instructions       PATIENT ID: Patricia Lockwood  MRN: [id number]   YOB: 1945    DATE OF ADMISSION: 8/22/2024   DATE OF DISCHARGE: 8/23/2024    PRIMARY CARE PROVIDER: Ruma Gibson     ATTENDING PHYSICIAN: Chele Teixeira MD   DISCHARGING PROVIDER: MARYANA Prieto NP    To contact this individual call [phone] and ask the  to page.   If unavailable ask to be transferred the Adult Hospitalist Department.    DISCHARGE DIAGNOSES tachycardia    CONSULTATIONS: IP CONSULT TO CARDIOLOGY    PROCEDURES/SURGERIES: * No surgery found *    PENDING TEST RESULTS:   At the time of discharge the following test results are still pending:     FOLLOW UP APPOINTMENTS:   Follow-up Information         Follow up With Specialties Details Why Contact Info     Ruma Gibson DO Family Medicine Follow up in 1 week(s)   [address] [place], [place] [id number] [phone]        Ruma Gibson DO Family Medicine     [address] [place], [place] [id number] [phone]        Yue Owens MD Cardiology, Interventional Cardiology Follow up on 1/15/2025   [address] [place], [place] [id number] [phone]                      ADDITIONAL CARE RECOMMENDATIONS: Exercise, swimming  Take blood pressure and pulse daily.  Hold metoprolol if heart rate less than 60     DIET: regular diet        ACTIVITY: activity as tolerated      DISCHARGE MEDICATIONS:   See Medication Reconciliation Form    It is important that you take the medication exactly as they are prescribed.   Keep your medication in the bottles provided by the pharmacist and keep a list of the medication names, dosages, and times to be taken in your wallet.   Do not take other medications without consulting your doctor.       NOTIFY YOUR PHYSICIAN FOR ANY OF THE FOLLOWING:   Fever over 101 degrees for 24 hours.   Chest pain, shortness of breath, fever, chills, nausea, vomiting, diarrhea,

## 2024-08-23 NOTE — PLAN OF CARE
Problem: Discharge Planning  Goal: Discharge to home or other facility with appropriate resources  Outcome: HH/HSPC Resolved Met     Problem: Safety - Adult  Goal: Free from fall injury  Outcome: HH/HSPC Resolved Met     Problem: Chronic Conditions and Co-morbidities  Goal: Patient's chronic conditions and co-morbidity symptoms are monitored and maintained or improved  Outcome: HH/HSPC Resolved Met

## 2024-08-23 NOTE — DISCHARGE SUMMARY
Discharge Summary       PATIENT ID: Patricia Lockwood  MRN: [id number]   YOB: 1945    DATE OF ADMISSION: 8/22/2024  4:26 PM    DATE OF DISCHARGE: 8/23/2024   PRIMARY CARE PROVIDER: Ruma Gibson DO     ATTENDING PHYSICIAN: KRZYSZTOF Teixeira MD  DISCHARGING PROVIDER: MARYANA Prieto - NP    To contact this individual call [phone] and ask the  to page.  If unavailable ask to be transferred the Adult Hospitalist Department.    CONSULTATIONS: IP CONSULT TO CARDIOLOGY    PROCEDURES/SURGERIES: * No surgery found *     ADMITTING DIAGNOSES & HOSPITAL COURSE:   Patricia Lockwood is a 78 y.o. female with a pmhx basal cell carcinoma of skin, dyslipidiemia, HTN, DM II, and anxiety who presents with, and is being admitted for tachycardia and elevated troponin.       In the ED, HR was initially elevated to 129, now 66.  EKG showed sinus tachycardia with first degree AV block, and diffuse ST depression.  Labs showed troponin 39>56,and glucose 214.  CXR showed indeterminate nodular opacity along the right lower lobe.  CTA thorax showed no PE, and probable right apical subpleural bronchogenic cyst.     In the ED, she received metoprolol, and 2L normal saline bolus.        DISCHARGE DIAGNOSES / PLAN:      tachycardia  -max , EKG sinus rhythm with first degree AVB and diffuse ST depressions  -HR improved to 66 bpm after 2L normal saline  -TSH 1.06, spo2 95-97% on RA, CTA thorax without PE had been given metoprolol, it did get episode of bradycardia in the morning however subsequently resolved.  Troponin elevation due to tachycardia.  Has been seen by cardiology, with AVNRT       HTN  Stable  Continuing metoprolol    Dyslipidemia  Continuing rosuvastatin         ? Apical bronchogenic cyst  RLL nodular opacity  -OP follow up     DM II with hyperglycemia  -glucose 214  -Continuing home regimen     RA  -followed by [facility] rheumatology  -continue home prednisone, and hydroxychloroquine       PENDING TEST  RESULTS:   At the time of discharge the following test results are still pending:     FOLLOW UP APPOINTMENTS:    Follow-up Information       Follow up With Specialties Details Why Contact Info    Ruma Gibson DO Family Medicine Follow up in 1 week(s)  [address] [facility] [address]  [facility] [id number] [phone]      Ruma Gibson DO Family Medicine   [id number] [place] [address] [address]  [place] [id number] [phone]      Yue Owens MD Cardiology, Interventional Cardiology Follow up on 1/15/2025  [address] [place] [address] [address]  [place] [id number] [phone]                ADDITIONAL CARE RECOMMENDATIONS: Exercise, swimming    DIET: regular diet      ACTIVITY: activity as tolerated          DISCHARGE MEDICATIONS:     Medication List        CONTINUE taking these medications      amLODIPine 5 MG tablet  Commonly known as: NORVASC  Take 1 tablet by mouth daily     blood glucose test strips  Test 2 times a day & as needed for symptoms of irregular blood glucose. Dispense sufficient amount for indicated testing frequency plus additional to accommodate PRN testing needs. [employer] True Metrix Test Strips     CALCIUM PO     cyanocobalamin 1000 MCG tablet     hydroxychloroquine 200 MG tablet  Commonly known as: PLAQUENIL     Lancets Misc  1 each by Does not apply route daily [employer] Ultra Thin Lancets     metFORMIN 500 MG tablet  Commonly known as: GLUCOPHAGE  Take 1 tablet by mouth 2 times daily (with meals)     metoprolol succinate 25 MG extended release tablet  Commonly known as: TOPROL XL  Take 1 tablet by mouth daily     MULTIVITAMIN PO     PARoxetine 10 MG tablet  Commonly known as: PAXIL  Take 1.5 tablets by mouth every morning     predniSONE 5 MG tablet  Commonly known as: DELTASONE     PROBIOTIC ACIDOPHILUS PO     rosuvastatin 10 MG tablet  Commonly known as: CRESTOR  Take 1 tablet by mouth nightly     valsartan 80 MG tablet  Commonly known as: DIOVAN  Take 1 tablet by mouth daily     vitamin D 50

## 2024-08-23 NOTE — PROGRESS NOTES
TRANSFER - IN REPORT:    Verbal report received from E tranfer note on Patricia Lockwood  being received from ED for routine progression of patient care      Report consisted of patient's Situation, Background, Assessment and   Recommendations(SBAR).     Information from the following report(s) Nurse Handoff Report and ED SBAR was reviewed with the receiving nurse.    Opportunity for questions and clarification was provided.      Assessment completed upon patient's arrival to unit and care assumed.

## 2024-08-23 NOTE — H&P
History and Physical    Date of Service:  8/22/2024  Primary Care Provider: Ruma Gibson DO  Source of information: patient, electronic medical record    Chief Complaint: No chief complaint on file.      History of Presenting Illness:   Patricia Lockwood is a 78 y.o. female with a pmhx basal cell carcinoma of skin, dyslipidiemia, HTN, DM II, and anxiety who presents with, and is being admitted for tachycardia and elevated troponin.      In the ED, HR was initially elevated to 129, now 66.  EKG showed sinus tachycardia with first degree AV block, and diffuse ST depression.  Labs showed troponin 39>56,and glucose 214.  CXR showed indeterminate nodular opacity along the right lower lobe.  CTA thorax showed no PE, and probable right apical subpleural bronchogenic cyst.    In the ED, she received metoprolol, and 2L normal saline bolus.     REVIEW OF SYSTEMS:  A comprehensive review of systems was negative except for that written in the History of Present Illness.     Past Medical History:   Diagnosis Date    Anxiety     began with menopause    Basal cell carcinoma of skin of face     Bronchitis 03/09/2019    Pt 1st [address] [facility]    Colon polyps     Dr carlisle, January 2015 normal colonoscopy, need  year follow up    Diabetes mellitus type 2, diet-controlled (HCC) 9/1/2015    Family history of skin cancer     sister- melanoma; son- several BCC    Hypercholesterolemia     Hypertension     IGT (impaired glucose tolerance)     pre diabetic    Osteopenia july 2015    PVC (premature ventricular contraction) February 2016    Dr Mahoney    Skin cancer March 2013    BCC, left cheek, Dr. Ziegler/Tamara [id number] evaluation    Sun-damaged skin     As a child and teen pt had bad sunburns    Sunburn, blistering       Past Surgical History:   Procedure Laterality Date    CARPAL TUNNEL RELEASE      bilat    COLONOSCOPY  12/09    5 years    COLONOSCOPY N/A 8/7/2023    COLONOSCOPY performed by Judi Katz Jr., MD at [facility]

## 2024-08-23 NOTE — CONSULTS
aneurysm.  Probable right apical subpleural bronchogenic cyst measuring up to 2.9 x 3.1 x  3.6 cm        Electronically signed by KRYSTAL SEN    Xray Result (most recent):  XR CHEST STANDARD TWO VW 08/22/2024    Narrative  EXAM: XR CHEST (2 VW)    INDICATION: tachycardia    COMPARISON: 9/20/2022 radiograph send 3/14/2019 CT.    TECHNIQUE: AP and lateral views radiograph of the chest was acquired. .    FINDINGS:    Lung/pleura: Ill-defined density along perihilar right lower lobe, not  definitively seen on prior study. No pleural effusion or pneumothorax.    Mediastinum: Cardiomediastinal silhouette is within normal limits.    MSK: No suggestion of acute osseous abnormality or aggressive osseous lesion.    Upper abdomen/soft tissue: Soft tissues are unremarkable.    Impression  Indeterminate nodular opacity along the right lower lobe which may  represent prominent pulmonary vessel versus mass lesion. Clinical correlation  and attention on follow-up advised.        Electronically signed by KRYSTAL SEN        Recent Labs     08/22/24  1545 08/23/24  0302   * 138   K 4.4 3.8    109*   CO2 26 27   BUN 22* 15   ALT 21  --      Recent Labs     08/22/24  1545 08/23/24  0302   WBC 8.6 8.0   HGB 14.5 12.2   HCT 43.8 37.7    222     Creatinine (MG/DL)   Date Value   08/23/2024 0.85   08/22/2024 1.02   08/15/2024 0.91   01/11/2024 0.95   06/29/2023 0.99       Current meds:    Current Facility-Administered Medications:     [START ON 8/24/2024] PARoxetine (PAXIL) tablet 5 mg, 5 mg, Oral, Sim RODRIGEZ Patrick B, APRN - NP    sodium chloride flush 0.9 % injection 5-40 mL, 5-40 mL, IntraVENous, 2 times per day, Nataliia Philip MD, 10 mL at 08/23/24 0831    sodium chloride flush 0.9 % injection 5-40 mL, 5-40 mL, IntraVENous, PRN, Nataliia Philip MD    0.9 % sodium chloride infusion, , IntraVENous, PRN, Nataliia Philip MD    potassium chloride (KLOR-CON) extended release tablet 40 mEq, 40 mEq, Oral, PRN  tablet 80 mg, 80 mg, Oral, Daily, Nataliia Philip MD, 80 mg at 08/23/24 0829    MARYANA Hill - CNP    [facility]  [place] center: (P) [phone]  (F) [phone]

## 2024-08-23 NOTE — ACP (ADVANCE CARE PLANNING)
Advance Care Planning     Advance Care Planning (ACP) Physician/NP/PA Conversation    Date of Conversation: 8/22/2024  Conducted with: Patient with Decision Making Capacity and Healthcare Decision Maker  Other persons present: Spouse      Healthcare Decision Maker:   Primary Decision Maker: Francisco Lockwood - Spouse - [phone]    Secondary Decision Maker: Dayan Lockwood - Child - [phone]         Care Preferences:    Hospitalization:  \"If your health worsens and it becomes clear that your chance of recovery is unlikely, what would be your preference regarding hospitalization?\"  The patient would prefer hospitalization.    Ventilation:  \"If you were unable to breath on your own and your chance of recovery was unlikely, what would be your preference about the use of a ventilator (breathing machine) if it was available to you?\"  The patient would desire the use of a ventilator.    Resuscitation:  \"In the event your heart stopped as a result of an underlying serious health condition, would you want attempts made to restart your heart, or would you prefer a natural death?\"  Yes, attempt to resuscitate.    ventilation preferences, hospitalization preferences, and resuscitation preferences    Conversation Outcomes / Follow-Up Plan:  ACP complete - no further action today  Reviewed DNR/DNI and patient elects Full Code (Attempt Resuscitation)    Patient would prefer to be full code however would not want long-term life support as reflected in her advanced directive which has been uploaded into the system    Length of Voluntary ACP Conversation in minutes:  16 minutes    MARYANA Prieto - YIMI

## 2024-08-27 RX ORDER — LANCETS 28 GAUGE
EACH MISCELLANEOUS
Qty: 100 EACH | Refills: 3 | Status: SHIPPED | OUTPATIENT
Start: 2024-08-27

## 2024-08-27 RX ORDER — CALCIUM CITRATE/VITAMIN D3 200MG-6.25
TABLET ORAL
Qty: 100 STRIP | Refills: 4 | Status: SHIPPED | OUTPATIENT
Start: 2024-08-27

## 2024-09-21 PROBLEM — R79.89 ELEVATED TROPONIN: Status: RESOLVED | Noted: 2024-08-22 | Resolved: 2024-09-21

## 2024-11-30 DIAGNOSIS — F41.9 ANXIETY: ICD-10-CM

## 2024-12-03 DIAGNOSIS — E78.5 HYPERLIPIDEMIA, UNSPECIFIED HYPERLIPIDEMIA TYPE: ICD-10-CM

## 2024-12-03 RX ORDER — ROSUVASTATIN CALCIUM 10 MG/1
10 TABLET, COATED ORAL NIGHTLY
Qty: 90 TABLET | Refills: 1 | Status: SHIPPED | OUTPATIENT
Start: 2024-12-03

## 2024-12-03 RX ORDER — METOPROLOL SUCCINATE 25 MG/1
25 TABLET, EXTENDED RELEASE ORAL DAILY
Qty: 90 TABLET | Refills: 1 | Status: SHIPPED | OUTPATIENT
Start: 2024-12-03

## 2024-12-03 RX ORDER — PAROXETINE 10 MG/1
TABLET, FILM COATED ORAL
Qty: 135 TABLET | Refills: 1 | Status: SHIPPED | OUTPATIENT
Start: 2024-12-03

## 2025-01-15 ENCOUNTER — OFFICE VISIT (OUTPATIENT)
Age: 80
End: 2025-01-15
Payer: MEDICARE

## 2025-01-15 VITALS
SYSTOLIC BLOOD PRESSURE: 146 MMHG | WEIGHT: 144 LBS | HEIGHT: 59 IN | DIASTOLIC BLOOD PRESSURE: 66 MMHG | BODY MASS INDEX: 29.03 KG/M2 | OXYGEN SATURATION: 97 % | HEART RATE: 57 BPM

## 2025-01-15 DIAGNOSIS — I10 UNCONTROLLED HYPERTENSION: ICD-10-CM

## 2025-01-15 PROCEDURE — 3078F DIAST BP <80 MM HG: CPT | Performed by: INTERNAL MEDICINE

## 2025-01-15 PROCEDURE — 1090F PRES/ABSN URINE INCON ASSESS: CPT | Performed by: INTERNAL MEDICINE

## 2025-01-15 PROCEDURE — 1123F ACP DISCUSS/DSCN MKR DOCD: CPT | Performed by: INTERNAL MEDICINE

## 2025-01-15 PROCEDURE — 3077F SYST BP >= 140 MM HG: CPT | Performed by: INTERNAL MEDICINE

## 2025-01-15 PROCEDURE — 1126F AMNT PAIN NOTED NONE PRSNT: CPT | Performed by: INTERNAL MEDICINE

## 2025-01-15 PROCEDURE — 1036F TOBACCO NON-USER: CPT | Performed by: INTERNAL MEDICINE

## 2025-01-15 PROCEDURE — 99214 OFFICE O/P EST MOD 30 MIN: CPT | Performed by: INTERNAL MEDICINE

## 2025-01-15 PROCEDURE — G8419 CALC BMI OUT NRM PARAM NOF/U: HCPCS | Performed by: INTERNAL MEDICINE

## 2025-01-15 PROCEDURE — G8427 DOCREV CUR MEDS BY ELIG CLIN: HCPCS | Performed by: INTERNAL MEDICINE

## 2025-01-15 PROCEDURE — 1159F MED LIST DOCD IN RCRD: CPT | Performed by: INTERNAL MEDICINE

## 2025-01-15 PROCEDURE — G8399 PT W/DXA RESULTS DOCUMENT: HCPCS | Performed by: INTERNAL MEDICINE

## 2025-01-15 RX ORDER — ASPIRIN 81 MG/1
81 TABLET ORAL
COMMUNITY

## 2025-01-15 RX ORDER — L.ACID/L.CASEI/B.BIF/B.LON/FOS 2B CELL-50
CAPSULE ORAL DAILY
COMMUNITY

## 2025-01-15 RX ORDER — AMLODIPINE BESYLATE 10 MG/1
10 TABLET ORAL DAILY
Qty: 90 TABLET | Refills: 3 | Status: SHIPPED | OUTPATIENT
Start: 2025-01-15

## 2025-01-15 ASSESSMENT — PATIENT HEALTH QUESTIONNAIRE - PHQ9
SUM OF ALL RESPONSES TO PHQ QUESTIONS 1-9: 0
2. FEELING DOWN, DEPRESSED OR HOPELESS: NOT AT ALL
SUM OF ALL RESPONSES TO PHQ9 QUESTIONS 1 & 2: 0
SUM OF ALL RESPONSES TO PHQ QUESTIONS 1-9: 0
1. LITTLE INTEREST OR PLEASURE IN DOING THINGS: NOT AT ALL
SUM OF ALL RESPONSES TO PHQ QUESTIONS 1-9: 0
SUM OF ALL RESPONSES TO PHQ QUESTIONS 1-9: 0

## 2025-01-15 NOTE — PROGRESS NOTES
Dr. Roman Macias [facility] cardiology [place].  [phone]               Cardiology Consult/Progress Note        Requesting/referring provider: Ruma Gibson DO       Reason for Consult: Establish care     ASSESSMENT  1.  Extended episode of tachycardia likely AVNRT with otherwise infrequent short bursts not impacting quality of life  2.  Probable underlying chronic coronary artery disease asymptomatic with low risk positive stress test in 2023 and abnormal troponins during AVNRT in 2024  3.  Hypertension  4.  Hyperlipidemia  5.  Type 2 diabetes mellitus        PLAN Patricia Lockwood presents to follow-up for history of AVNRT, suspected CAD, diabetes, hypertension.  Last year in August, she presented to the emergency room with palpitations and EKG in the emergency room was suggestive of narrow complex tachycardia consistent with AVNRT.  Discussed options of ablation with the patient.  At this point in time, given that the frequency of her episodes is quite low, she is not interested.  Continue metoprolol for now.    Will continue current medical therapy including beta-blockers and rest of her GDMT for hypertension hyperlipidemia and diabetes mellitus.  Echocardiogram was reviewed and does not demonstrate any evidence of LV dysfunction.  Troponins were marginally elevated during the episode of AVNRT and may suggest underlying chronic coronary artery disease..       History of diabetes mellitus, hypertension and presumed hyperlipidemia but not on statin.  Previously her stress test  demonstrated small to moderate sized perfusion defect in anterior wall/apex with overall low to intermediate risk stress test.  Over the past few months without palpitations, she does not note frequent angina.      I recommend initiation of statins with a diagnosis of presumed coronary artery disease.  We will start her on rosuvastatin 10 mg daily.     Blood pressure appears to be

## 2025-01-15 NOTE — PROGRESS NOTES
1. Have you been to the ER, urgent care clinic since your last visit?  Hospitalized since your last visit? Yes hospital stay 8/22/2024 for tachycardia     2. Have you seen or consulted any other health care providers outside of the [facility] since your last visit?  Include any pap smears or colon screening.  Yes Rheumatology Dr. Fox Garcia.

## 2025-01-21 DIAGNOSIS — I10 PRIMARY HYPERTENSION: ICD-10-CM

## 2025-01-21 RX ORDER — VALSARTAN 80 MG/1
80 TABLET ORAL DAILY
Qty: 90 TABLET | Refills: 1 | Status: SHIPPED | OUTPATIENT
Start: 2025-01-21

## 2025-02-10 ENCOUNTER — TELEPHONE (OUTPATIENT)
Dept: PRIMARY CARE CLINIC | Facility: CLINIC | Age: 80
End: 2025-02-10

## 2025-02-10 SDOH — HEALTH STABILITY: PHYSICAL HEALTH: ON AVERAGE, HOW MANY DAYS PER WEEK DO YOU ENGAGE IN MODERATE TO STRENUOUS EXERCISE (LIKE A BRISK WALK)?: 1 DAY

## 2025-02-10 SDOH — HEALTH STABILITY: PHYSICAL HEALTH: ON AVERAGE, HOW MANY MINUTES DO YOU ENGAGE IN EXERCISE AT THIS LEVEL?: 10 MIN

## 2025-02-10 ASSESSMENT — PATIENT HEALTH QUESTIONNAIRE - PHQ9
1. LITTLE INTEREST OR PLEASURE IN DOING THINGS: NOT AT ALL
2. FEELING DOWN, DEPRESSED OR HOPELESS: NOT AT ALL
SUM OF ALL RESPONSES TO PHQ QUESTIONS 1-9: 0

## 2025-02-18 DIAGNOSIS — I10 PRIMARY HYPERTENSION: ICD-10-CM

## 2025-02-18 RX ORDER — VALSARTAN 80 MG/1
80 TABLET ORAL DAILY
Qty: 30 TABLET | Refills: 0 | Status: SHIPPED | OUTPATIENT
Start: 2025-02-18

## 2025-02-18 NOTE — TELEPHONE ENCOUNTER
Called patient. Adsvised she should not be without her medication while she is out of town. Asked which pharmacy she would like medication sent to in [place]. She said: [facility] at  [address], [place] [address]     She will need about 15 tabs to get her through to get back home.

## 2025-02-18 NOTE — TELEPHONE ENCOUNTER
Patient is out of town in [place] for another week and she realized she forgot to take her valsartan with her.  Is she ok not taking it for another week or can she get a weeks worth of pills to take while she is gone?  Please call patient.

## 2025-03-13 ENCOUNTER — OFFICE VISIT (OUTPATIENT)
Age: 80
End: 2025-03-13
Payer: MEDICARE

## 2025-03-13 VITALS
SYSTOLIC BLOOD PRESSURE: 135 MMHG | HEIGHT: 59 IN | BODY MASS INDEX: 29.31 KG/M2 | RESPIRATION RATE: 19 BRPM | DIASTOLIC BLOOD PRESSURE: 65 MMHG | WEIGHT: 145.4 LBS | OXYGEN SATURATION: 96 % | HEART RATE: 50 BPM | TEMPERATURE: 97.8 F

## 2025-03-13 DIAGNOSIS — K64.1 GRADE II INTERNAL HEMORRHOIDS: ICD-10-CM

## 2025-03-13 DIAGNOSIS — D12.5 ADENOMATOUS POLYP OF SIGMOID COLON: Primary | ICD-10-CM

## 2025-03-13 PROCEDURE — 1126F AMNT PAIN NOTED NONE PRSNT: CPT | Performed by: SURGERY

## 2025-03-13 PROCEDURE — 1123F ACP DISCUSS/DSCN MKR DOCD: CPT | Performed by: SURGERY

## 2025-03-13 PROCEDURE — G8427 DOCREV CUR MEDS BY ELIG CLIN: HCPCS | Performed by: SURGERY

## 2025-03-13 PROCEDURE — 1036F TOBACCO NON-USER: CPT | Performed by: SURGERY

## 2025-03-13 PROCEDURE — 1090F PRES/ABSN URINE INCON ASSESS: CPT | Performed by: SURGERY

## 2025-03-13 PROCEDURE — 99213 OFFICE O/P EST LOW 20 MIN: CPT | Performed by: SURGERY

## 2025-03-13 PROCEDURE — 3075F SYST BP GE 130 - 139MM HG: CPT | Performed by: SURGERY

## 2025-03-13 PROCEDURE — G8419 CALC BMI OUT NRM PARAM NOF/U: HCPCS | Performed by: SURGERY

## 2025-03-13 PROCEDURE — 3078F DIAST BP <80 MM HG: CPT | Performed by: SURGERY

## 2025-03-13 PROCEDURE — 1159F MED LIST DOCD IN RCRD: CPT | Performed by: SURGERY

## 2025-03-13 PROCEDURE — G8399 PT W/DXA RESULTS DOCUMENT: HCPCS | Performed by: SURGERY

## 2025-03-13 RX ORDER — SODIUM, POTASSIUM,MAG SULFATES 17.5-3.13G
1 SOLUTION, RECONSTITUTED, ORAL ORAL ONCE
Qty: 1 EACH | Refills: 0 | Status: SHIPPED | OUTPATIENT
Start: 2025-03-13 | End: 2025-03-13

## 2025-03-13 ASSESSMENT — PATIENT HEALTH QUESTIONNAIRE - PHQ9
SUM OF ALL RESPONSES TO PHQ QUESTIONS 1-9: 0
2. FEELING DOWN, DEPRESSED OR HOPELESS: NOT AT ALL
SUM OF ALL RESPONSES TO PHQ QUESTIONS 1-9: 0
SUM OF ALL RESPONSES TO PHQ QUESTIONS 1-9: 0
1. LITTLE INTEREST OR PLEASURE IN DOING THINGS: NOT AT ALL
SUM OF ALL RESPONSES TO PHQ QUESTIONS 1-9: 0

## 2025-03-13 ASSESSMENT — ENCOUNTER SYMPTOMS
ABDOMINAL PAIN: 0
BLOOD IN STOOL: 0
SHORTNESS OF BREATH: 0
VOMITING: 0
CONSTIPATION: 0
NAUSEA: 0
DIARRHEA: 0
ABDOMINAL DISTENTION: 0

## 2025-03-13 NOTE — ASSESSMENT & PLAN NOTE
Aquaphor.  Limit toilet time to 2 minutes then get up.  Moist wipes.  Sitz baths, with epson salts.  Increase dietary fiber.  Increase daily exercise.    Will treat during colonoscopy with HET if still amenable.

## 2025-03-13 NOTE — PROGRESS NOTES
[facility]    Subjective    Patient ID: Patricia Lockwood is a 79 y.o. female.   Chief Complaint   Patient presents with    Follow-up     6 month follow up for adenomatous polyp of colon     HPI Comments: Patricia Lockwood presents today for follow up from large sessile serrated adenoma resection.  Needs repeat colonoscopy to ensure no residual.  Having more hemorrhoidal issues, mainly with pressure.  No bleeding but does protrude occasionally reduces on its own.     Allergies   Allergen Reactions    Sulfa Antibiotics Hives     Other reaction(s): Unknown (comments)    Penicillins Rash and Swelling     Other reaction(s): Unknown (comments)       Current Outpatient Medications:     sodium-potassium-mag sulfate (SUPREP) 17.5-3.13-1.6 GM/177ML SOLN solution, Take 1 kit by mouth once for 1 dose, Disp: 1 each, Rfl: 0    valsartan (DIOVAN) 80 MG tablet, Take 1 tablet by mouth daily, Disp: 30 tablet, Rfl: 0    aspirin 81 MG EC tablet, Take 1 tablet by mouth Monday, Wednesday, and Friday, Disp: , Rfl:     probiotic (ALIGN/RISAQUAD) CAPS capsule, Take by mouth daily, Disp: , Rfl:     amLODIPine (NORVASC) 10 MG tablet, Take 1 tablet by mouth daily, Disp: 90 tablet, Rfl: 3    PARoxetine (PAXIL) 10 MG tablet, TAKE 1 AND 1/2 TABLETS EVERY MORNING, Disp: 135 tablet, Rfl: 1    rosuvastatin (CRESTOR) 10 MG tablet, TAKE 1 TABLET EVERY NIGHT, Disp: 90 tablet, Rfl: 1    metoprolol succinate (TOPROL XL) 25 MG extended release tablet, TAKE 1 TABLET EVERY DAY, Disp: 90 tablet, Rfl: 1    PX Lancets Ultra Thin 28G MISC, USE ONCE DAILY, Disp: 100 each, Rfl: 3    blood glucose test strips (TRUE METRIX BLOOD GLUCOSE TEST) strip, TEST TWO TIMES A DAY., Disp: 100 strip, Rfl: 4    metFORMIN (GLUCOPHAGE) 500 MG tablet, Take 1 tablet by mouth 2 times daily (with meals), Disp: 180 tablet, Rfl: 2    Lactobacillus (PROBIOTIC ACIDOPHILUS PO), Take by mouth, Disp: , Rfl:     CALCIUM PO, Take 600 mg by mouth daily, Disp: , Rfl:

## 2025-03-14 ENCOUNTER — TELEPHONE (OUTPATIENT)
Age: 80
End: 2025-03-14

## 2025-03-14 NOTE — TELEPHONE ENCOUNTER
Contacted patient to schedule colonoscopy with Dr. Katz. Patient stated that she was not at home currently and will call back when she is in front of her calendar. Patient thanked me for the call.

## 2025-03-17 ENCOUNTER — PREP FOR PROCEDURE (OUTPATIENT)
Age: 80
End: 2025-03-17

## 2025-03-17 DIAGNOSIS — K64.1 SECOND DEGREE HEMORRHOIDS: ICD-10-CM

## 2025-03-17 DIAGNOSIS — D12.5 ADENOMATOUS POLYP OF SIGMOID COLON: ICD-10-CM

## 2025-03-28 SDOH — HEALTH STABILITY: PHYSICAL HEALTH: ON AVERAGE, HOW MANY DAYS PER WEEK DO YOU ENGAGE IN MODERATE TO STRENUOUS EXERCISE (LIKE A BRISK WALK)?: 1 DAY

## 2025-03-28 SDOH — HEALTH STABILITY: PHYSICAL HEALTH: ON AVERAGE, HOW MANY MINUTES DO YOU ENGAGE IN EXERCISE AT THIS LEVEL?: 10 MIN

## 2025-03-28 ASSESSMENT — PATIENT HEALTH QUESTIONNAIRE - PHQ9
SUM OF ALL RESPONSES TO PHQ QUESTIONS 1-9: 0
SUM OF ALL RESPONSES TO PHQ QUESTIONS 1-9: 0
2. FEELING DOWN, DEPRESSED OR HOPELESS: NOT AT ALL
1. LITTLE INTEREST OR PLEASURE IN DOING THINGS: NOT AT ALL
SUM OF ALL RESPONSES TO PHQ QUESTIONS 1-9: 0
SUM OF ALL RESPONSES TO PHQ QUESTIONS 1-9: 0

## 2025-03-28 ASSESSMENT — LIFESTYLE VARIABLES
HOW MANY STANDARD DRINKS CONTAINING ALCOHOL DO YOU HAVE ON A TYPICAL DAY: 0
HOW OFTEN DO YOU HAVE SIX OR MORE DRINKS ON ONE OCCASION: 1
HOW MANY STANDARD DRINKS CONTAINING ALCOHOL DO YOU HAVE ON A TYPICAL DAY: PATIENT DOES NOT DRINK
HOW OFTEN DO YOU HAVE A DRINK CONTAINING ALCOHOL: NEVER
HOW OFTEN DO YOU HAVE A DRINK CONTAINING ALCOHOL: 1

## 2025-04-01 ENCOUNTER — OFFICE VISIT (OUTPATIENT)
Dept: PRIMARY CARE CLINIC | Facility: CLINIC | Age: 80
End: 2025-04-01

## 2025-04-01 VITALS
TEMPERATURE: 97.8 F | RESPIRATION RATE: 18 BRPM | OXYGEN SATURATION: 97 % | WEIGHT: 148.4 LBS | HEART RATE: 55 BPM | HEIGHT: 59 IN | BODY MASS INDEX: 29.92 KG/M2 | SYSTOLIC BLOOD PRESSURE: 145 MMHG | DIASTOLIC BLOOD PRESSURE: 55 MMHG

## 2025-04-01 DIAGNOSIS — Z91.89 FRACTURE RISK ASSESSMENT SCORE (FRAX) INDICATING GREATER THAN 3% RISK FOR HIP FRACTURE: ICD-10-CM

## 2025-04-01 DIAGNOSIS — R19.8 ALTERNATING CONSTIPATION AND DIARRHEA: ICD-10-CM

## 2025-04-01 DIAGNOSIS — M06.9 RHEUMATOID ARTHRITIS, INVOLVING UNSPECIFIED SITE, UNSPECIFIED WHETHER RHEUMATOID FACTOR PRESENT (HCC): ICD-10-CM

## 2025-04-01 DIAGNOSIS — Z23 ENCOUNTER FOR IMMUNIZATION: ICD-10-CM

## 2025-04-01 DIAGNOSIS — E11.65 UNCONTROLLED TYPE 2 DIABETES MELLITUS WITH HYPERGLYCEMIA (HCC): ICD-10-CM

## 2025-04-01 DIAGNOSIS — Z00.00 MEDICARE ANNUAL WELLNESS VISIT, SUBSEQUENT: Primary | ICD-10-CM

## 2025-04-01 DIAGNOSIS — I10 UNCONTROLLED HYPERTENSION: ICD-10-CM

## 2025-04-01 DIAGNOSIS — M85.80 OSTEOPENIA, UNSPECIFIED LOCATION: ICD-10-CM

## 2025-04-01 DIAGNOSIS — Z91.89 FRACTURE RISK ASSESSMENT SCORE (FRAX) INDICATING GREATER THAN 20% RISK FOR MAJOR OSTEOPOROSIS-RELATED FRACTURE: ICD-10-CM

## 2025-04-01 DIAGNOSIS — G89.29 CHRONIC LEFT-SIDED LOW BACK PAIN WITH LEFT-SIDED SCIATICA: ICD-10-CM

## 2025-04-01 DIAGNOSIS — E78.5 HYPERLIPIDEMIA, UNSPECIFIED HYPERLIPIDEMIA TYPE: ICD-10-CM

## 2025-04-01 DIAGNOSIS — M54.42 CHRONIC LEFT-SIDED LOW BACK PAIN WITH LEFT-SIDED SCIATICA: ICD-10-CM

## 2025-04-01 RX ORDER — LANCETS 28 GAUGE
EACH MISCELLANEOUS
Qty: 200 EACH | Refills: 3 | Status: SHIPPED | OUTPATIENT
Start: 2025-04-01

## 2025-04-01 SDOH — ECONOMIC STABILITY: FOOD INSECURITY: WITHIN THE PAST 12 MONTHS, THE FOOD YOU BOUGHT JUST DIDN'T LAST AND YOU DIDN'T HAVE MONEY TO GET MORE.: NEVER TRUE

## 2025-04-01 SDOH — ECONOMIC STABILITY: FOOD INSECURITY: WITHIN THE PAST 12 MONTHS, YOU WORRIED THAT YOUR FOOD WOULD RUN OUT BEFORE YOU GOT MONEY TO BUY MORE.: NEVER TRUE

## 2025-04-01 NOTE — PROGRESS NOTES
Health Decision Maker has been checked with the patient   Primary Decision Maker: Francisco Lockwood - Spouse - [phone]    Secondary Decision Maker: Dayan Lockwood - Child - [phone]     AI form was signed    Chief Complaint   Patient presents with    Medicare AWV       \"Have you been to the ER, urgent care clinic since your last visit?  Hospitalized since your last visit?\"    NO    “Have you seen or consulted any other health care providers outside of [facility] since your last visit?”    NO      There were no vitals filed for this visit.   Depression: Not at risk (3/28/2025)    PHQ-2     PHQ-2 Score: 0              Click Here for Release of Records Request    Chart reviewed: immunizations are documented.   Immunization History   Administered Date(s) Administered    COVID-19, PFIZER PURPLE top, DILUTE for use, (age 12 y+), 30mcg/0.3mL 03/25/2021, 04/21/2021    Influenza, FLUAD, (age 65 y+), IM, Quadv, 0.5mL 08/31/2020    Influenza, FLUAD, (age 65 y+), IM, Trivalent PF, 0.5mL 09/05/2018, 09/05/2019    Influenza, FLUZONE High Dose, (age 65 y+), IM, Trivalent PF, 0.5mL 09/01/2021    Pneumococcal Vaccine 02/07/2011    Pneumococcal, PCV-13, PREVNAR 13, (age 6w+), IM, 0.5mL 08/12/2015    TDaP, ADACEL (age 10y-64y), BOOSTRIX (age 10y+), IM, 0.5mL 07/14/2014    Zoster Live (Zostavax) 01/01/2012    Zoster Recombinant (Shingrix) 07/22/2020, 10/23/2020

## 2025-04-03 DIAGNOSIS — E11.65 UNCONTROLLED TYPE 2 DIABETES MELLITUS WITH HYPERGLYCEMIA (HCC): ICD-10-CM

## 2025-04-03 DIAGNOSIS — I10 UNCONTROLLED HYPERTENSION: ICD-10-CM

## 2025-04-03 DIAGNOSIS — E78.5 HYPERLIPIDEMIA, UNSPECIFIED HYPERLIPIDEMIA TYPE: ICD-10-CM

## 2025-04-03 LAB
ALBUMIN SERPL-MCNC: 3.7 G/DL (ref 3.5–5)
ALBUMIN/GLOB SERPL: 1.4 (ref 1.1–2.2)
ALP SERPL-CCNC: 48 U/L (ref 45–117)
ALT SERPL-CCNC: 23 U/L (ref 12–78)
ANION GAP SERPL CALC-SCNC: 3 MMOL/L (ref 2–12)
AST SERPL-CCNC: 13 U/L (ref 15–37)
BILIRUB SERPL-MCNC: 0.5 MG/DL (ref 0.2–1)
BUN SERPL-MCNC: 24 MG/DL (ref 6–20)
BUN/CREAT SERPL: 28 (ref 12–20)
CALCIUM SERPL-MCNC: 9.4 MG/DL (ref 8.5–10.1)
CHLORIDE SERPL-SCNC: 103 MMOL/L (ref 97–108)
CHOLEST SERPL-MCNC: 176 MG/DL
CO2 SERPL-SCNC: 31 MMOL/L (ref 21–32)
CREAT SERPL-MCNC: 0.85 MG/DL (ref 0.55–1.02)
CREAT UR-MCNC: 40.1 MG/DL
ERYTHROCYTE [DISTWIDTH] IN BLOOD BY AUTOMATED COUNT: 12.8 % (ref 11.5–14.5)
EST. AVERAGE GLUCOSE BLD GHB EST-MCNC: 177 MG/DL
GLOBULIN SER CALC-MCNC: 2.7 G/DL (ref 2–4)
GLUCOSE SERPL-MCNC: 133 MG/DL (ref 65–100)
HBA1C MFR BLD: 7.8 % (ref 4–5.6)
HCT VFR BLD AUTO: 41.2 % (ref 35–47)
HDLC SERPL-MCNC: 120 MG/DL
HDLC SERPL: 1.5 (ref 0–5)
HGB BLD-MCNC: 13.1 G/DL (ref 11.5–16)
LDLC SERPL CALC-MCNC: 39.6 MG/DL (ref 0–100)
MCH RBC QN AUTO: 32.3 PG (ref 26–34)
MCHC RBC AUTO-ENTMCNC: 31.8 G/DL (ref 30–36.5)
MCV RBC AUTO: 101.7 FL (ref 80–99)
MICROALBUMIN UR-MCNC: <0.5 MG/DL
MICROALBUMIN/CREAT UR-RTO: <12 MG/G (ref 0–30)
NRBC # BLD: 0 K/UL (ref 0–0.01)
NRBC BLD-RTO: 0 PER 100 WBC
PLATELET # BLD AUTO: 271 K/UL (ref 150–400)
PMV BLD AUTO: 10 FL (ref 8.9–12.9)
POTASSIUM SERPL-SCNC: 4.1 MMOL/L (ref 3.5–5.1)
PROT SERPL-MCNC: 6.4 G/DL (ref 6.4–8.2)
RBC # BLD AUTO: 4.05 M/UL (ref 3.8–5.2)
SODIUM SERPL-SCNC: 137 MMOL/L (ref 136–145)
TRIGL SERPL-MCNC: 82 MG/DL
VLDLC SERPL CALC-MCNC: 16.4 MG/DL
WBC # BLD AUTO: 6.1 K/UL (ref 3.6–11)

## 2025-04-04 ENCOUNTER — RESULTS FOLLOW-UP (OUTPATIENT)
Dept: PRIMARY CARE CLINIC | Facility: CLINIC | Age: 80
End: 2025-04-04

## 2025-04-30 DIAGNOSIS — F41.9 ANXIETY: ICD-10-CM

## 2025-05-01 RX ORDER — PAROXETINE 10 MG/1
TABLET, FILM COATED ORAL
Qty: 135 TABLET | Refills: 1 | Status: SHIPPED | OUTPATIENT
Start: 2025-05-01

## 2025-05-07 ENCOUNTER — OFFICE VISIT (OUTPATIENT)
Dept: PRIMARY CARE CLINIC | Facility: CLINIC | Age: 80
End: 2025-05-07
Payer: MEDICARE

## 2025-05-07 ENCOUNTER — TELEPHONE (OUTPATIENT)
Age: 80
End: 2025-05-07

## 2025-05-07 VITALS
SYSTOLIC BLOOD PRESSURE: 121 MMHG | RESPIRATION RATE: 18 BRPM | TEMPERATURE: 97.8 F | OXYGEN SATURATION: 99 % | HEIGHT: 59 IN | BODY MASS INDEX: 29.68 KG/M2 | WEIGHT: 147.2 LBS | HEART RATE: 48 BPM | DIASTOLIC BLOOD PRESSURE: 68 MMHG

## 2025-05-07 DIAGNOSIS — E11.65 UNCONTROLLED TYPE 2 DIABETES MELLITUS WITH HYPERGLYCEMIA (HCC): Primary | ICD-10-CM

## 2025-05-07 DIAGNOSIS — R00.1 BRADYCARDIA: ICD-10-CM

## 2025-05-07 DIAGNOSIS — D75.89 MACROCYTOSIS: ICD-10-CM

## 2025-05-07 PROCEDURE — 1123F ACP DISCUSS/DSCN MKR DOCD: CPT | Performed by: FAMILY MEDICINE

## 2025-05-07 PROCEDURE — 1090F PRES/ABSN URINE INCON ASSESS: CPT | Performed by: FAMILY MEDICINE

## 2025-05-07 PROCEDURE — 99214 OFFICE O/P EST MOD 30 MIN: CPT | Performed by: FAMILY MEDICINE

## 2025-05-07 PROCEDURE — 3078F DIAST BP <80 MM HG: CPT | Performed by: FAMILY MEDICINE

## 2025-05-07 PROCEDURE — G8399 PT W/DXA RESULTS DOCUMENT: HCPCS | Performed by: FAMILY MEDICINE

## 2025-05-07 PROCEDURE — 1126F AMNT PAIN NOTED NONE PRSNT: CPT | Performed by: FAMILY MEDICINE

## 2025-05-07 PROCEDURE — 3074F SYST BP LT 130 MM HG: CPT | Performed by: FAMILY MEDICINE

## 2025-05-07 PROCEDURE — G8427 DOCREV CUR MEDS BY ELIG CLIN: HCPCS | Performed by: FAMILY MEDICINE

## 2025-05-07 PROCEDURE — G8419 CALC BMI OUT NRM PARAM NOF/U: HCPCS | Performed by: FAMILY MEDICINE

## 2025-05-07 PROCEDURE — 3051F HG A1C>EQUAL 7.0%<8.0%: CPT | Performed by: FAMILY MEDICINE

## 2025-05-07 PROCEDURE — 1036F TOBACCO NON-USER: CPT | Performed by: FAMILY MEDICINE

## 2025-05-07 NOTE — PROGRESS NOTES
HPI     Chief Complaint   Patient presents with    Follow-up       History of Present Illness  The patient is an 79-year-old female here for follow-up.    Primary reason: review lab results and medication regimen. Blood counts normal, RBC size slightly large, possibly indicating B12 or folate deficiency? Metformin use increases B12 deficiency risk. Kidney function good, slight dehydration noted. Liver function normal, glucose elevated due to diabetes. Cholesterol excellent, no excessive protein in urine. A1c higher than desired.    No lethargy or fatigue, suboptimal sleep quality noted. History of tachycardia, no current issues. Heart rate slightly low at 47, likely due to metoprolol. Monitors heart rate at home with pulse oximeter.    On metformin for diabetes.     Reviewed PmHx, RxHx, FmHx, SocHx, AllgHx and updated and dated in the chart.    Physical Exam:  /68   Pulse (!) 48   Temp 97.8 °F (36.6 °C) (Oral)   Resp 18   Ht 1.499 m (4' 11\")   Wt 66.8 kg (147 lb 3.2 oz)   SpO2 99%   BMI 29.73 kg/m²     Physical Exam  WNWD NAD  Regular rhythm, alexis  CTA no wheezes ronchi rales  No focal deficits       Results  - Labs:    - CBC: RBC size slightly large    - Kidney Function: slight dehydration    - Liver Function: Normal    - Blood Glucose: Elevated    - Lipid Panel: Normal cholesterol    - Hemoglobin A1c: Slightly high        No results found for this or any previous visit (from the past 12 hours).}        Assessment / Plan       ICD-10-CM    1. Uncontrolled type 2 diabetes mellitus with hyperglycemia (HCC)  E11.65 metFORMIN (GLUCOPHAGE) 500 MG tablet      2. Macrocytosis  D75.89 Vitamin B12 & Folate           Assessment & Plan  1. Macrocytosis  - RBC size slightly enlarged, possibly due to B12 or folate deficiency  - On metformin, which increases B12 deficiency risk  - Blood test for B12 and folate levels today  - Replenish if low; otherwise, monitor RBC size    2. Diabetes mellitus  - Elevated

## 2025-05-07 NOTE — TELEPHONE ENCOUNTER
Contacted patient to reschedule appointment on 5/23 due to Cross being out of the office. Patient asks for 7/25. Notified of arrival time and stated I will send an updated letter via [employer] and home address.

## 2025-05-07 NOTE — PROGRESS NOTES
Health Decision Maker has been checked with the patient   Primary Decision Maker: Francisco Lockwood - Spouse - [phone]    Secondary Decision Maker: Dayan Lockwood - Child - [phone]     AI form was signed    \"Have you been to the ER, urgent care clinic since your last visit?  Hospitalized since your last visit?\"    NO    “Have you seen or consulted any other health care providers outside of [facility] since your last visit?”    NO      Vitals:    05/07/25 1249   Resp: 18   Temp: 97.8 °F (36.6 °C)   TempSrc: Oral   SpO2: 99%   Weight: 66.8 kg (147 lb 3.2 oz)   Height: 1.499 m (4' 11\")      Depression: Not at risk (3/28/2025)    PHQ-2     PHQ-2 Score: 0              Click Here for Release of Records Request    Chart reviewed: immunizations are documented.   Immunization History   Administered Date(s) Administered    COVID-19, PFIZER PURPLE top, DILUTE for use, (age 12 y+), 30mcg/0.3mL 03/25/2021, 04/21/2021    Influenza, FLUAD, (age 65 y+), IM, Quadv, 0.5mL 08/31/2020    Influenza, FLUAD, (age 65 y+), IM, Trivalent PF, 0.5mL 09/05/2018, 09/05/2019    Influenza, FLUZONE High Dose, (age 65 y+), IM, Trivalent PF, 0.5mL 09/01/2021    Pneumococcal Vaccine 02/07/2011    Pneumococcal, PCV-13, PREVNAR 13, (age 6w+), IM, 0.5mL 08/12/2015    Pneumococcal, PCV20, PREVNAR 20, (age 6w+), IM, 0.5mL 04/01/2025    TDaP, ADACEL (age 10y-64y), BOOSTRIX (age 10y+), IM, 0.5mL 07/14/2014    Zoster Live (Zostavax) 01/01/2012    Zoster Recombinant (Shingrix) 07/22/2020, 10/23/2020

## 2025-05-08 ENCOUNTER — RESULTS FOLLOW-UP (OUTPATIENT)
Dept: PRIMARY CARE CLINIC | Facility: CLINIC | Age: 80
End: 2025-05-08

## 2025-05-08 DIAGNOSIS — D75.89 MACROCYTOSIS: Primary | ICD-10-CM

## 2025-05-08 LAB
FOLATE SERPL-MCNC: ABNORMAL NG/ML (ref 5–21)
VIT B12 SERPL-MCNC: 1118 PG/ML (ref 193–986)

## 2025-05-09 DIAGNOSIS — I10 PRIMARY HYPERTENSION: ICD-10-CM

## 2025-05-12 DIAGNOSIS — D75.89 MACROCYTOSIS: ICD-10-CM

## 2025-05-12 LAB — FOLATE SERPL-MCNC: 25.4 NG/ML (ref 5–21)

## 2025-05-13 ENCOUNTER — RESULTS FOLLOW-UP (OUTPATIENT)
Dept: PRIMARY CARE CLINIC | Facility: CLINIC | Age: 80
End: 2025-05-13

## 2025-05-13 RX ORDER — VALSARTAN 80 MG/1
80 TABLET ORAL DAILY
Qty: 90 TABLET | Refills: 3 | Status: SHIPPED | OUTPATIENT
Start: 2025-05-13

## 2025-05-21 ENCOUNTER — TELEPHONE (OUTPATIENT)
Age: 80
End: 2025-05-21

## 2025-05-21 NOTE — TELEPHONE ENCOUNTER
Patient called in stating that she does not want to have the colonoscopy anymore and requests to cancel with no reschedule. Canceled procedure as requested.

## 2025-05-22 DIAGNOSIS — E78.5 HYPERLIPIDEMIA, UNSPECIFIED HYPERLIPIDEMIA TYPE: ICD-10-CM

## 2025-05-26 RX ORDER — ROSUVASTATIN CALCIUM 10 MG/1
10 TABLET, COATED ORAL NIGHTLY
Qty: 90 TABLET | Refills: 3 | Status: SHIPPED | OUTPATIENT
Start: 2025-05-26

## 2025-05-26 RX ORDER — METOPROLOL SUCCINATE 25 MG/1
25 TABLET, EXTENDED RELEASE ORAL DAILY
Qty: 90 TABLET | Refills: 3 | Status: SHIPPED | OUTPATIENT
Start: 2025-05-26

## 2025-07-07 DIAGNOSIS — E11.65 UNCONTROLLED TYPE 2 DIABETES MELLITUS WITH HYPERGLYCEMIA (HCC): ICD-10-CM

## 2025-07-08 RX ORDER — LANCETS 28 GAUGE
EACH MISCELLANEOUS
Qty: 200 EACH | Refills: 3 | Status: SHIPPED | OUTPATIENT
Start: 2025-07-08

## 2025-07-10 DIAGNOSIS — E11.65 UNCONTROLLED TYPE 2 DIABETES MELLITUS WITH HYPERGLYCEMIA (HCC): ICD-10-CM

## 2025-07-15 RX ORDER — GLUCOSAM/CHON-MSM1/C/MANG/BOSW 500-416.6
TABLET ORAL
Qty: 100 EACH | Refills: 2 | Status: SHIPPED | OUTPATIENT
Start: 2025-07-15

## 2025-07-15 RX ORDER — DIPHENHYDRAMINE HCL 25 MG
TABLET ORAL
Qty: 1 KIT | Refills: 0 | Status: SHIPPED | OUTPATIENT
Start: 2025-07-15

## (undated) DEVICE — ELECTRODE PT RET AD L9FT HI MOIST COND ADH HYDRGEL CORDED

## (undated) DEVICE — TRAP SURG QUAD PARABOLA SLOT DSGN SFTY SCRN TRAPEASE

## (undated) DEVICE — SNARE ENDOSCP POLYP MED STD AD 2.4X27X240 CM 2.8 MM OVL SENS